# Patient Record
Sex: FEMALE | Race: WHITE | NOT HISPANIC OR LATINO | Employment: OTHER | URBAN - METROPOLITAN AREA
[De-identification: names, ages, dates, MRNs, and addresses within clinical notes are randomized per-mention and may not be internally consistent; named-entity substitution may affect disease eponyms.]

---

## 2017-02-11 ENCOUNTER — HOSPITAL ENCOUNTER (OUTPATIENT)
Dept: MAMMOGRAPHY | Facility: HOSPITAL | Age: 77
Discharge: HOME | End: 2017-02-11

## 2017-02-11 DIAGNOSIS — Z12.31 VISIT FOR SCREENING MAMMOGRAM: ICD-10-CM

## 2018-02-06 ENCOUNTER — RECORDS - HEALTHEAST (OUTPATIENT)
Dept: LAB | Facility: CLINIC | Age: 78
End: 2018-02-06

## 2018-02-07 LAB
ALBUMIN SERPL-MCNC: 3.7 G/DL (ref 3.5–5)
ALP SERPL-CCNC: 99 U/L (ref 45–120)
ALT SERPL W P-5'-P-CCNC: 10 U/L (ref 0–45)
ANION GAP SERPL CALCULATED.3IONS-SCNC: 11 MMOL/L (ref 5–18)
AST SERPL W P-5'-P-CCNC: 12 U/L (ref 0–40)
BASOPHILS # BLD AUTO: 0 THOU/UL (ref 0–0.2)
BASOPHILS NFR BLD AUTO: 1 % (ref 0–2)
BILIRUB SERPL-MCNC: 0.4 MG/DL (ref 0–1)
BUN SERPL-MCNC: 30 MG/DL (ref 8–28)
CALCIUM SERPL-MCNC: 9.1 MG/DL (ref 8.5–10.5)
CHLORIDE BLD-SCNC: 106 MMOL/L (ref 98–107)
CHOLEST SERPL-MCNC: 197 MG/DL
CO2 SERPL-SCNC: 26 MMOL/L (ref 22–31)
CREAT SERPL-MCNC: 1.17 MG/DL (ref 0.6–1.1)
EOSINOPHIL # BLD AUTO: 0.1 THOU/UL (ref 0–0.4)
EOSINOPHIL NFR BLD AUTO: 2 % (ref 0–6)
ERYTHROCYTE [DISTWIDTH] IN BLOOD BY AUTOMATED COUNT: 13.2 % (ref 11–14.5)
FASTING STATUS PATIENT QL REPORTED: ABNORMAL
GFR SERPL CREATININE-BSD FRML MDRD: 45 ML/MIN/1.73M2
GLUCOSE BLD-MCNC: 101 MG/DL (ref 70–125)
HCT VFR BLD AUTO: 43.5 % (ref 35–47)
HDLC SERPL-MCNC: 56 MG/DL
HGB BLD-MCNC: 14.3 G/DL (ref 12–16)
LDLC SERPL CALC-MCNC: 103 MG/DL
LYMPHOCYTES # BLD AUTO: 1.9 THOU/UL (ref 0.8–4.4)
LYMPHOCYTES NFR BLD AUTO: 34 % (ref 20–40)
MCH RBC QN AUTO: 30.4 PG (ref 27–34)
MCHC RBC AUTO-ENTMCNC: 32.9 G/DL (ref 32–36)
MCV RBC AUTO: 92 FL (ref 80–100)
MONOCYTES # BLD AUTO: 0.6 THOU/UL (ref 0–0.9)
MONOCYTES NFR BLD AUTO: 11 % (ref 2–10)
NEUTROPHILS # BLD AUTO: 2.9 THOU/UL (ref 2–7.7)
NEUTROPHILS NFR BLD AUTO: 53 % (ref 50–70)
PLATELET # BLD AUTO: 211 THOU/UL (ref 140–440)
PMV BLD AUTO: 11.1 FL (ref 8.5–12.5)
POTASSIUM BLD-SCNC: 3.7 MMOL/L (ref 3.5–5)
PROT SERPL-MCNC: 7.1 G/DL (ref 6–8)
RBC # BLD AUTO: 4.71 MILL/UL (ref 3.8–5.4)
SODIUM SERPL-SCNC: 143 MMOL/L (ref 136–145)
TRIGL SERPL-MCNC: 191 MG/DL
TSH SERPL DL<=0.005 MIU/L-ACNC: 2.51 UIU/ML (ref 0.3–5)
WBC: 5.5 THOU/UL (ref 4–11)

## 2018-02-21 ENCOUNTER — HOSPITAL ENCOUNTER (OUTPATIENT)
Dept: MAMMOGRAPHY | Facility: HOSPITAL | Age: 78
Discharge: HOME | End: 2018-02-21

## 2018-02-21 DIAGNOSIS — Z12.31 VISIT FOR SCREENING MAMMOGRAM: ICD-10-CM

## 2019-02-22 ENCOUNTER — RECORDS - HEALTHEAST (OUTPATIENT)
Dept: ADMINISTRATIVE | Facility: OTHER | Age: 79
End: 2019-02-22

## 2019-02-22 ENCOUNTER — RECORDS - HEALTHEAST (OUTPATIENT)
Dept: LAB | Facility: CLINIC | Age: 79
End: 2019-02-22

## 2019-02-22 LAB
ALBUMIN SERPL-MCNC: 3.9 G/DL (ref 3.5–5)
ALP SERPL-CCNC: 95 U/L (ref 45–120)
ALT SERPL W P-5'-P-CCNC: 25 U/L (ref 0–45)
ANION GAP SERPL CALCULATED.3IONS-SCNC: 10 MMOL/L (ref 5–18)
AST SERPL W P-5'-P-CCNC: 14 U/L (ref 0–40)
BILIRUB SERPL-MCNC: 0.4 MG/DL (ref 0–1)
BUN SERPL-MCNC: 22 MG/DL (ref 8–28)
CALCIUM SERPL-MCNC: 9 MG/DL (ref 8.5–10.5)
CHLORIDE BLD-SCNC: 105 MMOL/L (ref 98–107)
CHOLEST SERPL-MCNC: 200 MG/DL
CO2 SERPL-SCNC: 27 MMOL/L (ref 22–31)
CREAT SERPL-MCNC: 1.12 MG/DL (ref 0.6–1.1)
ERYTHROCYTE [DISTWIDTH] IN BLOOD BY AUTOMATED COUNT: 13.2 % (ref 11–14.5)
FASTING STATUS PATIENT QL REPORTED: ABNORMAL
GFR SERPL CREATININE-BSD FRML MDRD: 47 ML/MIN/1.73M2
GLUCOSE BLD-MCNC: 92 MG/DL (ref 70–125)
HCT VFR BLD AUTO: 44.9 % (ref 35–47)
HDLC SERPL-MCNC: 60 MG/DL
HGB BLD-MCNC: 14.5 G/DL (ref 12–16)
LDLC SERPL CALC-MCNC: 95 MG/DL
MCH RBC QN AUTO: 29.5 PG (ref 27–34)
MCHC RBC AUTO-ENTMCNC: 32.3 G/DL (ref 32–36)
MCV RBC AUTO: 91 FL (ref 80–100)
PLATELET # BLD AUTO: 252 THOU/UL (ref 140–440)
PMV BLD AUTO: 10.5 FL (ref 8.5–12.5)
POTASSIUM BLD-SCNC: 3.7 MMOL/L (ref 3.5–5)
PROT SERPL-MCNC: 7.2 G/DL (ref 6–8)
RBC # BLD AUTO: 4.91 MILL/UL (ref 3.8–5.4)
SODIUM SERPL-SCNC: 142 MMOL/L (ref 136–145)
TRIGL SERPL-MCNC: 225 MG/DL
WBC: 6.3 THOU/UL (ref 4–11)

## 2019-04-27 ENCOUNTER — HOSPITAL ENCOUNTER (OUTPATIENT)
Dept: MAMMOGRAPHY | Facility: CLINIC | Age: 79
Discharge: HOME | End: 2019-04-27

## 2019-04-27 DIAGNOSIS — Z12.31 VISIT FOR SCREENING MAMMOGRAM: ICD-10-CM

## 2019-06-03 ENCOUNTER — TRANSFERRED RECORDS (OUTPATIENT)
Dept: HEALTH INFORMATION MANAGEMENT | Facility: CLINIC | Age: 79
End: 2019-06-03

## 2019-06-03 ENCOUNTER — SURGERY - HEALTHEAST (OUTPATIENT)
Dept: SURGERY | Facility: CLINIC | Age: 79
End: 2019-06-03

## 2019-06-03 ENCOUNTER — ANESTHESIA - HEALTHEAST (OUTPATIENT)
Dept: SURGERY | Facility: CLINIC | Age: 79
End: 2019-06-03

## 2019-06-03 ASSESSMENT — MIFFLIN-ST. JEOR: SCORE: 1099.55

## 2019-06-04 ENCOUNTER — TRANSFERRED RECORDS (OUTPATIENT)
Dept: HEALTH INFORMATION MANAGEMENT | Facility: CLINIC | Age: 79
End: 2019-06-04

## 2019-06-04 ASSESSMENT — MIFFLIN-ST. JEOR: SCORE: 1103.18

## 2019-06-06 ENCOUNTER — HOME CARE/HOSPICE - HEALTHEAST (OUTPATIENT)
Dept: HOME HEALTH SERVICES | Facility: HOME HEALTH | Age: 79
End: 2019-06-06

## 2019-06-07 ENCOUNTER — COMMUNICATION - HEALTHEAST (OUTPATIENT)
Dept: NEUROSURGERY | Facility: CLINIC | Age: 79
End: 2019-06-07

## 2019-06-07 ENCOUNTER — COMMUNICATION - HEALTHEAST (OUTPATIENT)
Dept: PULMONOLOGY | Facility: OTHER | Age: 79
End: 2019-06-07

## 2019-06-07 DIAGNOSIS — I67.1 NONRUPTURED CEREBRAL ANEURYSM: ICD-10-CM

## 2019-06-09 ENCOUNTER — HOME CARE/HOSPICE - HEALTHEAST (OUTPATIENT)
Dept: HOME HEALTH SERVICES | Facility: HOME HEALTH | Age: 79
End: 2019-06-09

## 2019-06-10 ENCOUNTER — HOME CARE/HOSPICE - HEALTHEAST (OUTPATIENT)
Dept: HOME HEALTH SERVICES | Facility: HOME HEALTH | Age: 79
End: 2019-06-10

## 2019-06-11 ENCOUNTER — COMMUNICATION - HEALTHEAST (OUTPATIENT)
Dept: HOME HEALTH SERVICES | Facility: HOME HEALTH | Age: 79
End: 2019-06-11

## 2019-06-11 ENCOUNTER — HOME CARE/HOSPICE - HEALTHEAST (OUTPATIENT)
Dept: HOME HEALTH SERVICES | Facility: HOME HEALTH | Age: 79
End: 2019-06-11

## 2019-06-12 ENCOUNTER — HOME CARE/HOSPICE - HEALTHEAST (OUTPATIENT)
Dept: HOME HEALTH SERVICES | Facility: HOME HEALTH | Age: 79
End: 2019-06-12

## 2019-06-13 ENCOUNTER — OFFICE VISIT - HEALTHEAST (OUTPATIENT)
Dept: PULMONOLOGY | Facility: OTHER | Age: 79
End: 2019-06-13

## 2019-06-13 DIAGNOSIS — R91.8 LUNG MASS: ICD-10-CM

## 2019-06-13 DIAGNOSIS — R91.8 LUNG NODULE, MULTIPLE: ICD-10-CM

## 2019-06-13 ASSESSMENT — MIFFLIN-ST. JEOR: SCORE: 1050.1

## 2019-06-14 ENCOUNTER — HOME CARE/HOSPICE - HEALTHEAST (OUTPATIENT)
Dept: HOME HEALTH SERVICES | Facility: HOME HEALTH | Age: 79
End: 2019-06-14

## 2019-06-14 ENCOUNTER — COMMUNICATION - HEALTHEAST (OUTPATIENT)
Dept: HOME HEALTH SERVICES | Facility: HOME HEALTH | Age: 79
End: 2019-06-14

## 2019-06-17 ENCOUNTER — HOME CARE/HOSPICE - HEALTHEAST (OUTPATIENT)
Dept: HOME HEALTH SERVICES | Facility: HOME HEALTH | Age: 79
End: 2019-06-17

## 2019-06-19 ENCOUNTER — COMMUNICATION - HEALTHEAST (OUTPATIENT)
Dept: TELEHEALTH | Facility: CLINIC | Age: 79
End: 2019-06-19

## 2019-06-19 ENCOUNTER — HOSPITAL ENCOUNTER (OUTPATIENT)
Dept: PET IMAGING | Facility: HOSPITAL | Age: 79
Discharge: HOME | End: 2019-06-19
Attending: INTERNAL MEDICINE

## 2019-06-19 ENCOUNTER — HOME CARE/HOSPICE - HEALTHEAST (OUTPATIENT)
Dept: HOME HEALTH SERVICES | Facility: HOME HEALTH | Age: 79
End: 2019-06-19

## 2019-06-19 DIAGNOSIS — R91.8 LUNG MASS: ICD-10-CM

## 2019-06-19 LAB — GLUCOSE BLDC GLUCOMTR-MCNC: 108 MG/DL (ref 70–139)

## 2019-06-20 ENCOUNTER — HOME CARE/HOSPICE - HEALTHEAST (OUTPATIENT)
Dept: HOME HEALTH SERVICES | Facility: HOME HEALTH | Age: 79
End: 2019-06-20

## 2019-06-21 ENCOUNTER — AMBULATORY - HEALTHEAST (OUTPATIENT)
Dept: PULMONOLOGY | Facility: OTHER | Age: 79
End: 2019-06-21

## 2019-06-21 ENCOUNTER — HOME CARE/HOSPICE - HEALTHEAST (OUTPATIENT)
Dept: HOME HEALTH SERVICES | Facility: HOME HEALTH | Age: 79
End: 2019-06-21

## 2019-06-21 DIAGNOSIS — R91.8 LUNG MASS: ICD-10-CM

## 2019-06-24 ENCOUNTER — AMBULATORY - HEALTHEAST (OUTPATIENT)
Dept: ONCOLOGY | Facility: CLINIC | Age: 79
End: 2019-06-24

## 2019-06-24 ENCOUNTER — HOME CARE/HOSPICE - HEALTHEAST (OUTPATIENT)
Dept: HOME HEALTH SERVICES | Facility: HOME HEALTH | Age: 79
End: 2019-06-24

## 2019-06-24 ENCOUNTER — COMMUNICATION - HEALTHEAST (OUTPATIENT)
Dept: ONCOLOGY | Facility: CLINIC | Age: 79
End: 2019-06-24

## 2019-06-24 ENCOUNTER — AMBULATORY - HEALTHEAST (OUTPATIENT)
Dept: PULMONOLOGY | Facility: OTHER | Age: 79
End: 2019-06-24

## 2019-06-24 DIAGNOSIS — R91.8 PULMONARY MASS: ICD-10-CM

## 2019-06-26 ENCOUNTER — HOME CARE/HOSPICE - HEALTHEAST (OUTPATIENT)
Dept: HOME HEALTH SERVICES | Facility: HOME HEALTH | Age: 79
End: 2019-06-26

## 2019-06-26 ENCOUNTER — COMMUNICATION - HEALTHEAST (OUTPATIENT)
Dept: ONCOLOGY | Facility: CLINIC | Age: 79
End: 2019-06-26

## 2019-06-27 ENCOUNTER — HOME CARE/HOSPICE - HEALTHEAST (OUTPATIENT)
Dept: HOME HEALTH SERVICES | Facility: HOME HEALTH | Age: 79
End: 2019-06-27

## 2019-06-28 ENCOUNTER — HOME CARE/HOSPICE - HEALTHEAST (OUTPATIENT)
Dept: HOME HEALTH SERVICES | Facility: HOME HEALTH | Age: 79
End: 2019-06-28

## 2019-07-08 ENCOUNTER — HOSPITAL ENCOUNTER (OUTPATIENT)
Dept: RADIOLOGY | Facility: CLINIC | Age: 79
Discharge: HOME | End: 2019-07-08
Attending: RADIOLOGY

## 2019-07-08 ENCOUNTER — HOSPITAL ENCOUNTER (OUTPATIENT)
Dept: CT IMAGING | Facility: CLINIC | Age: 79
Discharge: HOME | End: 2019-07-08
Attending: INTERNAL MEDICINE | Admitting: RADIOLOGY

## 2019-07-08 ENCOUNTER — HOSPITAL ENCOUNTER (OUTPATIENT)
Dept: CT IMAGING | Facility: CLINIC | Age: 79
Discharge: HOME | End: 2019-07-08
Attending: INTERNAL MEDICINE

## 2019-07-08 DIAGNOSIS — R91.8 LUNG MASS: ICD-10-CM

## 2019-07-08 DIAGNOSIS — R91.8 PULMONARY MASS: ICD-10-CM

## 2019-07-08 LAB
HGB BLD-MCNC: 12.4 G/DL (ref 12–16)
INR PPP: 0.96 (ref 0.9–1.1)
PLATELET # BLD AUTO: 178 THOU/UL (ref 140–440)

## 2019-07-08 ASSESSMENT — MIFFLIN-ST. JEOR: SCORE: 1059.63

## 2019-07-09 LAB
CAP COMMENT: ABNORMAL
LAB AP CHARGES (HE HISTORICAL CONVERSION): ABNORMAL
LAB AP INITIAL CYTO EVAL (HE HISTORICAL CONVERSION): ABNORMAL
LAB MED GENERAL PATH INTERP (HE HISTORICAL CONVERSION): ABNORMAL
PATH REPORT.COMMENTS IMP SPEC: ABNORMAL
PATH REPORT.COMMENTS IMP SPEC: ABNORMAL
PATH REPORT.FINAL DX SPEC: ABNORMAL
PATH REPORT.MICROSCOPIC SPEC OTHER STN: ABNORMAL
PATH REPORT.RELEVANT HX SPEC: ABNORMAL
RESULT FLAG (HE HISTORICAL CONVERSION): ABNORMAL
SPECIMEN DESCRIPTION: ABNORMAL

## 2019-07-11 ENCOUNTER — AMBULATORY - HEALTHEAST (OUTPATIENT)
Dept: PULMONOLOGY | Facility: OTHER | Age: 79
End: 2019-07-11

## 2019-07-11 DIAGNOSIS — R91.8 LUNG MASS: ICD-10-CM

## 2019-07-12 ENCOUNTER — TRANSFERRED RECORDS (OUTPATIENT)
Dept: HEALTH INFORMATION MANAGEMENT | Facility: CLINIC | Age: 79
End: 2019-07-12

## 2019-07-12 ENCOUNTER — OFFICE VISIT - HEALTHEAST (OUTPATIENT)
Dept: ONCOLOGY | Facility: HOSPITAL | Age: 79
End: 2019-07-12

## 2019-07-12 DIAGNOSIS — R91.8 PULMONARY MASS: ICD-10-CM

## 2019-07-12 DIAGNOSIS — R91.8 LUNG MASS: ICD-10-CM

## 2019-07-12 ASSESSMENT — MIFFLIN-ST. JEOR: SCORE: 1043.76

## 2019-07-17 ENCOUNTER — RECORDS - HEALTHEAST (OUTPATIENT)
Dept: LAB | Facility: CLINIC | Age: 79
End: 2019-07-17

## 2019-07-17 LAB
ANION GAP SERPL CALCULATED.3IONS-SCNC: 11 MMOL/L (ref 5–18)
BUN SERPL-MCNC: 15 MG/DL (ref 8–28)
CALCIUM SERPL-MCNC: 8.8 MG/DL (ref 8.5–10.5)
CHLORIDE BLD-SCNC: 107 MMOL/L (ref 98–107)
CO2 SERPL-SCNC: 26 MMOL/L (ref 22–31)
CREAT SERPL-MCNC: 0.91 MG/DL (ref 0.6–1.1)
GFR SERPL CREATININE-BSD FRML MDRD: 59 ML/MIN/1.73M2
GLUCOSE BLD-MCNC: 103 MG/DL (ref 70–125)
POTASSIUM BLD-SCNC: 3 MMOL/L (ref 3.5–5)
SODIUM SERPL-SCNC: 144 MMOL/L (ref 136–145)

## 2019-07-18 ENCOUNTER — AMBULATORY - HEALTHEAST (OUTPATIENT)
Dept: ONCOLOGY | Facility: HOSPITAL | Age: 79
End: 2019-07-18

## 2019-07-18 ENCOUNTER — COMMUNICATION - HEALTHEAST (OUTPATIENT)
Dept: ONCOLOGY | Facility: HOSPITAL | Age: 79
End: 2019-07-18

## 2019-07-22 ENCOUNTER — TRANSFERRED RECORDS (OUTPATIENT)
Dept: HEALTH INFORMATION MANAGEMENT | Facility: CLINIC | Age: 79
End: 2019-07-22

## 2019-07-22 ENCOUNTER — HOSPITAL ENCOUNTER (OUTPATIENT)
Dept: RESPIRATORY THERAPY | Facility: HOSPITAL | Age: 79
Discharge: HOME | End: 2019-07-22
Attending: INTERNAL MEDICINE

## 2019-07-22 LAB
BASE EXCESS BLDA CALC-SCNC: 1.9 MMOL/L
COHGB MFR BLD: 97.2 % (ref 95–96)
HCO3, ARTERIAL CALC - HISTORICAL: 26.3 MMOL/L (ref 23–29)
O2/TOTAL GAS SETTING VFR VENT: ABNORMAL %
OXYHEMOGLOBIN - HISTORICAL: 94.9 % (ref 95–96)
PCO2 BLD: 36 MM HG (ref 35–45)
PH BLD: 7.46 [PH] (ref 7.37–7.44)
PO2 BLD: 74 MM HG (ref 75–85)
TEMPERATURE: 37 DEGREES C
VENTILATION MODE: ABNORMAL

## 2019-07-29 ENCOUNTER — HOSPITAL ENCOUNTER (OUTPATIENT)
Dept: CT IMAGING | Facility: CLINIC | Age: 79
Discharge: HOME | End: 2019-07-29
Attending: INTERNAL MEDICINE

## 2019-07-29 ENCOUNTER — COMMUNICATION - HEALTHEAST (OUTPATIENT)
Dept: ONCOLOGY | Facility: CLINIC | Age: 79
End: 2019-07-29

## 2019-08-01 ENCOUNTER — OFFICE VISIT - HEALTHEAST (OUTPATIENT)
Dept: PULMONOLOGY | Facility: OTHER | Age: 79
End: 2019-08-01

## 2019-08-01 ENCOUNTER — TRANSFERRED RECORDS (OUTPATIENT)
Dept: HEALTH INFORMATION MANAGEMENT | Facility: CLINIC | Age: 79
End: 2019-08-01

## 2019-08-01 DIAGNOSIS — C34.12 MALIGNANT NEOPLASM OF UPPER LOBE OF LEFT LUNG (H): ICD-10-CM

## 2019-08-01 DIAGNOSIS — R91.1 PULMONARY NODULE: ICD-10-CM

## 2019-08-01 DIAGNOSIS — I63.9 CEREBROVASCULAR ACCIDENT (CVA), UNSPECIFIED MECHANISM (H): ICD-10-CM

## 2019-08-01 ASSESSMENT — MIFFLIN-ST. JEOR: SCORE: 1037.86

## 2019-08-02 ENCOUNTER — COMMUNICATION - HEALTHEAST (OUTPATIENT)
Dept: ONCOLOGY | Facility: CLINIC | Age: 79
End: 2019-08-02

## 2019-08-03 ENCOUNTER — SURGERY - HEALTHEAST (OUTPATIENT)
Dept: CARDIOLOGY | Facility: CLINIC | Age: 79
End: 2019-08-03

## 2019-08-08 ENCOUNTER — TRANSFERRED RECORDS (OUTPATIENT)
Dept: HEALTH INFORMATION MANAGEMENT | Facility: CLINIC | Age: 79
End: 2019-08-08

## 2019-08-08 ENCOUNTER — SURGERY - HEALTHEAST (OUTPATIENT)
Dept: SURGERY | Facility: CLINIC | Age: 79
End: 2019-08-08

## 2019-08-08 ENCOUNTER — ANESTHESIA - HEALTHEAST (OUTPATIENT)
Dept: SURGERY | Facility: CLINIC | Age: 79
End: 2019-08-08

## 2019-08-08 ASSESSMENT — MIFFLIN-ST. JEOR: SCORE: 1036.95

## 2019-08-15 ENCOUNTER — OFFICE VISIT - HEALTHEAST (OUTPATIENT)
Dept: PULMONOLOGY | Facility: OTHER | Age: 79
End: 2019-08-15

## 2019-08-15 ENCOUNTER — TRANSFERRED RECORDS (OUTPATIENT)
Dept: HEALTH INFORMATION MANAGEMENT | Facility: CLINIC | Age: 79
End: 2019-08-15

## 2019-08-15 DIAGNOSIS — C34.12 MALIGNANT NEOPLASM OF UPPER LOBE OF LEFT LUNG (H): ICD-10-CM

## 2019-08-15 DIAGNOSIS — R91.8 PULMONARY NODULES: ICD-10-CM

## 2019-08-15 ASSESSMENT — MIFFLIN-ST. JEOR: SCORE: 1036.95

## 2019-08-16 ENCOUNTER — DOCUMENTATION ONLY (OUTPATIENT)
Dept: SURGERY | Facility: CLINIC | Age: 79
End: 2019-08-16

## 2019-08-16 DIAGNOSIS — C34.12 MALIGNANT NEOPLASM OF UPPER LOBE OF LEFT LUNG (H): Primary | ICD-10-CM

## 2019-08-16 RX ORDER — CEFAZOLIN SODIUM 2 G/50ML
2 SOLUTION INTRAVENOUS
Status: CANCELLED | OUTPATIENT
Start: 2019-08-16

## 2019-08-16 RX ORDER — CEFAZOLIN SODIUM 1 G/50ML
1 INJECTION, SOLUTION INTRAVENOUS SEE ADMIN INSTRUCTIONS
Status: CANCELLED | OUTPATIENT
Start: 2019-08-16

## 2019-08-16 NOTE — PROGRESS NOTES
From IB:    Hey,     This is a pt that is getting surgery on 8/30 with Dr. Rosado. She is a [facility] pt and Kirsten had said the nurse at [facility] was going to send records?     Thank you for your help,   Milena       Was not able to find patient in Care Everywhere. Faxed request to [facility], IB sent to Milena.  7:06 AM

## 2019-08-16 NOTE — PROGRESS NOTES
Spoke w/ [facility] film room, they will push all chest related images. Of note, there is a PET 6/19/19 & a CT 6/9/19 uploaded to PACS currently. All image reports will be faxed over shortly.   9:30 AM

## 2019-08-19 ENCOUNTER — TELEPHONE (OUTPATIENT)
Dept: SURGERY | Facility: CLINIC | Age: 79
End: 2019-08-19

## 2019-08-19 NOTE — TELEPHONE ENCOUNTER
Spoke with daughter Mary Kay to schedule procedure with Dr. Bernard Rosado    Procedure was scheduled on 08/30 at [facility] OR  Patient will have H&P with PAC  Patient is aware a / is needed day of surgery.   Surgery letter was sent via [employer], patient has my direct contact information for any further questions.

## 2019-08-20 ENCOUNTER — PRE VISIT (OUTPATIENT)
Dept: SURGERY | Facility: CLINIC | Age: 79
End: 2019-08-20

## 2019-08-20 NOTE — TELEPHONE ENCOUNTER
FUTURE VISIT INFORMATION      SURGERY INFORMATION:    Date: 19    Location: [facility] OR    Surgeon:  Bernard Rosado    Anesthesia Type:  Combined General with Block, General    RECORDS REQUESTED FROM:       Primary Care Provider: Kirsty Rainey PA-C    Pertinent Medical History: Hypertension, lung nodules    Most recent EKG+ Tracin19- [facility]- requested tracing    Most recent ECHO: 19- [facility]    Most recent PFT's: 19- [facility]

## 2019-08-22 ENCOUNTER — ANESTHESIA EVENT (OUTPATIENT)
Dept: SURGERY | Facility: CLINIC | Age: 79
DRG: 164 | End: 2019-08-22
Payer: COMMERCIAL

## 2019-08-23 ENCOUNTER — OFFICE VISIT (OUTPATIENT)
Dept: SURGERY | Facility: CLINIC | Age: 79
End: 2019-08-23
Payer: COMMERCIAL

## 2019-08-23 ENCOUNTER — ANCILLARY PROCEDURE (OUTPATIENT)
Dept: CT IMAGING | Facility: CLINIC | Age: 79
End: 2019-08-23
Attending: CLINICAL NURSE SPECIALIST
Payer: COMMERCIAL

## 2019-08-23 VITALS
WEIGHT: 147 LBS | HEART RATE: 53 BPM | RESPIRATION RATE: 16 BRPM | SYSTOLIC BLOOD PRESSURE: 149 MMHG | DIASTOLIC BLOOD PRESSURE: 88 MMHG | HEIGHT: 59 IN | BODY MASS INDEX: 29.64 KG/M2 | OXYGEN SATURATION: 99 %

## 2019-08-23 DIAGNOSIS — C34.12 MALIGNANT NEOPLASM OF UPPER LOBE OF LEFT LUNG (H): ICD-10-CM

## 2019-08-23 DIAGNOSIS — Z01.818 PRE-OP EXAMINATION: Primary | ICD-10-CM

## 2019-08-23 LAB — NT-PROBNP SERPL-MCNC: 468 PG/ML (ref 0–450)

## 2019-08-23 RX ORDER — AMLODIPINE BESYLATE 5 MG/1
TABLET ORAL
Refills: 1 | Status: ON HOLD | COMMUNITY
Start: 2019-06-24 | End: 2019-09-03

## 2019-08-23 RX ORDER — FUROSEMIDE 20 MG
20 TABLET ORAL EVERY MORNING
COMMUNITY
Start: 2019-05-07

## 2019-08-23 RX ORDER — ONDANSETRON 2 MG/ML
4 INJECTION INTRAMUSCULAR; INTRAVENOUS EVERY 30 MIN PRN
Status: CANCELLED | OUTPATIENT
Start: 2019-08-23

## 2019-08-23 RX ORDER — ATORVASTATIN CALCIUM 40 MG/1
TABLET, FILM COATED ORAL
Refills: 1 | COMMUNITY
Start: 2019-07-07

## 2019-08-23 RX ORDER — SODIUM CHLORIDE, SODIUM LACTATE, POTASSIUM CHLORIDE, CALCIUM CHLORIDE 600; 310; 30; 20 MG/100ML; MG/100ML; MG/100ML; MG/100ML
INJECTION, SOLUTION INTRAVENOUS CONTINUOUS
Status: CANCELLED | OUTPATIENT
Start: 2019-08-23

## 2019-08-23 RX ORDER — FLUTICASONE PROPIONATE 50 MCG
1 SPRAY, SUSPENSION (ML) NASAL EVERY MORNING
COMMUNITY
Start: 2019-04-15

## 2019-08-23 RX ORDER — ONDANSETRON 4 MG/1
4 TABLET, ORALLY DISINTEGRATING ORAL EVERY 30 MIN PRN
Status: CANCELLED | OUTPATIENT
Start: 2019-08-23

## 2019-08-23 RX ORDER — ALBUTEROL SULFATE 0.83 MG/ML
2.5 SOLUTION RESPIRATORY (INHALATION) ONCE
Status: CANCELLED | OUTPATIENT
Start: 2019-08-23 | End: 2019-08-23

## 2019-08-23 RX ORDER — ATENOLOL 100 MG/1
TABLET ORAL
Refills: 2 | COMMUNITY
Start: 2019-06-13 | End: 2021-01-01

## 2019-08-23 RX ORDER — ACETAMINOPHEN 500 MG
1000 TABLET ORAL PRN
COMMUNITY

## 2019-08-23 RX ORDER — NALOXONE HYDROCHLORIDE 0.4 MG/ML
.1-.4 INJECTION, SOLUTION INTRAMUSCULAR; INTRAVENOUS; SUBCUTANEOUS
Status: CANCELLED | OUTPATIENT
Start: 2019-08-23 | End: 2019-08-24

## 2019-08-23 RX ORDER — FENTANYL CITRATE 50 UG/ML
25-50 INJECTION, SOLUTION INTRAMUSCULAR; INTRAVENOUS
Status: CANCELLED | OUTPATIENT
Start: 2019-08-23

## 2019-08-23 RX ORDER — CLOPIDOGREL BISULFATE 75 MG/1
75 TABLET ORAL EVERY MORNING
Refills: 1 | COMMUNITY
Start: 2019-07-07

## 2019-08-23 ASSESSMENT — LIFESTYLE VARIABLES: TOBACCO_USE: 1

## 2019-08-23 ASSESSMENT — MIFFLIN-ST. JEOR: SCORE: 1033.61

## 2019-08-23 ASSESSMENT — PAIN SCALES - GENERAL: PAINLEVEL: NO PAIN (0)

## 2019-08-23 ASSESSMENT — ENCOUNTER SYMPTOMS: SEIZURES: 0

## 2019-08-23 NOTE — H&P
Pre-Operative H & P     CC:  Preoperative exam to assess for increased cardiopulmonary risk while undergoing surgery and anesthesia.    Date of Encounter: 8/23/2019  Primary Care Physician:  No primary care provider on file.     Reason for visit: pre operative examination, Malignant neoplasm of upper lobe of left lung    HPI  Gabriela Watkins is a 79 year old female who presents for pre-operative H & P in preparation for left video assisted thoracoscopic superior trisegmentectomy, possible thoracotomy with Dr. Dillon on 8/30/19 at [facility].     The patient is an 79 year old woman who had stomach pain in May 2019. She then presented to the hospital in early June and was found to have a ruptured gallbladder. She underwent a cholecystectomy on 6/3/19. She had an ischemic stroke post operatively and was also found to have an intracranial aneurysm and bilateral lung nodules. The patient has a 50 year pack history. She denies productive cough, wheezing or SOB. 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 She followed up with Dr. Rosado on 8/15/19 and they discussed her treatment options. She is now scheduled for the procedure as above.     History is obtained from the patient and chart review    Past Medical History  Past Medical History:   Diagnosis Date     CVA (cerebral vascular accident) (H)      HTN (hypertension)      Lung nodule      Malignant neoplasm of upper lobe of left lung (H)        Past Surgical History  Past Surgical History:   Procedure Laterality Date     BLADDER SURGERY  1970     CATARACT IOL, RT/LT       CHOLECYSTECTOMY  06/03/2019     HYSTERECTOMY  1970     MEDIASTINOSCOPY  08/08/2019    lymph node biopsy     OOPHORECTOMY  1970       Hx of Blood transfusions/reactions: none     Hx of abnormal bleeding or anti-platelet use: Plavix - hold 7 days    Menstrual history: No LMP  recorded. Patient has had a hysterectomy.:     Steroid use in the last year: none    Personal or FH with difficulty with Anesthesia:  denies    Prior to Admission Medications  Current Outpatient Medications   Medication Sig Dispense Refill     acetaminophen (TYLENOL) 500 MG tablet Take 1,000 mg by mouth as needed        amLODIPine (NORVASC) 5 MG tablet TAKE 1 TABLET BY MOUTH ONCE A DAY FOR BLOOD PRESSURE. PT TAKING EVERY MORNING  1     atenolol (TENORMIN) 100 MG tablet TAKE 1 TABLET BY MOUTH EVERY DAY FOR BLOOD PRESSURE. PT TAKING EVERY MORNING  2     atorvastatin (LIPITOR) 40 MG tablet TAKE 1 TABLET BY MOUTH EVERYDAY AT BEDTIME  1     clopidogrel (PLAVIX) 75 MG tablet Take 75 mg by mouth every morning   1     fluticasone (FLONASE) 50 MCG/ACT nasal spray Spray 1 spray in nostril every morning        furosemide (LASIX) 20 MG tablet Take 20 mg by mouth every morning        Nutritional Supplements (ENSURE COMPLETE PO) Take 1 Can by mouth as needed       UNABLE TO FIND Apply 1 g topically as needed MEDICATION NAME: THERAWORKS         Allergies  Allergies   Allergen Reactions     Rosuvastatin      Simvastatin        Social History  Social History     Socioeconomic History     Marital status:      Spouse name: Not on file     Number of children: Not on file     Years of education: Not on file     Highest education level: Not on file   Occupational History     Not on file   Social Needs     Financial resource strain: Not on file     Food insecurity:     Worry: Not on file     Inability: Not on file     Transportation needs:     Medical: Not on file     Non-medical: Not on file   Tobacco Use     Smoking status: Former Smoker     Types: Cigarettes     Start date:      Last attempt to quit: 2001     Years since quittin.0     Smokeless tobacco: Never Used   Substance and Sexual Activity     Alcohol use: Not Currently     Comment: nothing since 2019     Drug use: Never     Sexual activity: Not on file    Lifestyle     Physical activity:     Days per week: Not on file     Minutes per session: Not on file     Stress: Not on file   Relationships     Social connections:     Talks on phone: Not on file     Gets together: Not on file     Attends Hoahaoism service: Not on file     Active member of club or organization: Not on file     Attends meetings of clubs or organizations: Not on file     Relationship status: Not on file     Intimate partner violence:     Fear of current or ex partner: Not on file     Emotionally abused: Not on file     Physically abused: Not on file     Forced sexual activity: Not on file   Other Topics Concern     Not on file   Social History Narrative     Not on file       Family History  Family History   Problem Relation Age of Onset     Other - See Comments Father          from stroke during gallbladder surgery        Review of Systems  ROS/MED HX    ENT/Pulmonary:     (+)tobacco use (50 year pack history ), Past use , . Other pulmonary disease lung nodules.    Neurologic: Comment: Intracranial aneurysm    (+)CVA date: 6/3/19 with deficits   (-) seizuresTIA: memory.   Cardiovascular:     (+) hypertension----. : . . . :. . Previous cardiac testing Echodate:19results:date: results:ECG reviewed date:19 results:NSR date: results:          METS/Exercise Tolerance:  3 - Able to walk 1-2 blocks without stopping   Hematologic:  - neg hematologic  ROS      (-) history of blood clots, anemia and History of Transfusion   Musculoskeletal:  - neg musculoskeletal ROS       GI/Hepatic:     (+) Other GI/Hepatic s/p cholecystectomy 6/3/19     (-) GERD   Renal/Genitourinary:  - ROS Renal section negative       Endo:  - neg endo ROS       Psychiatric:  - neg psychiatric ROS       Infectious Disease:  - neg infectious disease ROS       Malignancy:   (+) Malignancy History of Lung  Lung CA Active status post.         Other:    (+) no H/O Chronic Pain,no other significant disability          The complete  "review of systems is negative other than noted in the HPI or here.       BP: (!) 149/88 Pulse: 53   Resp: 16 SpO2: 99 %         147 lbs 0 oz  4' 10.76\"   Body mass index is 29.93 kg/m .       Physical Exam  Constitutional: Awake, alert, cooperative, no apparent distress, and appears stated age.  Eyes: Pupils equal, round and reactive to light, extra ocular muscles intact, sclera clear, conjunctiva normal.  HENT: Normocephalic, oral pharynx with moist mucus membranes, good dentition. No goiter appreciated.   Respiratory: Clear to auscultation bilaterally, no crackles or wheezing.  Cardiovascular: Regular rate and rhythm, normal S1 and S2, and no murmur noted.  Carotids +2, no bruits. No edema. Palpable pulses to radial  DP and PT arteries.   GI: Normal bowel sounds, soft, non-distended, non-tender, no masses palpated, no hepatosplenomegaly.  Surgical scars: well healed  Lymph/Hematologic: No cervical lymphadenopathy and no supraclavicular lymphadenopathy.  Genitourinary:  defer  Skin: Warm and dry.  No rashes at anticipated surgical site.   Musculoskeletal: Limited ROM of neck. There is no redness, warmth, or swelling of the joints. Gross motor strength is normal.    Neurologic: Awake, alert, oriented to name, place and time. Cranial nerves II-XII are grossly intact. Gait is normal.   Neuropsychiatric: Calm, cooperative. Normal affect.     Labs: (personally reviewed)  8/8/19  Component Value Ref Range Performed At Pathologist Signature   WBC 7.4 4.0 - 11.0 thou/uL [facility]     RBC 4.40 3.80 - 5.40 mill/uL [facility]     Hemoglobin 13.2 12.0 - 16.0 g/dL [facility]     Hematocrit 40.5 35.0 - 47.0 % [facility]     MCV 92 80 - 100 fL [facility]     MCH 30.0 27.0 - 34.0 pg [facility]     MCHC 32.6 32.0 - 36.0 g/dL [facility]     RDW 14.4 11.0 - 14.5 % [facility]     Platelets 190 140 - 440 thou/uL [facility]     MPV 10.0 8.5 - 12.5 fL [facility]       Sodium " 142 136 - 145 mmol/L [facility]     Potassium 4.6 3.5 - 5.0 mmol/L . [facility]     Chloride 108 (H) 98 - 107 mmol/L . [facility]     CO2 26 22 - 31 mmol/L . [facility]     Anion Gap, Calculation 8 5 - 18 mmol/L [facility]     Glucose 102 70 - 125 mg/dL . [facility]     Calcium 10.1 8.5 - 10.5 mg/dL [facility]     BUN 24 8 - 28 mg/dL [facility]     Creatinine 0.98 0.60 - 1.10 mg/dL [facility]     GFR MDRD Af Amer >60 >60 mL/min/1.73m2 [facility]     GFR MDRD Non Af Amer 54 (L) >60 mL/min/1.73m2 [facility]     Results for VERA SCOTT (MRN [id number]) as of 2019 15:28   Ref. Range 2019 14:48   N-Terminal Pro Bnp Latest Ref Range: 0 - 450 pg/mL 468 (H)   Will continue to plan for post op troponins.     EK19  Normal sinus rhythm  Nonspecific T wave abnormality    Echo 19    Normal left ventricular size.The calculated left ventricular ejection   fraction is 60%. This represents a normal ejection fraction. Mild   hypertrophy noted. Left ventricular diastolic dysfunction consistent with   pseudonormalization. No thrombus. No mass.    Left atrial volume is mildly increased.    Normal right ventricular size and systolic function.    No hemodynamically significant valvular heart abnormalities.    No previous study for comparison.    CTA 19  CONCLUSION:  HEAD CT:  1.  Evolving areas of recent infarct in the left parasagittal parietal lobe and along the left parieto-occipital junction as identified on recent brain MRI.  2.  No new infarct. No hemorrhage.    HEAD CTA:  1.  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  2.  Intracranial atherosclerosis without proximal large vessel occlusion.    NECK CTA:  1.  50% stenosis proximal left internal carotid artery by NASCET criteria. Less than 50% narrowing distal left common carotid artery.  2.  No hemodynamically significant narrowing in the right carotid artery by NASCET  criteria.  3.  Patent vertebral arteries. No dissection.  4.  A 3.5 x 2 cm parenchymal density in the anterior upper left lung suspicious for malignancy. Recommend further evaluation with chest CT.  PFTs: 7/23/19  Impression:  This pulmonary function study is abnormal.  Spirometry is   suggestive of a restrictive ventilatory defect.  In the setting of normal   lung volume measurements, this is likely due to body habitus.    Bronchodilator response was present.     The patient's records and results personally reviewed by this provider.     Outside records reviewed from: care everywhere     ASSESSMENT and PLAN  Gabriela is a 79 year old woman who is scheduled for left video assisted thoracoscopic superior trisegmentectomy on 8/30/19 by Dr. Rosado in treatment of malignant neoplasm of upper lobe of left lung.  PAC referral for risk assessment and optimization for anesthesia with comorbid conditions of HTN, lung nodules, former smoker, recent CVA, intracranial aneurysm, left hip pain:    Pre-operative considerations:  1.  Cardiac:  Functional status- METS 3, patient can walk 1 block and go up 3-4 stairs into the house.  High risk surgery with 6.6% (RCRI #) risk of major adverse cardiac event.  She had previous cardiac testing in 6/4/19 and 6/5/19. No further cardiac testing indicated.   ~ HTN - continue atenolol and norvasc. Hold lasix DOS.     2.  Pulm:  Airway feasible.  SHAVON risk: Low  ~ Right upper lob nodule, left upper lobe adenocarcinoma - s/p mediastinoscopy/lymph node biopsy on 8/8/19. Procedure as above.   ~ Former smoker, 50 year pack history. PFTs on 7/23/19 show FEV1 1.10L, FEV1/FVC 79%.  Will order nebulizer for DOS.   ~ Patient taking flonase for her ears. Can continue DOS.     3. Neuro: Post cholecystectomy CVA on 6/3/19. The patient had vision changes and slurring of speech. She was also found to have non symptomatic intracranial aneurysm. Neurology was consulted during that admission and they  recommended repeat imaging in 1 year. She was started on Plavix and lipitor. She held her Plavix for 7 days prior to her 8/8/19 mediastinoscopy and lymph node biopsy. She reports she had no issues with that. Her daughter reports they saw her neurologist last Friday who knew about the upcoming surgery and the plan was for her to follow up in 6 months. Recommend she hold plavix for 7 days prior.     4. GI:  Risk of PONV score = 3.  If > 2, anti-emetic intervention recommended.    VTE risk: 3%    Patient was discussed with Dr Dillon.    The patient is optimized for their procedure. AVS with information on surgery time/arrival time, meds and NPO status given by nursing staff.        Sherie Roblero PA-C  Preoperative Assessment Center  [facility]  Clinic and Surgery Center  Phone: [phone]  Fax: [phone]

## 2019-08-23 NOTE — PROGRESS NOTES
Slides rec'd from [facility], taken to 5th floor lab - [facility]  8:17 AM

## 2019-08-23 NOTE — PATIENT INSTRUCTIONS
Preparing for Your Surgery      Name:  Gabriela Watkins   MRN:  [id number]   :  1940   Today's Date:  2019     Arriving for surgery:  Surgery date:  19  Arrival time:  5AM  Please come to:       [facility] [address]  [address] [place]  [address]    - ? parking is available in front of the hospital      -    Please proceed to [address] on the 3rd floor. [phone]?     - ?If you are in need of directions, wheelchair or escort please stop at the Information Desk in the lobby.  Inform the information person that you are here for surgery; a wheelchair and escort to [address] will be provided.?     What can I eat or drink?  -  You may have solid food or milk products until 8 hours prior to your surgery. 19, 11:30PM  -  You may have Gatorade, Clear Ensure, water, apple juice or 7up/Sprite until 2 hours prior to your surgery. 19, 5:30AM    Which medicines can I take?  Stop Aspirin, Multivitamins and supplements one week prior to surgery.  Hold Ibuprofen for 24 hours and/or Naproxen for 48 hours prior to surgery.   Hold Clopidogrel(Plavix) for 7 days prior to surgery.   -  Do NOT take these medications in the morning, the day of surgery:    Furosemide(Lasix)    -  Please take these medications the day of surgery:    Amlodipine(Norvasc)   Atenolol    Flonase    Acetaminophen as needed    How do I prepare myself?  -  Take two showers: one the night before surgery; and one the morning of surgery.         Use Scrubcare or Hibiclens to wash from neck down, leave soap on your skin for up to one minute.  Do not get soap in your eyes or ears.  You may use your own shampoo and conditioner; no other hair products.   -  Do NOT use lotion, powder, deodorant, or antiperspirant the day of your surgery.  -  Do NOT wear any makeup, fingernail polish or jewelry.  -  Begin using Incentive Spirometer 1 week prior to surgery.  Use 4 times per day, up    to 5 breaths  each time.  Bring Incentive Spirometer to hospital.  - Do not bring your own medications to the hospital, except for inhalers and eye   drops.  -  Bring your ID and insurance card.    Questions or Concerns:  -If you are scheduled on the East or West campus and have questions or concerns regarding the day of surgery, please call Preadmission Nursing at [phone].     -For questions after surgery please call your surgeons office.           Enhanced Recovery After Surgery     This is a team effort, including you, to get you back on your feet, eating and drinking normally and out of the hospital as quickly as possible.  The goals are: 1) NO INFECTIONS and   2) RETURN TO NORMAL DIET    How can we achieve these goals?  1) STAY ACTIVE: Walk every day before your surgery; try to increase the amount every day.  Walk after surgery as much as you can-the nurses will help you.  Walking speeds healing and gets you home quicker, you heal better at home and have less risk of infection.     2) INCENTIVE SPIROMETER: Practice your incentive spirometer 4 times per day with 5 repetitions each time.  Using the incentive spirometer can strengthen your muscles between your ribs and help you have a strong cough after surgery.  A more effective cough can help prevent problems with your lungs.    3) STAY HYDRATED: Drink clear liquids up until 2 hours before your surgery. We would like you to purchase a drink such as Gatorade or Ensure Clear (not the milkshake type).  Drink this before bedtime and on the way into the hospital, drink between 8-10 ounces or until you feel hydrated.  Keeping well hydrated leads to your veins being plump, you wake up faster, and you are less likely to be nauseated. Start drinking water as soon as you can after surgery and advance to clear liquids and food as tolerated.  IV fluids contain salt, drinking fluids will minimize the amount of IV fluids you need and decrease the amount of salt you get.    The most  common reason for the patient to be readmitted is dehydration. Staying hydrated after you go home from the hospital is very important.  Ensure or Ensure Clear are good options to keep you hydrated.     4) PAIN MANAGEMENT: If we minimize the amount of opioids and narcotics, and use regional blocks (which numb the area where your surgery is) along with oral pain medications; you will have less side effects of nausea and constipation. Narcotics can slow down your bowels and cause you to stay in the hospital longer.     Our goal is to keep you comfortable; eating and drinking normally and back home safely.     Using an Incentive Spirometer    An incentive spirometer is a device that helps you do deep breathing exercises. These exercises expand your lungs, aid in circulation, and help prevent pneumonia. Deep breathing exercises also help you breathe better and improve the function of your lungs by:    Keeping your lungs clear    Strengthening your breathing muscles    Helping prevent respiratory complications or problems  The incentive spirometer gives you a way to take an active part in your care. A nurse or therapist will teach you breathing exercises. To do these exercises, you will breathe in through your mouth and not your nose. The incentive spirometer only works correctly if you breathe in through your mouth.    Steps to clear lungs  Step 1. Exhale normally. Then, inhale normally.    Relax and breathe out.  Step 2. Place your lips tightly around the mouthpiece.    Make sure the device is upright and not tilted.  Step 3. Inhale as much air as you can through the mouthpiece (don't breath through your nose).    Inhale slowly and deeply.    Hold your breath long enough to keep the balls or disk raised for at least 3 to 5 seconds, or as instructed by your healthcare provider.  Step 4. Repeat the exercise regularly.  Begin using the Incentive Spirometer one week prior to your surgery, 4 times per day-5 times each.

## 2019-08-23 NOTE — ANESTHESIA PREPROCEDURE EVALUATION
Anesthesia Pre-Procedure Evaluation    Patient: Gabriela Watkins   MRN:     [id number] Gender:   female   Age:    79 year old :      1940        Preoperative Diagnosis: Malignant neoplasm of upper lobe of left lung   Procedure(s):  Left Video Assisted Thoracoscopic Superior Trisegmentectomy  Possible Thoracotomy     Past Medical History:   Diagnosis Date     CVA (cerebral vascular accident) (H)      HTN (hypertension)      Lung nodule      Malignant neoplasm of upper lobe of left lung (H)       Past Surgical History:   Procedure Laterality Date     BLADDER SURGERY       CATARACT IOL, RT/LT       CHOLECYSTECTOMY  2019     HYSTERECTOMY  1970     MEDIASTINOSCOPY  2019    lymph node biopsy     OOPHORECTOMY            Anesthesia Evaluation     . Pt has had prior anesthetic. Type: General    No history of anesthetic complications          ROS/MED HX    ENT/Pulmonary:     (+)tobacco use (50 year pack history ), Past use , . Other pulmonary disease lung nodules.    Neurologic: Comment: Intracranial aneurysm    (+)CVA date: 6/3/19 with deficits- memory,    (-) seizures and TIA   Cardiovascular:     (+) hypertension----. : . . . :. . Previous cardiac testing Echodate:19results:date: results:ECG reviewed date:19 results:NSR date: results:          METS/Exercise Tolerance:  3 - Able to walk 1-2 blocks without stopping   Hematologic:  - neg hematologic  ROS      (-) history of blood clots, anemia and History of Transfusion   Musculoskeletal:  - neg musculoskeletal ROS       GI/Hepatic:     (+) Other GI/Hepatic s/p cholecystectomy 6/3/19     (-) GERD   Renal/Genitourinary:  - ROS Renal section negative       Endo:  - neg endo ROS       Psychiatric:  - neg psychiatric ROS       Infectious Disease:  - neg infectious disease ROS       Malignancy:   (+) Malignancy History of Lung  Lung CA Active status post.         Other:    (+) no H/O Chronic Pain,no other significant disability                         PHYSICAL EXAM:   Mental Status/Neuro: A/A/O; Age Appropriate   Airway: Facies: Feasible  Mallampati: III  Mouth/Opening: Full  TM distance: > 6 cm  Neck ROM: Limited   Respiratory: Auscultation: CTAB     Resp. Rate: Normal     Resp. Effort: Normal      CV: Rhythm: Regular  Rate: George  Heart: Normal Sounds  Edema: None  Pulses: Normal   Comments:                      8/8/19  Component Value Ref Range Performed At Pathologist Signature   WBC 7.4 4.0 - 11.0 thou/uL [facility]     RBC 4.40 3.80 - 5.40 mill/uL [facility]     Hemoglobin 13.2 12.0 - 16.0 g/dL [facility]     Hematocrit 40.5 35.0 - 47.0 % [facility]     MCV 92 80 - 100 fL [facility]     MCH 30.0 27.0 - 34.0 pg [facility]     MCHC 32.6 32.0 - 36.0 g/dL [facility]     RDW 14.4 11.0 - 14.5 % [facility]     Platelets 190 140 - 440 thou/uL [facility]     MPV 10.0 8.5 - 12.5 fL [facility]       Sodium 142 136 - 145 mmol/L [facility]     Potassium 4.6 3.5 - 5.0 mmol/L [facility]     Chloride 108 (H) 98 - 107 mmol/L [facility]     CO2 26 22 - 31 mmol/L [facility]     Anion Gap, Calculation 8 5 - 18 mmol/L [facility]     Glucose 102 70 - 125 mg/dL [facility]     Calcium 10.1 8.5 - 10.5 mg/dL [facility]     BUN 24 8 - 28 mg/dL [facility]     Creatinine 0.98 0.60 - 1.10 mg/dL [facility]     GFR MDRD Af Amer >60 >60 mL/min/1.73m2 [facility]     GFR MDRD Non Af Amer 54 (L) >60 mL/min/1.73m2 [facility]        Ref. Range 8/23/2019 14:48   N-Terminal Pro Bnp Latest Ref Range: 0 - 450 pg/mL 468 (H)   Will continue to plan for post op troponins.     Preop Vitals    BP Readings from Last 3 Encounters:   No data found for BP    Pulse Readings from Last 3 Encounters:   No data found for Pulse      Resp Readings from Last 3 Encounters:   No data found for Resp    SpO2 Readings from Last 3 Encounters:   No data found for SpO2      Temp Readings from Last  1 Encounters:   No data found for Temp    Ht Readings from Last 1 Encounters:   No data found for Ht      Wt Readings from Last 1 Encounters:   No data found for Wt    There is no height or weight on file to calculate BMI.     LDA:        Assessment:   ASA SCORE: 3    H&P: History and physical reviewed and following examination; no interval change.         Plan:   Anes. Type:  General   Pre-Medication: None   Induction:  IV (Standard)   Airway: ETT; Oral   Access/Monitoring: PIV   Maintenance: Balanced     Postop Plan:   Postop Pain: Opioids  Postop Sedation/Airway: Not planned  Disposition: Inpatient/Admit     PONV Management:   Adult Risk Factors: Female, Postop Opioids   Prevention: Ondansetron, Dexamethasone     CONSENT: Direct conversation   Plan and risks discussed with: Patient   Blood Products: Consented (ALL Blood Products)                PAC Discussion and Assessment    ASA Classification: 3  Case is suitable for: [place]  Anesthetic techniques and relevant risks discussed: GA with regional block for post-op pain control  Invasive monitoring and risk discussed:   Types:   Possibility and Risk of blood transfusion discussed:   NPO instructions given:   Additional anesthetic preparation and risks discussed:   Needs early admission to pre-op area:   Other:     PAC Resident/NP Anesthesia Assessment:  Gabriela is a 79 year old woman who is scheduled for left video assisted thoracoscopic superior trisegmentectomy on 8/30/19 by Dr. Rosado in treatment of malignant neoplasm of upper lobe of left lung.  PAC referral for risk assessment and optimization for anesthesia with comorbid conditions of HTN, lung nodules, former smoker, recent CVA, intracranial aneurysm, left hip pain:    Pre-operative considerations:  1.  Cardiac:  Functional status- METS 3, patient can walk 1 block and go up 3-4 stairs into the house.  High risk surgery with 6.6% (RCRI #) risk of major adverse cardiac event.  She had previous cardiac  testing in 6/4/19 and 6/5/19. No further cardiac testing indicated.   ~ HTN - continue atenolol and norvasc. Hold lasix DOS.     2.  Pulm:  Airway feasible.  SHAVON risk: Low  ~ Right upper lob nodule, left upper lobe adenocarcinoma - s/p mediastinoscopy/lymph node biopsy on 8/8/19. Procedure as above.   ~ Former smoker, 50 year pack history. PFTs on 7/23/19 show FEV1 1.10L, FEV1/FVC 79%.  Will order nebulizer for DOS.   ~ Patient taking flonase for her ears. Can continue DOS.     3. Neuro: Post cholecystectomy CVA on 6/3/19. The patient had vision changes and slurring of speech. She was also found to have non symptomatic intracranial aneurysm. Neurology was consulted during that admission and they recommended repeat imaging in 1 year. She was started on Plavix and lipitor. She held her Plavix for 7 days prior to her 8/8/19 mediastinoscopy and lymph node biopsy. She reports she had no issues with that. Her daughter reports they saw her neurologist last Friday who knew about the upcoming surgery and the plan was for her to follow up in 6 months. Recommend she hold plavix for 7 days prior.     4. GI:  Risk of PONV score = 3.  If > 2, anti-emetic intervention recommended.    VTE risk: 3%    Patient is optimized and is acceptable candidate for the proposed procedure.  No further diagnostic evaluation is needed. She will complete labs today. Given the patient's recent stroke after discussion with Dr. Dillon will plan to check troponins post operatively and will check a BNP today.     Patient also evaluated by Dr. Dillon. See recommendations below.     For further details of assessment, testing, and physical exam please see H and P completed on same date.    Sherie Roblero PA-C        Mid-Level Provider/Resident: Sherie Roblero PA-C  Date: 8/23/19  Time:     Attending Anesthesiologist Anesthesia Assessment:  I have examined the patient and reviewed the medical record.  I have discussed the patient with the BAIRON and concur  with her assessment  The patient is scheduled for VATS for resection of left upper lobe neoplasm  The patient had TEMLA 2 weeks ago at [facility] with no difficulty  The patient had emergency cholecystectomy 6/2019 at [facility] that was complicated by an ischemic CVA  (her residual is now mainly memory loss).  Her neurologist is aware of the upcoming surgery and the time sensitive nature and has approved proceeding.  Th patient has no known cardiac disease and denies cardiac symptoms but her activity level is less than 4 METs.  She had an echocardiogram 6/2019 as part of CVA workup that did not demonstrate regional WMA.  EF was 60%    PE:  Pleasant frail obese female in NAD. TEMLA scar visible on neck.  MPC 2, five cm TMD. Limited neck mobility.  Lungs clear.  CV  RRR without murmur    Patient has 14 fold elevated risk fo MACE with GA after CVA. But surgery is time sensitive.    We have ordered pre and post op troponins and BNP.  Consider post op monitoring in telemetry  No further testing necessary per protocol.       Reviewed and Signed by PAC Anesthesiologist  Anesthesiologist: Anupam Dillon MD  Date: 8/23/2019  Time:   Pass/Fail:   Disposition:     PAC Pharmacist Assessment:        Pharmacist:   Date:   Time:    Sherie Roblero PA-C

## 2019-08-26 PROCEDURE — 00000346 ZZHCL STATISTIC REVIEW OUTSIDE SLIDES TC 88321: Performed by: SURGERY

## 2019-08-30 ENCOUNTER — APPOINTMENT (OUTPATIENT)
Dept: SPEECH THERAPY | Facility: CLINIC | Age: 79
DRG: 164 | End: 2019-08-30
Attending: THORACIC SURGERY (CARDIOTHORACIC VASCULAR SURGERY)
Payer: COMMERCIAL

## 2019-08-30 ENCOUNTER — ANESTHESIA (OUTPATIENT)
Dept: SURGERY | Facility: CLINIC | Age: 79
DRG: 164 | End: 2019-08-30
Payer: COMMERCIAL

## 2019-08-30 ENCOUNTER — HOSPITAL ENCOUNTER (INPATIENT)
Facility: CLINIC | Age: 79
LOS: 4 days | Discharge: HOME | DRG: 164 | End: 2019-09-03
Attending: THORACIC SURGERY (CARDIOTHORACIC VASCULAR SURGERY) | Admitting: THORACIC SURGERY (CARDIOTHORACIC VASCULAR SURGERY)
Payer: COMMERCIAL

## 2019-08-30 ENCOUNTER — APPOINTMENT (OUTPATIENT)
Dept: GENERAL RADIOLOGY | Facility: CLINIC | Age: 79
DRG: 164 | End: 2019-08-30
Attending: THORACIC SURGERY (CARDIOTHORACIC VASCULAR SURGERY)
Payer: COMMERCIAL

## 2019-08-30 DIAGNOSIS — C34.90 MALIGNANT NEOPLASM OF LUNG, UNSPECIFIED LATERALITY, UNSPECIFIED PART OF LUNG (H): Primary | ICD-10-CM

## 2019-08-30 DIAGNOSIS — I48.0 PAROXYSMAL ATRIAL FIBRILLATION (H): ICD-10-CM

## 2019-08-30 LAB
ABO + RH BLD: NORMAL
ABO + RH BLD: NORMAL
ANION GAP SERPL CALCULATED.3IONS-SCNC: 7 MMOL/L (ref 3–14)
BLD GP AB SCN SERPL QL: NORMAL
BLD PROD TYP BPU: NORMAL
BLOOD BANK CMNT PATIENT-IMP: NORMAL
BLOOD BANK CMNT PATIENT-IMP: NORMAL
BUN SERPL-MCNC: 21 MG/DL (ref 7–30)
CALCIUM SERPL-MCNC: 8.4 MG/DL (ref 8.5–10.1)
CHLORIDE SERPL-SCNC: 105 MMOL/L (ref 94–109)
CO2 SERPL-SCNC: 24 MMOL/L (ref 20–32)
COPATH REPORT: NORMAL
CREAT SERPL-MCNC: 0.85 MG/DL (ref 0.52–1.04)
CREAT SERPL-MCNC: 0.98 MG/DL (ref 0.52–1.04)
CREAT SERPL-MCNC: 1.09 MG/DL (ref 0.52–1.04)
GFR SERPL CREATININE-BSD FRML MDRD: 48 ML/MIN/{1.73_M2}
GFR SERPL CREATININE-BSD FRML MDRD: 54 ML/MIN/{1.73_M2}
GFR SERPL CREATININE-BSD FRML MDRD: 65 ML/MIN/{1.73_M2}
GLUCOSE BLDC GLUCOMTR-MCNC: 98 MG/DL (ref 70–99)
GLUCOSE SERPL-MCNC: 124 MG/DL (ref 70–99)
HGB BLD-MCNC: 11.5 G/DL (ref 11.7–15.7)
MAGNESIUM SERPL-MCNC: 1.8 MG/DL (ref 1.6–2.3)
NUM BPU REQUESTED: 2
PLATELET # BLD AUTO: 236 10E9/L (ref 150–450)
POTASSIUM SERPL-SCNC: 3.4 MMOL/L (ref 3.4–5.3)
POTASSIUM SERPL-SCNC: 4.4 MMOL/L (ref 3.4–5.3)
SODIUM SERPL-SCNC: 136 MMOL/L (ref 133–144)
SPECIMEN EXP DATE BLD: NORMAL
TROPONIN I SERPL-MCNC: <0.015 UG/L (ref 0–0.04)

## 2019-08-30 PROCEDURE — 0BBG4ZZ EXCISION OF LEFT UPPER LUNG LOBE, PERCUTANEOUS ENDOSCOPIC APPROACH: ICD-10-PCS | Performed by: THORACIC SURGERY (CARDIOTHORACIC VASCULAR SURGERY)

## 2019-08-30 PROCEDURE — 25000132 ZZH RX MED GY IP 250 OP 250 PS 637: Performed by: SURGERY

## 2019-08-30 PROCEDURE — 25000128 H RX IP 250 OP 636: Performed by: ANESTHESIOLOGY

## 2019-08-30 PROCEDURE — 36415 COLL VENOUS BLD VENIPUNCTURE: CPT | Performed by: SURGERY

## 2019-08-30 PROCEDURE — 12000004 ZZH R&B IMCU UMMC

## 2019-08-30 PROCEDURE — 25000125 ZZHC RX 250: Performed by: NURSE ANESTHETIST, CERTIFIED REGISTERED

## 2019-08-30 PROCEDURE — 25000128 H RX IP 250 OP 636: Performed by: NURSE ANESTHETIST, CERTIFIED REGISTERED

## 2019-08-30 PROCEDURE — 85018 HEMOGLOBIN: CPT | Performed by: ANESTHESIOLOGY

## 2019-08-30 PROCEDURE — 25000566 ZZH SEVOFLURANE, EA 15 MIN: Performed by: THORACIC SURGERY (CARDIOTHORACIC VASCULAR SURGERY)

## 2019-08-30 PROCEDURE — 93010 ELECTROCARDIOGRAM REPORT: CPT | Mod: 76 | Performed by: INTERNAL MEDICINE

## 2019-08-30 PROCEDURE — 00000146 ZZHCL STATISTIC GLUCOSE BY METER IP

## 2019-08-30 PROCEDURE — 83735 ASSAY OF MAGNESIUM: CPT | Performed by: STUDENT IN AN ORGANIZED HEALTH CARE EDUCATION/TRAINING PROGRAM

## 2019-08-30 PROCEDURE — 36415 COLL VENOUS BLD VENIPUNCTURE: CPT | Performed by: ANESTHESIOLOGY

## 2019-08-30 PROCEDURE — 80048 BASIC METABOLIC PNL TOTAL CA: CPT | Performed by: STUDENT IN AN ORGANIZED HEALTH CARE EDUCATION/TRAINING PROGRAM

## 2019-08-30 PROCEDURE — 71000014 ZZH RECOVERY PHASE 1 LEVEL 2 FIRST HR: Performed by: THORACIC SURGERY (CARDIOTHORACIC VASCULAR SURGERY)

## 2019-08-30 PROCEDURE — 81445 SO NEO GSAP 5-50DNA/DNA&RNA: CPT | Performed by: THORACIC SURGERY (CARDIOTHORACIC VASCULAR SURGERY)

## 2019-08-30 PROCEDURE — 82565 ASSAY OF CREATININE: CPT | Performed by: ANESTHESIOLOGY

## 2019-08-30 PROCEDURE — 88341 IMHCHEM/IMCYTCHM EA ADD ANTB: CPT | Performed by: THORACIC SURGERY (CARDIOTHORACIC VASCULAR SURGERY)

## 2019-08-30 PROCEDURE — 27210794 ZZH OR GENERAL SUPPLY STERILE: Performed by: THORACIC SURGERY (CARDIOTHORACIC VASCULAR SURGERY)

## 2019-08-30 PROCEDURE — 25000125 ZZHC RX 250: Performed by: ANESTHESIOLOGY

## 2019-08-30 PROCEDURE — 0BJ08ZZ INSPECTION OF TRACHEOBRONCHIAL TREE, VIA NATURAL OR ARTIFICIAL OPENING ENDOSCOPIC: ICD-10-PCS | Performed by: THORACIC SURGERY (CARDIOTHORACIC VASCULAR SURGERY)

## 2019-08-30 PROCEDURE — 40000014 ZZH STATISTIC ARTERIAL MONITORING DAILY

## 2019-08-30 PROCEDURE — 86900 BLOOD TYPING SEROLOGIC ABO: CPT | Performed by: THORACIC SURGERY (CARDIOTHORACIC VASCULAR SURGERY)

## 2019-08-30 PROCEDURE — 25800030 ZZH RX IP 258 OP 636: Performed by: NURSE ANESTHETIST, CERTIFIED REGISTERED

## 2019-08-30 PROCEDURE — 25000128 H RX IP 250 OP 636: Performed by: SURGERY

## 2019-08-30 PROCEDURE — 84484 ASSAY OF TROPONIN QUANT: CPT | Performed by: ANESTHESIOLOGY

## 2019-08-30 PROCEDURE — 25000125 ZZHC RX 250: Performed by: PHYSICIAN ASSISTANT

## 2019-08-30 PROCEDURE — 84132 ASSAY OF SERUM POTASSIUM: CPT | Performed by: ANESTHESIOLOGY

## 2019-08-30 PROCEDURE — 92610 EVALUATE SWALLOWING FUNCTION: CPT | Mod: GN

## 2019-08-30 PROCEDURE — 40000171 ZZH STATISTIC PRE-PROCEDURE ASSESSMENT III: Performed by: THORACIC SURGERY (CARDIOTHORACIC VASCULAR SURGERY)

## 2019-08-30 PROCEDURE — 25000128 H RX IP 250 OP 636: Performed by: STUDENT IN AN ORGANIZED HEALTH CARE EDUCATION/TRAINING PROGRAM

## 2019-08-30 PROCEDURE — 71045 X-RAY EXAM CHEST 1 VIEW: CPT

## 2019-08-30 PROCEDURE — 88305 TISSUE EXAM BY PATHOLOGIST: CPT | Performed by: THORACIC SURGERY (CARDIOTHORACIC VASCULAR SURGERY)

## 2019-08-30 PROCEDURE — 93005 ELECTROCARDIOGRAM TRACING: CPT

## 2019-08-30 PROCEDURE — 85049 AUTOMATED PLATELET COUNT: CPT | Performed by: SURGERY

## 2019-08-30 PROCEDURE — 25000128 H RX IP 250 OP 636: Performed by: THORACIC SURGERY (CARDIOTHORACIC VASCULAR SURGERY)

## 2019-08-30 PROCEDURE — 37000009 ZZH ANESTHESIA TECHNICAL FEE, EACH ADDTL 15 MIN: Performed by: THORACIC SURGERY (CARDIOTHORACIC VASCULAR SURGERY)

## 2019-08-30 PROCEDURE — 88309 TISSUE EXAM BY PATHOLOGIST: CPT | Performed by: THORACIC SURGERY (CARDIOTHORACIC VASCULAR SURGERY)

## 2019-08-30 PROCEDURE — 88342 IMHCHEM/IMCYTCHM 1ST ANTB: CPT | Performed by: THORACIC SURGERY (CARDIOTHORACIC VASCULAR SURGERY)

## 2019-08-30 PROCEDURE — 36000064 ZZH SURGERY LEVEL 4 EA 15 ADDTL MIN - UMMC: Performed by: THORACIC SURGERY (CARDIOTHORACIC VASCULAR SURGERY)

## 2019-08-30 PROCEDURE — 27110038 ZZH RX 271: Performed by: ANESTHESIOLOGY

## 2019-08-30 PROCEDURE — 07B74ZZ EXCISION OF THORAX LYMPHATIC, PERCUTANEOUS ENDOSCOPIC APPROACH: ICD-10-PCS | Performed by: THORACIC SURGERY (CARDIOTHORACIC VASCULAR SURGERY)

## 2019-08-30 PROCEDURE — 36415 COLL VENOUS BLD VENIPUNCTURE: CPT | Performed by: STUDENT IN AN ORGANIZED HEALTH CARE EDUCATION/TRAINING PROGRAM

## 2019-08-30 PROCEDURE — 40000275 ZZH STATISTIC RCP TIME EA 10 MIN

## 2019-08-30 PROCEDURE — 82565 ASSAY OF CREATININE: CPT | Performed by: SURGERY

## 2019-08-30 PROCEDURE — 37000008 ZZH ANESTHESIA TECHNICAL FEE, 1ST 30 MIN: Performed by: THORACIC SURGERY (CARDIOTHORACIC VASCULAR SURGERY)

## 2019-08-30 PROCEDURE — 25000125 ZZHC RX 250: Performed by: STUDENT IN AN ORGANIZED HEALTH CARE EDUCATION/TRAINING PROGRAM

## 2019-08-30 PROCEDURE — 71000015 ZZH RECOVERY PHASE 1 LEVEL 2 EA ADDTL HR: Performed by: THORACIC SURGERY (CARDIOTHORACIC VASCULAR SURGERY)

## 2019-08-30 PROCEDURE — 88360 TUMOR IMMUNOHISTOCHEM/MANUAL: CPT | Performed by: THORACIC SURGERY (CARDIOTHORACIC VASCULAR SURGERY)

## 2019-08-30 PROCEDURE — 00000159 ZZHCL STATISTIC H-SEND OUTS PREP: Performed by: THORACIC SURGERY (CARDIOTHORACIC VASCULAR SURGERY)

## 2019-08-30 PROCEDURE — 36000062 ZZH SURGERY LEVEL 4 1ST 30 MIN - UMMC: Performed by: THORACIC SURGERY (CARDIOTHORACIC VASCULAR SURGERY)

## 2019-08-30 RX ORDER — ONDANSETRON 2 MG/ML
INJECTION INTRAMUSCULAR; INTRAVENOUS PRN
Status: DISCONTINUED | OUTPATIENT
Start: 2019-08-30 | End: 2019-08-30

## 2019-08-30 RX ORDER — HEPARIN SODIUM 5000 [USP'U]/.5ML
5000 INJECTION, SOLUTION INTRAVENOUS; SUBCUTANEOUS ONCE
Status: COMPLETED | OUTPATIENT
Start: 2019-08-30 | End: 2019-08-30

## 2019-08-30 RX ORDER — ONDANSETRON 4 MG/1
4 TABLET, ORALLY DISINTEGRATING ORAL EVERY 30 MIN PRN
Status: DISCONTINUED | OUTPATIENT
Start: 2019-08-30 | End: 2019-08-30 | Stop reason: HOSPADM

## 2019-08-30 RX ORDER — FENTANYL CITRATE 50 UG/ML
25-50 INJECTION, SOLUTION INTRAMUSCULAR; INTRAVENOUS
Status: DISCONTINUED | OUTPATIENT
Start: 2019-08-30 | End: 2019-08-30 | Stop reason: HOSPADM

## 2019-08-30 RX ORDER — CEFAZOLIN SODIUM 1 G/50ML
2 SOLUTION INTRAVENOUS
Status: COMPLETED | OUTPATIENT
Start: 2019-08-30 | End: 2019-08-30

## 2019-08-30 RX ORDER — BUPIVACAINE HYDROCHLORIDE 2.5 MG/ML
INJECTION, SOLUTION EPIDURAL; INFILTRATION; INTRACAUDAL PRN
Status: DISCONTINUED | OUTPATIENT
Start: 2019-08-30 | End: 2019-08-30 | Stop reason: HOSPADM

## 2019-08-30 RX ORDER — AMOXICILLIN 250 MG
1 CAPSULE ORAL 2 TIMES DAILY
Status: DISCONTINUED | OUTPATIENT
Start: 2019-08-30 | End: 2019-09-03 | Stop reason: HOSPADM

## 2019-08-30 RX ORDER — ACETAMINOPHEN 325 MG/1
975 TABLET ORAL ONCE
Status: DISCONTINUED | OUTPATIENT
Start: 2019-08-30 | End: 2019-08-30 | Stop reason: HOSPADM

## 2019-08-30 RX ORDER — GABAPENTIN 300 MG/1
300 CAPSULE ORAL ONCE
Status: DISCONTINUED | OUTPATIENT
Start: 2019-08-30 | End: 2019-08-30 | Stop reason: HOSPADM

## 2019-08-30 RX ORDER — PROPOFOL 10 MG/ML
INJECTION, EMULSION INTRAVENOUS PRN
Status: DISCONTINUED | OUTPATIENT
Start: 2019-08-30 | End: 2019-08-30

## 2019-08-30 RX ORDER — POTASSIUM CHLORIDE 1.5 G/1.58G
20-40 POWDER, FOR SOLUTION ORAL
Status: DISCONTINUED | OUTPATIENT
Start: 2019-08-30 | End: 2019-09-03 | Stop reason: HOSPADM

## 2019-08-30 RX ORDER — POTASSIUM CHLORIDE 29.8 MG/ML
20 INJECTION INTRAVENOUS
Status: DISCONTINUED | OUTPATIENT
Start: 2019-08-30 | End: 2019-09-03 | Stop reason: HOSPADM

## 2019-08-30 RX ORDER — POTASSIUM CHLORIDE 750 MG/1
20-40 TABLET, EXTENDED RELEASE ORAL
Status: DISCONTINUED | OUTPATIENT
Start: 2019-08-30 | End: 2019-09-03 | Stop reason: HOSPADM

## 2019-08-30 RX ORDER — NALOXONE HYDROCHLORIDE 0.4 MG/ML
.1-.4 INJECTION, SOLUTION INTRAMUSCULAR; INTRAVENOUS; SUBCUTANEOUS
Status: DISCONTINUED | OUTPATIENT
Start: 2019-08-30 | End: 2019-08-30 | Stop reason: HOSPADM

## 2019-08-30 RX ORDER — MAGNESIUM SULFATE HEPTAHYDRATE 40 MG/ML
4 INJECTION, SOLUTION INTRAVENOUS EVERY 4 HOURS PRN
Status: DISCONTINUED | OUTPATIENT
Start: 2019-08-30 | End: 2019-09-03 | Stop reason: HOSPADM

## 2019-08-30 RX ORDER — NALOXONE HYDROCHLORIDE 0.4 MG/ML
.1-.4 INJECTION, SOLUTION INTRAMUSCULAR; INTRAVENOUS; SUBCUTANEOUS
Status: ACTIVE | OUTPATIENT
Start: 2019-08-30 | End: 2019-08-31

## 2019-08-30 RX ORDER — AMLODIPINE BESYLATE 5 MG/1
5 TABLET ORAL DAILY
Status: DISCONTINUED | OUTPATIENT
Start: 2019-08-31 | End: 2019-09-02

## 2019-08-30 RX ORDER — FENTANYL CITRATE 50 UG/ML
INJECTION, SOLUTION INTRAMUSCULAR; INTRAVENOUS PRN
Status: DISCONTINUED | OUTPATIENT
Start: 2019-08-30 | End: 2019-08-30

## 2019-08-30 RX ORDER — ONDANSETRON 4 MG/1
4 TABLET, ORALLY DISINTEGRATING ORAL EVERY 6 HOURS PRN
Status: DISCONTINUED | OUTPATIENT
Start: 2019-08-30 | End: 2019-08-30

## 2019-08-30 RX ORDER — LIDOCAINE 4 G/G
1 PATCH TOPICAL
Status: DISCONTINUED | OUTPATIENT
Start: 2019-08-31 | End: 2019-09-03 | Stop reason: HOSPADM

## 2019-08-30 RX ORDER — ACETAMINOPHEN 325 MG/1
975 TABLET ORAL EVERY 8 HOURS SCHEDULED
Status: DISPENSED | OUTPATIENT
Start: 2019-08-30 | End: 2019-09-02

## 2019-08-30 RX ORDER — METOPROLOL TARTRATE 1 MG/ML
1-2 INJECTION, SOLUTION INTRAVENOUS EVERY 5 MIN PRN
Status: DISCONTINUED | OUTPATIENT
Start: 2019-08-30 | End: 2019-08-30 | Stop reason: HOSPADM

## 2019-08-30 RX ORDER — ATENOLOL 100 MG/1
100 TABLET ORAL
Status: DISCONTINUED | OUTPATIENT
Start: 2019-08-30 | End: 2019-09-02

## 2019-08-30 RX ORDER — GLYCOPYRROLATE 0.2 MG/ML
INJECTION, SOLUTION INTRAMUSCULAR; INTRAVENOUS PRN
Status: DISCONTINUED | OUTPATIENT
Start: 2019-08-30 | End: 2019-08-30

## 2019-08-30 RX ORDER — ONDANSETRON 2 MG/ML
4 INJECTION INTRAMUSCULAR; INTRAVENOUS EVERY 30 MIN PRN
Status: DISCONTINUED | OUTPATIENT
Start: 2019-08-30 | End: 2019-08-30 | Stop reason: HOSPADM

## 2019-08-30 RX ORDER — ONDANSETRON 2 MG/ML
4 INJECTION INTRAMUSCULAR; INTRAVENOUS EVERY 6 HOURS PRN
Status: DISCONTINUED | OUTPATIENT
Start: 2019-08-30 | End: 2019-08-30

## 2019-08-30 RX ORDER — KETOROLAC TROMETHAMINE 30 MG/ML
15 INJECTION, SOLUTION INTRAMUSCULAR; INTRAVENOUS
Status: COMPLETED | OUTPATIENT
Start: 2019-08-30 | End: 2019-08-30

## 2019-08-30 RX ORDER — SODIUM CHLORIDE, SODIUM LACTATE, POTASSIUM CHLORIDE, CALCIUM CHLORIDE 600; 310; 30; 20 MG/100ML; MG/100ML; MG/100ML; MG/100ML
INJECTION, SOLUTION INTRAVENOUS CONTINUOUS
Status: DISCONTINUED | OUTPATIENT
Start: 2019-08-30 | End: 2019-08-30 | Stop reason: HOSPADM

## 2019-08-30 RX ORDER — ONDANSETRON 4 MG/1
4 TABLET, ORALLY DISINTEGRATING ORAL
Status: DISCONTINUED | OUTPATIENT
Start: 2019-08-30 | End: 2019-09-03 | Stop reason: HOSPADM

## 2019-08-30 RX ORDER — FLUTICASONE PROPIONATE 50 MCG
1 SPRAY, SUSPENSION (ML) NASAL EVERY MORNING
Status: DISCONTINUED | OUTPATIENT
Start: 2019-08-30 | End: 2019-09-03 | Stop reason: HOSPADM

## 2019-08-30 RX ORDER — CEFAZOLIN SODIUM 1 G/3ML
1 INJECTION, POWDER, FOR SOLUTION INTRAMUSCULAR; INTRAVENOUS SEE ADMIN INSTRUCTIONS
Status: DISCONTINUED | OUTPATIENT
Start: 2019-08-30 | End: 2019-08-30 | Stop reason: HOSPADM

## 2019-08-30 RX ORDER — OXYCODONE HYDROCHLORIDE 5 MG/1
5 TABLET ORAL EVERY 4 HOURS PRN
Status: DISCONTINUED | OUTPATIENT
Start: 2019-08-30 | End: 2019-09-03 | Stop reason: HOSPADM

## 2019-08-30 RX ORDER — LIDOCAINE 40 MG/G
CREAM TOPICAL
Status: DISCONTINUED | OUTPATIENT
Start: 2019-08-30 | End: 2019-09-03 | Stop reason: HOSPADM

## 2019-08-30 RX ORDER — AMOXICILLIN 250 MG
2 CAPSULE ORAL 2 TIMES DAILY
Status: DISCONTINUED | OUTPATIENT
Start: 2019-08-30 | End: 2019-09-03 | Stop reason: HOSPADM

## 2019-08-30 RX ORDER — NALOXONE HYDROCHLORIDE 0.4 MG/ML
.1-.4 INJECTION, SOLUTION INTRAMUSCULAR; INTRAVENOUS; SUBCUTANEOUS
Status: DISCONTINUED | OUTPATIENT
Start: 2019-08-30 | End: 2019-09-03 | Stop reason: HOSPADM

## 2019-08-30 RX ORDER — ATENOLOL 100 MG/1
100 TABLET ORAL DAILY
Status: DISCONTINUED | OUTPATIENT
Start: 2019-08-31 | End: 2019-08-30

## 2019-08-30 RX ORDER — SODIUM CHLORIDE, SODIUM LACTATE, POTASSIUM CHLORIDE, CALCIUM CHLORIDE 600; 310; 30; 20 MG/100ML; MG/100ML; MG/100ML; MG/100ML
INJECTION, SOLUTION INTRAVENOUS CONTINUOUS PRN
Status: DISCONTINUED | OUTPATIENT
Start: 2019-08-30 | End: 2019-08-30

## 2019-08-30 RX ORDER — ONDANSETRON 2 MG/ML
4 INJECTION INTRAMUSCULAR; INTRAVENOUS
Status: DISCONTINUED | OUTPATIENT
Start: 2019-08-30 | End: 2019-09-03 | Stop reason: HOSPADM

## 2019-08-30 RX ORDER — ALBUTEROL SULFATE 0.83 MG/ML
2.5 SOLUTION RESPIRATORY (INHALATION) ONCE
Status: COMPLETED | OUTPATIENT
Start: 2019-08-30 | End: 2019-08-30

## 2019-08-30 RX ORDER — HYDROMORPHONE HYDROCHLORIDE 1 MG/ML
.3-.5 INJECTION, SOLUTION INTRAMUSCULAR; INTRAVENOUS; SUBCUTANEOUS EVERY 5 MIN PRN
Status: DISCONTINUED | OUTPATIENT
Start: 2019-08-30 | End: 2019-08-30 | Stop reason: HOSPADM

## 2019-08-30 RX ORDER — ATORVASTATIN CALCIUM 40 MG/1
40 TABLET, FILM COATED ORAL DAILY
Status: DISCONTINUED | OUTPATIENT
Start: 2019-08-30 | End: 2019-09-03 | Stop reason: HOSPADM

## 2019-08-30 RX ORDER — FUROSEMIDE 20 MG
20 TABLET ORAL EVERY MORNING
Status: DISCONTINUED | OUTPATIENT
Start: 2019-08-30 | End: 2019-09-03 | Stop reason: HOSPADM

## 2019-08-30 RX ORDER — LIDOCAINE 40 MG/G
CREAM TOPICAL
Status: DISCONTINUED | OUTPATIENT
Start: 2019-08-30 | End: 2019-08-30 | Stop reason: HOSPADM

## 2019-08-30 RX ORDER — POTASSIUM CL/LIDO/0.9 % NACL 10MEQ/0.1L
10 INTRAVENOUS SOLUTION, PIGGYBACK (ML) INTRAVENOUS
Status: DISCONTINUED | OUTPATIENT
Start: 2019-08-30 | End: 2019-09-03 | Stop reason: HOSPADM

## 2019-08-30 RX ORDER — POTASSIUM CHLORIDE 7.45 MG/ML
10 INJECTION INTRAVENOUS
Status: DISCONTINUED | OUTPATIENT
Start: 2019-08-30 | End: 2019-09-03 | Stop reason: HOSPADM

## 2019-08-30 RX ADMIN — FENTANYL CITRATE 50 MCG: 50 INJECTION, SOLUTION INTRAMUSCULAR; INTRAVENOUS at 08:45

## 2019-08-30 RX ADMIN — ATORVASTATIN CALCIUM 40 MG: 40 TABLET, FILM COATED ORAL at 19:48

## 2019-08-30 RX ADMIN — ROCURONIUM BROMIDE 20 MG: 10 INJECTION INTRAVENOUS at 08:45

## 2019-08-30 RX ADMIN — KETOROLAC TROMETHAMINE 15 MG: 30 INJECTION, SOLUTION INTRAMUSCULAR at 11:03

## 2019-08-30 RX ADMIN — HYDROMORPHONE HYDROCHLORIDE 0.5 MG: 1 INJECTION, SOLUTION INTRAMUSCULAR; INTRAVENOUS; SUBCUTANEOUS at 10:30

## 2019-08-30 RX ADMIN — Medication 2 G: at 21:39

## 2019-08-30 RX ADMIN — RANITIDINE 150 MG: 150 TABLET ORAL at 19:47

## 2019-08-30 RX ADMIN — ROCURONIUM BROMIDE 50 MG: 10 INJECTION INTRAVENOUS at 07:55

## 2019-08-30 RX ADMIN — SUGAMMADEX 200 MG: 100 INJECTION, SOLUTION INTRAVENOUS at 10:14

## 2019-08-30 RX ADMIN — HEPARIN SODIUM 5000 UNITS: 5000 INJECTION, SOLUTION INTRAVENOUS; SUBCUTANEOUS at 08:40

## 2019-08-30 RX ADMIN — Medication 2 G: at 08:30

## 2019-08-30 RX ADMIN — FENTANYL CITRATE 50 MCG: 50 INJECTION, SOLUTION INTRAMUSCULAR; INTRAVENOUS at 08:50

## 2019-08-30 RX ADMIN — RANITIDINE 150 MG: 150 TABLET ORAL at 14:14

## 2019-08-30 RX ADMIN — FENTANYL CITRATE 50 MCG: 50 INJECTION, SOLUTION INTRAMUSCULAR; INTRAVENOUS at 08:54

## 2019-08-30 RX ADMIN — FENTANYL CITRATE 50 MCG: 50 INJECTION INTRAMUSCULAR; INTRAVENOUS at 07:20

## 2019-08-30 RX ADMIN — Medication: at 19:52

## 2019-08-30 RX ADMIN — Medication 14 ML/HR: at 09:19

## 2019-08-30 RX ADMIN — FENTANYL CITRATE 50 MCG: 50 INJECTION, SOLUTION INTRAMUSCULAR; INTRAVENOUS at 10:29

## 2019-08-30 RX ADMIN — PHENYLEPHRINE HYDROCHLORIDE 100 MCG: 10 INJECTION INTRAVENOUS at 07:54

## 2019-08-30 RX ADMIN — FUROSEMIDE 20 MG: 20 TABLET ORAL at 14:14

## 2019-08-30 RX ADMIN — Medication: at 16:00

## 2019-08-30 RX ADMIN — GLYCOPYRROLATE 0.1 MG: 0.2 INJECTION, SOLUTION INTRAMUSCULAR; INTRAVENOUS at 09:16

## 2019-08-30 RX ADMIN — ACETAMINOPHEN 975 MG: 325 TABLET, FILM COATED ORAL at 23:47

## 2019-08-30 RX ADMIN — FENTANYL CITRATE 100 MCG: 50 INJECTION, SOLUTION INTRAMUSCULAR; INTRAVENOUS at 07:54

## 2019-08-30 RX ADMIN — Medication: at 11:44

## 2019-08-30 RX ADMIN — ACETAMINOPHEN 975 MG: 325 TABLET, FILM COATED ORAL at 14:13

## 2019-08-30 RX ADMIN — SODIUM CHLORIDE, POTASSIUM CHLORIDE, SODIUM LACTATE AND CALCIUM CHLORIDE: 600; 310; 30; 20 INJECTION, SOLUTION INTRAVENOUS at 07:46

## 2019-08-30 RX ADMIN — ALBUTEROL SULFATE 2.5 MG: 2.5 SOLUTION RESPIRATORY (INHALATION) at 06:57

## 2019-08-30 RX ADMIN — PROPOFOL 100 MG: 10 INJECTION, EMULSION INTRAVENOUS at 07:54

## 2019-08-30 RX ADMIN — FENTANYL CITRATE 50 MCG: 50 INJECTION, SOLUTION INTRAMUSCULAR; INTRAVENOUS at 10:23

## 2019-08-30 RX ADMIN — SENNOSIDES AND DOCUSATE SODIUM 2 TABLET: 8.6; 5 TABLET ORAL at 19:47

## 2019-08-30 RX ADMIN — ONDANSETRON 4 MG: 2 INJECTION INTRAMUSCULAR; INTRAVENOUS at 10:04

## 2019-08-30 ASSESSMENT — ACTIVITIES OF DAILY LIVING (ADL)
AMBULATION: 0-->INDEPENDENT
RETIRED_COMMUNICATION: 0-->UNDERSTANDS/COMMUNICATES WITHOUT DIFFICULTY
TRANSFERRING: 0-->INDEPENDENT
FALL_HISTORY_WITHIN_LAST_SIX_MONTHS: YES
WHICH_OF_THE_ABOVE_FUNCTIONAL_RISKS_HAD_A_RECENT_ONSET_OR_CHANGE?: COGNITION
RETIRED_EATING: 0-->INDEPENDENT
ADLS_ACUITY_SCORE: 13
TOILETING: 0-->INDEPENDENT
ADLS_ACUITY_SCORE: 15
BATHING: 0-->INDEPENDENT
SWALLOWING: 0-->SWALLOWS FOODS/LIQUIDS WITHOUT DIFFICULTY
COGNITION: 1 - ATTENTION OR MEMORY DEFICITS
NUMBER_OF_TIMES_PATIENT_HAS_FALLEN_WITHIN_LAST_SIX_MONTHS: 1
DRESS: 0-->INDEPENDENT

## 2019-08-30 ASSESSMENT — PAIN DESCRIPTION - DESCRIPTORS
DESCRIPTORS: ACHING
DESCRIPTORS: ACHING

## 2019-08-30 NOTE — PROVIDER NOTIFICATION
Notified cross cover for long QTc 502  . Pt HR in the 50.'s  Spoke to Dr Liang about the above . Plan to hold zofran and atenolol . Will monitor closely .

## 2019-08-30 NOTE — PLAN OF CARE
Discharge Planner SLP   Patient plan for discharge: Unknown  Current status: Clinical swallow eval completed per MD order. Pt presents with functional oropharyngeal swallow mechanism. Oral mech exam unremarkable. Assessed with thin liquids, puree, and higher texture solids. Oral phase WFL. Pharyngeal phase WFL, no overt s/sx of aspiration. Recommend regular diet/thin liquids. Ensure pt is fully alert and upright for all PO, taking small bites/sips at slow rate. No additional SLP services indicated.  Barriers to return to prior living situation: None from ST perspective  Recommendations for discharge: Defer to PT/OT  Rationale for recommendations: Functional oropharyngeal swallow mechanism       Entered by: Abida Benitez 08/30/2019 3:48 PM

## 2019-08-30 NOTE — PLAN OF CARE
Neuro: A&Ox4.   Cardiac: Sinus bradycardia with HR in 40s-50s; team notified. 12 lead EKG done; showed bradycardia.   Respiratory: Sating mid 90s on 2L NC.  GI/: pt voided 300cc around 1530 but hadn't voided until then since surgery.  Diet/appetite: NPO except ice chips and meds; tolerating ice chips well.  Denies nausea; good appetite.  Activity:  Assist of 1 to commode.  Pain: Denies pain.  Skin: No new deficits noted.  LDA's: Left PIV infusing LR @ 10ml/hr.  Right PIV SL.  Left chest tube to -20 suction.    Plan: Continue with POC. Notify primary team with changes.

## 2019-08-30 NOTE — BRIEF OP NOTE
[facility], [place]    Brief Operative Note    Pre-operative diagnosis: Malignant neoplasm of upper lobe of left lung  Post-operative diagnosis same  Procedure: Procedure(s):  Left Video Assisted Thoracoscopic Superior Trisegmentectomy  Possible Thoracotomy  Surgeon: Surgeon(s) and Role:     * Bernard Rosado MD - Primary     * Beatriz Caba MD - Fellow - Assisting  Anesthesia: Combined General with Block   Estimated blood loss: 350cc  Drains: 24F left pleural drain  Specimens:   ID Type Source Tests Collected by Time Destination   A : 11 L Tissue Lymph Node SURGICAL PATHOLOGY EXAM Bernard Rosado MD 8/30/2019  9:20 AM    B : 12 L Tissue Lymph Node SURGICAL PATHOLOGY EXAM Bernard Rosado MD 8/30/2019  9:27 AM    C : Left Upper Lobe Trisegmentectomy  Tissue Lung, Left Upper Lobe SURGICAL PATHOLOGY EXAM Bernard Rosado MD 8/30/2019  9:52 AM    D : Level 5 Tissue Lymph Node SURGICAL PATHOLOGY EXAM Bernard Rosado MD 8/30/2019  9:54 AM      Findings:   normal anatomy.  Complications: None.  Implants:  * No implants in log *

## 2019-08-30 NOTE — PROVIDER NOTIFICATION
Team notified regarding pt's rhythm change from sinus rhythm to sinus bradycardia.  Pt's heart rate has been in the 40s-50s.  Patient is awake and talking.

## 2019-08-30 NOTE — ANESTHESIA CARE TRANSFER NOTE
Patient: Gabriela Watkins    Procedure(s):  Left Video Assisted Thoracoscopic Superior Trisegmentectomy    Diagnosis: Malignant neoplasm of upper lobe of left lung  Diagnosis Additional Information: No value filed.    Anesthesia Type:   No value filed.     Note:  Airway :Face Mask  Patient transferred to:PACU  Comments: Alert and exchanging well. VSS. Report given to RN. Handoff Report: Identifed the Patient, Identified the Reponsible Provider, Reviewed the pertinent medical history, Discussed the surgical course, Reviewed Intra-OP anesthesia mangement and issues during anesthesia, Set expectations for post-procedure period and Allowed opportunity for questions and acknowledgement of understanding      Vitals: (Last set prior to Anesthesia Care Transfer)    CRNA VITALS  8/30/2019 0954 - 8/30/2019 1034      8/30/2019             NIBP:  133/85    Pulse:  77    NIBP Mean:  95    Ht Rate:  77    SpO2:  99 %                Electronically Signed By: SONI Miller CRNA  August 30, 2019  10:34 AM

## 2019-08-30 NOTE — PROGRESS NOTES
Left Erector Block preformed in pre op. Time out called @ 0720 and procedure complete by 0730. 50 mg of fentanyl administered via IV.    Mepilex dressing placed on sacrum for protection.     Waiting for pt to see all members of family

## 2019-08-30 NOTE — PROGRESS NOTES
SPIRITUAL HEALTH SERVICES  [facility] 3C   PRE-SURGERY VISIT    Had pre-surgery visit with pt. Provided spiritual support, prayer.     Yoseph Taveras M.Div.     Pager [phone]

## 2019-08-30 NOTE — ANESTHESIA POSTPROCEDURE EVALUATION
Anesthesia POST Procedure Evaluation    Patient: Gabriela Watkins   MRN:     [id number] Gender:   female   Age:    79 year old :      1940        Preoperative Diagnosis: Malignant neoplasm of upper lobe of left lung   Procedure(s):  Left Video Assisted Thoracoscopic Superior Trisegmentectomy   Postop Comments: No value filed.       Anesthesia Type:  Not documented  No value filed.    Reportable Event: NO     PAIN: Uncomplicated   Sign Out status: Comfortable, Well controlled pain     PONV: No PONV   Sign Out status:  No Nausea or Vomiting     Neuro/Psych: Uneventful perioperative course   Sign Out Status: Preoperative baseline; Age appropriate mentation     Airway/Resp.: Uneventful perioperative course   Sign Out Status: Non labored breathing, age appropriate RR; Resp. Status within EXPECTED Parameters     CV: Uneventful perioperative course   Sign Out status: Appropriate BP and perfusion indices; Appropriate HR/Rhythm     Disposition:   Sign Out in:  PACU  Recovery Course: Uneventful  Follow-Up: Not required           Last Anesthesia Record Vitals:  CRNA VITALS  2019 0954 - 2019 1054      2019             NIBP:  133/85    Pulse:  77    NIBP Mean:  95    Ht Rate:  77    SpO2:  99 %          Last PACU Vitals:  Vitals Value Taken Time   /63 2019  1:50 PM   Temp 36.4  C (97.6  F) 2019 12:53 PM   Pulse 65 2019  1:50 PM   Resp 12 2019 12:53 PM   SpO2 98 % 2019  1:55 PM   Temp src     NIBP 133/85 2019 10:29 AM   Pulse 77 2019 10:29 AM   SpO2 99 % 2019 10:29 AM   Resp     Temp     Ht Rate 77 2019 10:29 AM   Temp 2     Vitals shown include unvalidated device data.      Electronically Signed By: Damien Arroyo MD, 2019, 1:56 PM

## 2019-08-30 NOTE — PROGRESS NOTES
POST OP CHECK  August 30, 2019        S: Patient seen at the bedside awake, alert, and interactive. Left-sided chest wall pain is well controlled, has used PCA once. No shortness of breath. No lightheadedness, palpitations. Has not gotten out of bed. Tolerating CLD, no nausea.    O:     Vitals  BP (!) 145/56 (BP Location: Right arm)   Pulse 56   Temp 98.4  F (36.9  C) (Oral)   Resp 16   SpO2 100%     GEN: NAD  CV: bradycardic, normal rhythm, Peripheral pulses intact  RESP: Nonlabored breathing on 2L, L CT in place to suction, s/s, no leak. Chest wall appropriately tender to palpation. Incision c/d/i  ABD: soft, non-tender  Ext: wwp. No edema  PSYCH: cooperative     L CT to -20, s/s output, no leak    A/P: Gabriela Watkins is an 79 year old female with history of CVA, HTN POD 0 s/p L VATS superior trisegmentectomy. Patient with prolonged QTc to 520, HR in 50s, asymptomatic.    - tylenol, dil PCA, oxy PRN, on-Q per anesthesia, lidocaine patch  - cleared by speech and swallow, CLD, plan for regular diet in am  - LVX PPX starting in am  - CT to -20 suction  - hold Zofran and PTA atenolol i/s/o prolonged QT and bradycardia  - cards consult for prolonged QTc, bradycardia      Marily Liang MD  Surgery Resident

## 2019-08-30 NOTE — PROGRESS NOTES
08/30/19 1544   General Information   Onset Date 08/30/19   Start of Care Date 08/30/19   Referring Physician Beatriz Caba MD   Patient Profile Review/OT: Additional Occupational Profile Info See Profile for full history and prior level of function   Patient/Family Goals Statement None stated   Swallowing Evaluation Bedside swallow evaluation   Behaviorial Observations WFL (within functional limits)   Mode of current nutrition NPO   Respiratory Status O2 Supply   Type of O2 supply Nasal cannula   Comments SLP: Pt is a 80yo female who is s/p Left Video Assisted Thoracoscopic Superior Trisegmentectomy. Pt with hx of CVA without residual deficits. No hx of dysphagia. Clinical swallow eval completed per MD order.   Clinical Swallow Evaluation   Oral Musculature generally intact   Structural Abnormalities none present   Dentition present and adequate   Mucosal Quality good   Mandibular Strength and Mobility intact   Oral Labial Strength and Mobility WFL   Lingual Strength and Mobility WFL   Buccal Strength and Mobility intact   Laryngeal Function Cough;Throat clear;Swallow;Voicing initiated   Oral Musculature Comments Grossly functional   Swallow Eval   Feeding Assistance no assistance needed   Clinical Swallow Eval: Thin Liquid Texture Trial   Mode of Presentation, Thin Liquids cup;straw;self-fed   Volume of Liquid or Food Presented 4oz water   Oral Phase of Swallow WFL   Pharyngeal Phase of Swallow throat clearing   Diagnostic Statement WFL; throat clear x1 but no other s/sx of aspiration for remainder of trials   Clinical Swallow Eval: Puree Solid Texture Trial   Mode of Presentation, Puree spoon;self-fed   Volume of Puree Presented 2oz pudding   Oral Phase, Puree WFL   Pharyngeal Phase, Puree intact   Diagnostic Statement WFL, no overt s/sx of aspiration   Clinical Swallow Eval: Solid Food Texture Trial   Mode of Presentation, Solid self-fed   Volume of Solid Food Presented 1 cracker   Oral Phase, Solid  WFL   Pharyngeal Phase, Solid intact   Diagnostic Statement WFL, no overt s/sx of aspiration   Swallow Compensations   Swallow Compensations No compensations were used   Esophageal Phase of Swallow   Patient reports or presents with symptoms of esophageal dysphagia No   Swallow Eval: Clinical Impressions   Skilled Criteria for Therapy Intervention No problems identified which require skilled intervention   Functional Assessment Scale (FAS) 7   Treatment Diagnosis Functional oropharyngeal swallow mechanism   Diet texture recommendations Regular diet;Thin liquids   Recommended Feeding/Eating Techniques small sips/bites  (upright for all PO, slow rate)   Risks and Benefits of Treatment have been explained. Yes   Patient, family and/or staff in agreement with Plan of Care Yes   Clinical Impression Comments SLP: Clinical swallow eval completed per MD order. Pt presents with functional oropharyngeal swallow mechanism. Oral mech exam unremarkable. Assessed with thin liquids, puree, and higher texture solids. Oral phase WFL. Pharyngeal phase WFL, no overt s/sx of aspiration. Recommend regular diet/thin liquids. Ensure pt is fully alert and upright for all PO, taking small bites/sips at slow rate. No additional SLP services indicated.   Total Evaluation Time   Total Evaluation Time (Minutes) 14

## 2019-08-30 NOTE — ANESTHESIA PROCEDURE NOTES
Peripheral Nerve Block Procedure Note    Staff:     Anesthesiologist:  Albert Reyes MD    Resident/CRNA:  James Garcia MD    Block performed by resident/CRNA in the presence of a teaching physician    Location: Pre-op  Procedure Start/Stop TImes:     patient identified, IV checked, site marked, risks and benefits discussed, informed consent, monitors and equipment checked, pre-op evaluation, at physician/surgeon's request and post-op pain management      Correct Patient: Yes      Correct Position: Yes      Correct Site: Yes      Correct Procedure: Yes      Correct Laterality:  Yes    Site Marked:  Yes  Procedure details:     Procedure:  Other (Erector spinae)    ASA:  3    Laterality:  Left    Position:  Sitting    Sterile Prep: chloraprep, mask and sterile gloves      Local skin infiltration:  1% lidocaine    amount (mL):  3    Insertion Site:  T6-7    Needle:  Touhy needle    Needle gauge:  17    Needle length (inches):  3.1    Catheter gauge:  18    Catheter threaded easily: Yes      Threaded to cm at skin:  9    Ultrasound: Yes      Ultrasound used to identify targeted nerve, plexus, or vascular structure and placed a needle adjacent to it      Permanent Image entered into patiient's record      Abnormal pain on injection: No      Blood Aspirated: No      Paresthesias:  No    Bleeding at site: No      Infusion Method:  Continuous Infusion    Secured:  Dermabond    Complications:  None

## 2019-08-31 ENCOUNTER — APPOINTMENT (OUTPATIENT)
Dept: GENERAL RADIOLOGY | Facility: CLINIC | Age: 79
DRG: 164 | End: 2019-08-31
Attending: THORACIC SURGERY (CARDIOTHORACIC VASCULAR SURGERY)
Payer: COMMERCIAL

## 2019-08-31 LAB
ANION GAP SERPL CALCULATED.3IONS-SCNC: 7 MMOL/L (ref 3–14)
ANION GAP SERPL CALCULATED.3IONS-SCNC: 7 MMOL/L (ref 3–14)
BUN SERPL-MCNC: 16 MG/DL (ref 7–30)
BUN SERPL-MCNC: 19 MG/DL (ref 7–30)
CALCIUM SERPL-MCNC: 8.2 MG/DL (ref 8.5–10.1)
CALCIUM SERPL-MCNC: 8.2 MG/DL (ref 8.5–10.1)
CHLORIDE SERPL-SCNC: 101 MMOL/L (ref 94–109)
CHLORIDE SERPL-SCNC: 104 MMOL/L (ref 94–109)
CO2 SERPL-SCNC: 24 MMOL/L (ref 20–32)
CO2 SERPL-SCNC: 24 MMOL/L (ref 20–32)
CREAT SERPL-MCNC: 0.96 MG/DL (ref 0.52–1.04)
CREAT SERPL-MCNC: 1 MG/DL (ref 0.52–1.04)
GFR SERPL CREATININE-BSD FRML MDRD: 53 ML/MIN/{1.73_M2}
GFR SERPL CREATININE-BSD FRML MDRD: 56 ML/MIN/{1.73_M2}
GLUCOSE SERPL-MCNC: 119 MG/DL (ref 70–99)
GLUCOSE SERPL-MCNC: 123 MG/DL (ref 70–99)
MAGNESIUM SERPL-MCNC: 2.2 MG/DL (ref 1.6–2.3)
MAGNESIUM SERPL-MCNC: 2.8 MG/DL (ref 1.6–2.3)
PHOSPHATE SERPL-MCNC: 2.3 MG/DL (ref 2.5–4.5)
POTASSIUM SERPL-SCNC: 3.7 MMOL/L (ref 3.4–5.3)
POTASSIUM SERPL-SCNC: 4 MMOL/L (ref 3.4–5.3)
SODIUM SERPL-SCNC: 132 MMOL/L (ref 133–144)
SODIUM SERPL-SCNC: 134 MMOL/L (ref 133–144)
TROPONIN I SERPL-MCNC: <0.015 UG/L (ref 0–0.04)

## 2019-08-31 PROCEDURE — 83735 ASSAY OF MAGNESIUM: CPT | Performed by: SURGERY

## 2019-08-31 PROCEDURE — 83735 ASSAY OF MAGNESIUM: CPT | Performed by: STUDENT IN AN ORGANIZED HEALTH CARE EDUCATION/TRAINING PROGRAM

## 2019-08-31 PROCEDURE — 12000004 ZZH R&B IMCU UMMC

## 2019-08-31 PROCEDURE — 93005 ELECTROCARDIOGRAM TRACING: CPT

## 2019-08-31 PROCEDURE — 36415 COLL VENOUS BLD VENIPUNCTURE: CPT | Performed by: SURGERY

## 2019-08-31 PROCEDURE — 25000128 H RX IP 250 OP 636: Performed by: SURGERY

## 2019-08-31 PROCEDURE — 36415 COLL VENOUS BLD VENIPUNCTURE: CPT | Performed by: STUDENT IN AN ORGANIZED HEALTH CARE EDUCATION/TRAINING PROGRAM

## 2019-08-31 PROCEDURE — 25000132 ZZH RX MED GY IP 250 OP 250 PS 637: Performed by: STUDENT IN AN ORGANIZED HEALTH CARE EDUCATION/TRAINING PROGRAM

## 2019-08-31 PROCEDURE — 84484 ASSAY OF TROPONIN QUANT: CPT | Performed by: SURGERY

## 2019-08-31 PROCEDURE — 80048 BASIC METABOLIC PNL TOTAL CA: CPT | Performed by: SURGERY

## 2019-08-31 PROCEDURE — 71045 X-RAY EXAM CHEST 1 VIEW: CPT

## 2019-08-31 PROCEDURE — 80048 BASIC METABOLIC PNL TOTAL CA: CPT | Performed by: STUDENT IN AN ORGANIZED HEALTH CARE EDUCATION/TRAINING PROGRAM

## 2019-08-31 PROCEDURE — 71046 X-RAY EXAM CHEST 2 VIEWS: CPT

## 2019-08-31 PROCEDURE — 25000132 ZZH RX MED GY IP 250 OP 250 PS 637: Performed by: SURGERY

## 2019-08-31 PROCEDURE — 84100 ASSAY OF PHOSPHORUS: CPT | Performed by: STUDENT IN AN ORGANIZED HEALTH CARE EDUCATION/TRAINING PROGRAM

## 2019-08-31 PROCEDURE — 93010 ELECTROCARDIOGRAM REPORT: CPT | Performed by: INTERNAL MEDICINE

## 2019-08-31 RX ORDER — HYDRALAZINE HYDROCHLORIDE 20 MG/ML
10 INJECTION INTRAMUSCULAR; INTRAVENOUS EVERY 6 HOURS PRN
Status: DISCONTINUED | OUTPATIENT
Start: 2019-08-31 | End: 2019-09-03 | Stop reason: HOSPADM

## 2019-08-31 RX ADMIN — FUROSEMIDE 20 MG: 20 TABLET ORAL at 09:28

## 2019-08-31 RX ADMIN — ACETAMINOPHEN 975 MG: 325 TABLET, FILM COATED ORAL at 23:02

## 2019-08-31 RX ADMIN — POTASSIUM CHLORIDE 20 MEQ: 750 TABLET, EXTENDED RELEASE ORAL at 10:11

## 2019-08-31 RX ADMIN — MENTHOL 1 PATCH: 205.5 PATCH TOPICAL at 09:29

## 2019-08-31 RX ADMIN — RANITIDINE 150 MG: 150 TABLET ORAL at 19:36

## 2019-08-31 RX ADMIN — ACETAMINOPHEN 975 MG: 325 TABLET, FILM COATED ORAL at 09:28

## 2019-08-31 RX ADMIN — POTASSIUM CHLORIDE 20 MEQ: 750 TABLET, EXTENDED RELEASE ORAL at 23:02

## 2019-08-31 RX ADMIN — ACETAMINOPHEN 975 MG: 325 TABLET, FILM COATED ORAL at 16:12

## 2019-08-31 RX ADMIN — AMLODIPINE BESYLATE 5 MG: 5 TABLET ORAL at 09:27

## 2019-08-31 RX ADMIN — RANITIDINE 150 MG: 150 TABLET ORAL at 09:27

## 2019-08-31 RX ADMIN — LIDOCAINE 1 PATCH: 560 PATCH PERCUTANEOUS; TOPICAL; TRANSDERMAL at 09:28

## 2019-08-31 RX ADMIN — ENOXAPARIN SODIUM 40 MG: 40 INJECTION SUBCUTANEOUS at 09:29

## 2019-08-31 RX ADMIN — SENNOSIDES AND DOCUSATE SODIUM 2 TABLET: 8.6; 5 TABLET ORAL at 10:11

## 2019-08-31 RX ADMIN — ATORVASTATIN CALCIUM 40 MG: 40 TABLET, FILM COATED ORAL at 19:36

## 2019-08-31 ASSESSMENT — PAIN DESCRIPTION - DESCRIPTORS
DESCRIPTORS: ACHING

## 2019-08-31 ASSESSMENT — ACTIVITIES OF DAILY LIVING (ADL)
ADLS_ACUITY_SCORE: 13
ADLS_ACUITY_SCORE: 15

## 2019-08-31 NOTE — PLAN OF CARE
Neuro: A&Ox4. Forgetful, easily reoriented.  Cardiac: Sinus bradycardia with HR in 50-60s. 12 lead EKG done; showed bradycardia.  Some elevated Bps, PRN IV hydralazine ordered.  Respiratory: Sats mid 90s on 0.5L NC.  GI/: pt up to commode to void. Passing flatus but no BM.  Diet/appetite: clear liquid.  Denies nausea.  Activity:  Assist of 1 to commode.  Pain: Denies pain.  Skin: No new deficits noted.  LDA's: Left PIV infusing LR @ 10ml/hr and dilaudid PCA.  Right PIV SL.  Left chest tube to -20 suction. Ropivicaine epidural at 14 mL/hr.     Plan: Continue with POC. Notify primary team with changes.

## 2019-08-31 NOTE — PROVIDER NOTIFICATION
Notified cross cover for high -170 . HR 40-50 . Patient has no PRN BP medication . New order of prn iv hydralazine issued. Will monitor closely

## 2019-08-31 NOTE — PROGRESS NOTES
REGIONAL ANESTHESIA PAIN SERVICE EPIDURAL NOTE  Gabriela Watkins is a 79 year old female POD #1 s/p WEDGE RESECTION, LUNG, THORACOSCOPIC  THORACOTOMY and placement of T6-7 erector spinae catheter for pain management.     SUBJECTIVE  Interval History: Overnight . Patient reports adequate pain control with catheter infusion and current  analgesic medications (see below).  No weakness, paresthesias, circumoral numbness, metallic taste or tinnitus.  Patient is ambulating with assistance.       Clinically Aligned Pain Assessment (CAPA):  Comfort (How is your pain?): Negligible pain  Change in Pain (Since your last medication/intervention?): About the same  Pain Control (How are your pain treatments working?):  Fully effective pain control  Functioning (Are you able to do activities to get better?) : Can do everything I need to do  Sleep (Does your pain management allow you to sleep or rest?): Normal sleep     Pain Intensity using Numerical Rating Scale (NRS):    0/10 at rest and 0/10 with activity      Antithrombotic/Thrombolytic Therapy ordered:  Lovenox.     Analgesic Medications:  Medications related to Pain Management (From now, onward)    Start     Dose/Rate Route Frequency Ordered Stop    08/31/19 0800  Lidocaine (LIDOCARE) 4 % Patch 1 patch      1 patch  over 12 Hours Transdermal EVERY 24 HOURS 0800 08/30/19 1244      08/30/19 2000  senna-docusate (SENOKOT-S/PERICOLACE) 8.6-50 MG per tablet 1 tablet      1 tablet Oral 2 TIMES DAILY 08/30/19 1244      08/30/19 2000  senna-docusate (SENOKOT-S/PERICOLACE) 8.6-50 MG per tablet 2 tablet      2 tablet Oral 2 TIMES DAILY 08/30/19 1244      08/30/19 1400  acetaminophen (TYLENOL) tablet 975 mg      975 mg Oral EVERY 8 HOURS SCHEDULED 08/30/19 1244 09/02/19 0759    08/30/19 1245  lidocaine patch in PLACE       Transdermal EVERY 8 HOURS 08/30/19 1244      08/30/19 1244  lidocaine 1 % 0.1-1 mL      0.1-1 mL Other EVERY 1 HOUR PRN 08/30/19 1244      08/30/19 1244  lidocaine  (LMX4) cream       Topical EVERY 1 HOUR PRN 08/30/19 1244      08/30/19 1244  oxyCODONE (ROXICODONE) tablet 5 mg      5 mg Oral EVERY 4 HOURS PRN 08/30/19 1244      08/30/19 0800  ropivacaine 0.2% (NAROPIN) 750 mL in ON-Q C-Bloc select flow ([id number] holds 600-750 mL) single cath disposable pump      14 mL/hr  Irrigation Continuous Nerve Block 08/30/19 0748             OBJECTIVE  Lab Results:   Recent Labs   Lab Test 08/30/19  1355 08/30/19  0638   HGB  --  11.5*     --        No results found for: INR    Vitals:    Temp:  [36.4  C (97.5  F)-36.9  C (98.4  F)] 36.4  C (97.5  F)  Pulse:  [56-84] 59  Heart Rate:  [55-62] 60  Resp:  [16-18] 16  BP: (117-173)/(53-85) 145/64  SpO2:  [95 %-100 %] 95 %  BP (!) 145/64   Pulse 59   Temp 36.4  C (97.5  F) (Oral)   Resp 16   Wt 67.4 kg (148 lb 8.7 oz)   SpO2 95%   BMI 30.25 kg/m         Exam:   GEN: alert and no distress  NEURO/MSK:   Strength BLE 5/5  and overall symmetric  SKIN: Epidural catheter site with dressing c/d/i, no tenderness, erythema, heme, edema     ASSESSMENT/PLAN:    Patient is receiving adequate analgesia with current multimodal therapy including catheter infusion of Ropivacaine 0.2%  at 14mL/hr.  Motor function  and adequate sensory block,  meeting activity goals.  No evidence of adverse side effects related to local anesthetic.  - continue current epidural infusion Ropivacaine 0.2% at 14 mL/hour, POD #1  - antithrombotic/thrombolytic therapy  ordered. Please contact RAPS (#[phone]) prior to any medication changes  - will continue to follow and adjust as needed      - discussed plan with attending anesthesiologist    Cuco Bernal MD  Regional Anesthesia Pain Service  8/31/2019 2:01 PM    RAPS Contact Info (24 hour job code pager is the last 4 digits) For in-house use only:   [employer] phone: [place] [phone], [facility] [place] [phone], [facility] [phone], then enter call-back number.    Text: Use [employer] on the Intranet <Paging/Directory> tab and enter Jobcode  ID.   If no call back at any time, contact the hospital  and ask for RAPS attending or backup

## 2019-09-01 ENCOUNTER — APPOINTMENT (OUTPATIENT)
Dept: GENERAL RADIOLOGY | Facility: CLINIC | Age: 79
DRG: 164 | End: 2019-09-01
Attending: THORACIC SURGERY (CARDIOTHORACIC VASCULAR SURGERY)
Payer: COMMERCIAL

## 2019-09-01 LAB
ANION GAP SERPL CALCULATED.3IONS-SCNC: 8 MMOL/L (ref 3–14)
BUN SERPL-MCNC: 13 MG/DL (ref 7–30)
CALCIUM SERPL-MCNC: 8.1 MG/DL (ref 8.5–10.1)
CHLORIDE SERPL-SCNC: 105 MMOL/L (ref 94–109)
CO2 SERPL-SCNC: 21 MMOL/L (ref 20–32)
CREAT SERPL-MCNC: 0.82 MG/DL (ref 0.52–1.04)
ERYTHROCYTE [DISTWIDTH] IN BLOOD BY AUTOMATED COUNT: 14.1 % (ref 10–15)
GFR SERPL CREATININE-BSD FRML MDRD: 67 ML/MIN/{1.73_M2}
GLUCOSE SERPL-MCNC: 126 MG/DL (ref 70–99)
HCT VFR BLD AUTO: 29.9 % (ref 35–47)
HGB BLD-MCNC: 9.5 G/DL (ref 11.7–15.7)
MCH RBC QN AUTO: 29.9 PG (ref 26.5–33)
MCHC RBC AUTO-ENTMCNC: 31.8 G/DL (ref 31.5–36.5)
MCV RBC AUTO: 94 FL (ref 78–100)
PHOSPHATE SERPL-MCNC: 3.3 MG/DL (ref 2.5–4.5)
PLATELET # BLD AUTO: 187 10E9/L (ref 150–450)
POTASSIUM SERPL-SCNC: 4.3 MMOL/L (ref 3.4–5.3)
RBC # BLD AUTO: 3.18 10E12/L (ref 3.8–5.2)
SODIUM SERPL-SCNC: 134 MMOL/L (ref 133–144)
TROPONIN I SERPL-MCNC: <0.015 UG/L (ref 0–0.04)
WBC # BLD AUTO: 8 10E9/L (ref 4–11)

## 2019-09-01 PROCEDURE — 25000132 ZZH RX MED GY IP 250 OP 250 PS 637: Performed by: STUDENT IN AN ORGANIZED HEALTH CARE EDUCATION/TRAINING PROGRAM

## 2019-09-01 PROCEDURE — 25000128 H RX IP 250 OP 636: Performed by: STUDENT IN AN ORGANIZED HEALTH CARE EDUCATION/TRAINING PROGRAM

## 2019-09-01 PROCEDURE — 80048 BASIC METABOLIC PNL TOTAL CA: CPT | Performed by: SURGERY

## 2019-09-01 PROCEDURE — 25000125 ZZHC RX 250: Performed by: STUDENT IN AN ORGANIZED HEALTH CARE EDUCATION/TRAINING PROGRAM

## 2019-09-01 PROCEDURE — 27110038 ZZH RX 271: Performed by: ANESTHESIOLOGY

## 2019-09-01 PROCEDURE — 25800030 ZZH RX IP 258 OP 636: Performed by: STUDENT IN AN ORGANIZED HEALTH CARE EDUCATION/TRAINING PROGRAM

## 2019-09-01 PROCEDURE — 84484 ASSAY OF TROPONIN QUANT: CPT | Performed by: SURGERY

## 2019-09-01 PROCEDURE — 12000004 ZZH R&B IMCU UMMC

## 2019-09-01 PROCEDURE — 25000132 ZZH RX MED GY IP 250 OP 250 PS 637: Performed by: SURGERY

## 2019-09-01 PROCEDURE — 25000125 ZZHC RX 250: Performed by: ANESTHESIOLOGY

## 2019-09-01 PROCEDURE — 84100 ASSAY OF PHOSPHORUS: CPT | Performed by: SURGERY

## 2019-09-01 PROCEDURE — 93010 ELECTROCARDIOGRAM REPORT: CPT | Performed by: INTERNAL MEDICINE

## 2019-09-01 PROCEDURE — 93005 ELECTROCARDIOGRAM TRACING: CPT

## 2019-09-01 PROCEDURE — 36415 COLL VENOUS BLD VENIPUNCTURE: CPT | Performed by: SURGERY

## 2019-09-01 PROCEDURE — 71046 X-RAY EXAM CHEST 2 VIEWS: CPT

## 2019-09-01 PROCEDURE — 85027 COMPLETE CBC AUTOMATED: CPT | Performed by: THORACIC SURGERY (CARDIOTHORACIC VASCULAR SURGERY)

## 2019-09-01 PROCEDURE — 40000556 ZZH STATISTIC PERIPHERAL IV START W US GUIDANCE

## 2019-09-01 RX ORDER — FUROSEMIDE 20 MG
20 TABLET ORAL ONCE
Status: COMPLETED | OUTPATIENT
Start: 2019-09-01 | End: 2019-09-01

## 2019-09-01 RX ADMIN — ACETAMINOPHEN 975 MG: 325 TABLET, FILM COATED ORAL at 16:42

## 2019-09-01 RX ADMIN — LIDOCAINE 1 PATCH: 560 PATCH PERCUTANEOUS; TOPICAL; TRANSDERMAL at 09:15

## 2019-09-01 RX ADMIN — ENOXAPARIN SODIUM 40 MG: 40 INJECTION SUBCUTANEOUS at 20:33

## 2019-09-01 RX ADMIN — AMLODIPINE BESYLATE 5 MG: 5 TABLET ORAL at 09:14

## 2019-09-01 RX ADMIN — RANITIDINE 150 MG: 150 TABLET ORAL at 09:14

## 2019-09-01 RX ADMIN — Medication: at 23:37

## 2019-09-01 RX ADMIN — POTASSIUM PHOSPHATE, MONOBASIC AND POTASSIUM PHOSPHATE, DIBASIC 15 MMOL: 224; 236 INJECTION, SOLUTION INTRAVENOUS at 01:41

## 2019-09-01 RX ADMIN — MENTHOL 1 PATCH: 205.5 PATCH TOPICAL at 09:15

## 2019-09-01 RX ADMIN — ACETAMINOPHEN 975 MG: 325 TABLET, FILM COATED ORAL at 23:37

## 2019-09-01 RX ADMIN — HYDRALAZINE HYDROCHLORIDE 10 MG: 20 INJECTION INTRAMUSCULAR; INTRAVENOUS at 23:39

## 2019-09-01 RX ADMIN — ATORVASTATIN CALCIUM 40 MG: 40 TABLET, FILM COATED ORAL at 20:33

## 2019-09-01 RX ADMIN — FUROSEMIDE 20 MG: 20 TABLET ORAL at 09:20

## 2019-09-01 RX ADMIN — ACETAMINOPHEN 975 MG: 325 TABLET, FILM COATED ORAL at 09:14

## 2019-09-01 RX ADMIN — RANITIDINE 150 MG: 150 TABLET ORAL at 20:34

## 2019-09-01 RX ADMIN — Medication: at 16:54

## 2019-09-01 RX ADMIN — FUROSEMIDE 20 MG: 20 TABLET ORAL at 09:14

## 2019-09-01 ASSESSMENT — ACTIVITIES OF DAILY LIVING (ADL)
ADLS_ACUITY_SCORE: 17
ADLS_ACUITY_SCORE: 15
ADLS_ACUITY_SCORE: 15
ADLS_ACUITY_SCORE: 17
ADLS_ACUITY_SCORE: 17
ADLS_ACUITY_SCORE: 15

## 2019-09-01 ASSESSMENT — PAIN DESCRIPTION - DESCRIPTORS
DESCRIPTORS: ACHING

## 2019-09-01 NOTE — PROGRESS NOTES
THORACIC & FOREGUT SURGERY    S: overnight pt went into afib, rate controlled, now back in sinus. This am pain is adequately controlled. Tolerating regular diet, no nausea. Having bowel movement, adequate UOP.    O:  Vitals:    08/31/19 2301 09/01/19 0210 09/01/19 0321 09/01/19 0800   BP: 135/79  (!) 136/92 (!) 143/68   BP Location: Right arm  Right arm Right arm   Pulse: 68  97 67   Resp:   18 18   Temp: 98.3  F (36.8  C)  98.1  F (36.7  C) 98.3  F (36.8  C)   TempSrc: Oral  Oral Oral   SpO2: 97%  97% 98%   Weight:  68.3 kg (150 lb 9.2 oz)           GEN: NAD  CV: regular rate and rhythm, Peripheral pulses intact  RESP: Nonlabored breathing on RA, L CT to water seal, s/s, no leak. Chest wall appropriately tender to palpation. Incision c/d/i  ABD: soft, non-tender  Ext: wwp. No edema  PSYCH: cooperative      L CT to WS, 270ccs/24hrs s/s output, no leak.      A/P: Gabriela Watkins is an 79 year old female with history of HTN, cholecystectomy secondary to ruptured gallbladder 6/2019 with post-op course c/b by CVA, now POD 2 s/p L VATS superior trisegmentectomy. 8/31 patient went into atrial fibrillation, HR 80-100s, now back in sinus     - CT to WS, will follow up am CXR and pull if fine  - tylenol, oxy PRN, on-Q per anesthesia, lidocaine patch  - additional 20mg Lasix  - regular diet, bowel regimen, replete lytes PRN  - hold LVX for potential removal on-Q  - continue to hold Zofran and PTA atenolol i/s/o prolonged QT and bradycardia post-op  - appreciate cards recs for new onset atrial fibrillation        Marily Liang MD  Thoracic Resident [phone]

## 2019-09-01 NOTE — PLAN OF CARE
Blood pressure 128/68, pulse 69, temperature 97.7  F (36.5  C), temperature source Oral, resp. rate 20, weight 68.3 kg (150 lb 9.2 oz), SpO2 98 %, not currently breastfeeding.   Pt VSS, on RA.  HR SB/SR with one less than one minute run of A. Flutter.  Pain controlled with lidocaine patch and scheduled Tylenol.  L CT to water seal, CDI.  On-Q intact, refilled.  Up to chair multiple times during the day.  Ambulated in halls with SBA x 2.  Fair appetite, needs encouragement to eat.  Adequate UOP.  BM x 3.  Family at bedside throughout the day.  Bed alarm on.  Call light within reach.  Continue with plan of care.

## 2019-09-01 NOTE — PROVIDER NOTIFICATION
MD notified via web-based paging regarding update received from telemetry monitoring. Noticed to be in and out of A.fib since 1600. Sinus dejah previously with HR in 50-60s, now HR 90-100s.   No prior hx of A.fib.   Will continue to monitor and follow POC.

## 2019-09-01 NOTE — PLAN OF CARE
[phone]: POD#2 L) VATS and superior trisegmentectomy.  Neuro:  Disoriented to situation and time. Redirectable. Impulsive, setting bed alarm off multiple times.  Cardiac: Patient converted to A.Fib around 1600 yesterday. Rate controlled 90-100s. No previous history of A.fib. Cross-cover notified and STAT EKG, CXR, and labs. Phos and K+ replaced per protocol. Denies chest pain, palpitations, or SOB. L) pleural CT to waterseal.  Resp: RA  GI/: Voiding adequate amounts, LBM 8/31/19. Using bedside commode. Refused stool softeners at HS.   Diet/Appetite: Tolerating regular diet, fair appetite. Denies nausea.  Skin: No issues.  Access: L) PIV SL  Drains: L) pleural CT to waterseal.  Activity: Up SBA, bed alarm for safety.  Pain: Neck cramping pain relieved with heat packs.    Will continue with plan of care and notify MD of any changes.

## 2019-09-01 NOTE — PLAN OF CARE
Blood pressure 119/57, pulse 59, temperature 98  F (36.7  C), temperature source Oral, resp. rate 20, weight 67.4 kg (148 lb 8.7 oz), SpO2 95 %, not currently breastfeeding.  VSS, on RA, HR dejah 50-60.  Disoriented to time, forgetful.  C/o mild pain with movement, scheduled Tylenol given, lidocaine patch on, no oxycodone needed.  L CT to water seal, small air leak noted.  Up in chair for a few hours this evening.  Ambulated in halls with SBA x 2, tolerated well.  Poor appetite, eating small amounts.  Adequate UOP.  +flatus, small BM.  Family at bedside most of the day.  Call light within reach.  Continue with plan of care.

## 2019-09-01 NOTE — PROGRESS NOTES
REGIONAL ANESTHESIA PAIN SERVICE EPIDURAL NOTE  Gabriela Watkins is a 79 year old female POD #2 s/p WEDGE RESECTION, LUNG, THORACOSCOPIC  THORACOTOMY and placement of T6-7 erector spinae catheter for pain management.     SUBJECTIVE  Interval History: Overnight . Patient reports adequate pain control with catheter infusion and current  analgesic medications (see below).  No weakness, paresthesias, circumoral numbness, metallic taste or tinnitus.  Patient is ambulating with assistance.                   Clinically Aligned Pain Assessment (CAPA):  Comfort (How is your pain?): Negligible pain  Change in Pain (Since your last medication/intervention?): About the same  Pain Control (How are your pain treatments working?):  Fully effective pain control  Functioning (Are you able to do activities to get better?) : Can do everything I need to do  Sleep (Does your pain management allow you to sleep or rest?): Normal sleep                 Pain Intensity using Numerical Rating Scale (NRS):    0/10 at rest and 0/10 with activity        Antithrombotic/Thrombolytic Therapy ordered:  Lovenox.      Analgesic Medications:                                           Medications related to Pain Management (From now, onward)     Start     Dose/Rate Route Frequency Ordered Stop     08/31/19 0800   Lidocaine (LIDOCARE) 4 % Patch 1 patch      1 patch  over 12 Hours Transdermal EVERY 24 HOURS 0800 08/30/19 1244       08/30/19 2000   senna-docusate (SENOKOT-S/PERICOLACE) 8.6-50 MG per tablet 1 tablet      1 tablet Oral 2 TIMES DAILY 08/30/19 1244       08/30/19 2000   senna-docusate (SENOKOT-S/PERICOLACE) 8.6-50 MG per tablet 2 tablet      2 tablet Oral 2 TIMES DAILY 08/30/19 1244       08/30/19 1400   acetaminophen (TYLENOL) tablet 975 mg      975 mg Oral EVERY 8 HOURS SCHEDULED 08/30/19 1244 09/02/19 0759     08/30/19 1245   lidocaine patch in PLACE        Transdermal EVERY 8 HOURS 08/30/19 1244       08/30/19 1244   lidocaine 1 % 0.1-1  "mL      0.1-1 mL Other EVERY 1 HOUR PRN 08/30/19 1244       08/30/19 1244   lidocaine (LMX4) cream        Topical EVERY 1 HOUR PRN 08/30/19 1244       08/30/19 1244   oxyCODONE (ROXICODONE) tablet 5 mg      5 mg Oral EVERY 4 HOURS PRN 08/30/19 1244       08/30/19 0800   ropivacaine 0.2% (NAROPIN) 750 mL in ON-Q C-Bloc select flow ([id number] holds 600-750 mL) single cath disposable pump      14 mL/hr  Irrigation Continuous Nerve Block 08/30/19 0748               OBJECTIVE  Lab Results:   Lab Results   Component Value Date    WBC 8.0 09/01/2019     Lab Results   Component Value Date    RBC 3.18 09/01/2019     Lab Results   Component Value Date    HGB 9.5 09/01/2019     Lab Results   Component Value Date    HCT 29.9 09/01/2019     No components found for: MCT  Lab Results   Component Value Date    MCV 94 09/01/2019     Lab Results   Component Value Date    MCH 29.9 09/01/2019     Lab Results   Component Value Date    MCHC 31.8 09/01/2019     Lab Results   Component Value Date    RDW 14.1 09/01/2019     Lab Results   Component Value Date     09/01/2019       No results found for: INR                              No results found for: INR  Vital signs:  Temp: 36.6  C (97.8  F) Temp src: Oral BP: 117/81(Simultaneous filing. User may be unaware of other data.) Pulse: 97 Heart Rate: 60 Resp: 18 SpO2: 98 % O2 Device: None (Room air) Oxygen Delivery: 1/2 LPM   Weight: 68.3 kg (150 lb 9.2 oz)  Estimated body mass index is 30.66 kg/m  as calculated from the following:    Height as of 8/23/19: 1.493 m (4' 10.76\").    Weight as of this encounter: 68.3 kg (150 lb 9.2 oz).             Exam:   GEN: alert and no distress  NEURO/MSK:   Strength BLE 5/5  and overall symmetric  SKIN: Epidural catheter site with dressing c/d/i, no tenderness, erythema, heme, edema       ASSESSMENT/PLAN:    Patient is POD#2  receiving adequate analgesia with current multimodal therapy including catheter infusion of Ropivacaine 0.2%  at 14mL/hr.  Motor " function  and adequate sensory block,  meeting activity goals.  No evidence of adverse side effects related to local anesthetic.  - continue current epidural infusion Ropivacaine 0.2% at 14 mL/hour, POD #1  - antithrombotic/thrombolytic therapy  ordered. Please contact [facility]APS (#[phone]) prior to any medication changes    Ricardo Kelley MD  RAPS Service

## 2019-09-01 NOTE — PROGRESS NOTES
Cross-cover note: 12:14 AM 9/1/2019 09/01/2019    General Surgery Cross Cover Note    Called by nursing regarding new onset atrial fibrillation    Per patient feels tired, otherwise no complaints. No chest pain or palpitations. No cardiac history, no hx arrhythmias.  SOB is stable.    B/P: 135/79, T: 98.3, P: 68, R: 16    PE:  A&O x3, NAD  Breathing non-labored, wheezing noted L>R upper air fields, crackles L lung base, R lung base no crackles.  CT present L chest.  Occasional cough.  CV: irregularly irregular, no murmurs noted   Extr. Warm to touch    Plan:  - EKG with afib   - replete lytes  - await CXR read  - D/w overnight cardiology fellow: consider metoprolol 12.5mg BID to prevent RVR.  If does not convert to NSR within 24h, recommend 1 month anticoagulation and outpatient follow-up to evaluate for continuation of anticoagulation.    D/w surgery tess resident and thoracic fellow Dr. Rosales Krause MD  Surgery Resident PGY-1  Pg [phone]

## 2019-09-01 NOTE — PROGRESS NOTES
Late entry    THORACIC & FOREGUT SURGERY    S: hydral x1 overnight. Bps improved. Seen and examined.  Doing well. Chest wall pain tolerable. No nausea or vomiting.       Vitals:    08/31/19 0930 08/31/19 1200   BP:  (!) 145/64   BP Location:     Pulse:     Resp:  16   Temp:  97.5  F (36.4  C)   TempSrc:  Oral   SpO2: 97% 95%   Weight:           GEN: NAD  CV: RRR, Peripheral pulses intact  RESP: Nonlabored breathing on 1/2L, L CT in place to suction, s/s, no leak. Chest wall appropriately tender to palpation. Incision c/d/i  ABD: soft, non-tender  Ext: wwp. No edema  PSYCH: cooperative      L CT to -20, s/s output, no leak. Placed to WS.     A/P: Gabriela Watkins is an 79 year old female with history of CVA, HTN POD 1 s/p L VATS superior trisegmentectomy.      - tylenol, oxy PRN, on-Q per anesthesia, lidocaine patch  - regular diet  - LVX PPX   - CT to WS  - continue to hold Zofran and PTA atenolol i/s/o prolonged QT and bradycardia post-op       Marily Liang MD  Thoracic Resident

## 2019-09-01 NOTE — OP NOTE
Preoperative diagnosis: LEFT upper lobe non-small cell carcinoma  Postoperative diagnosis: LEFT upper lobe non-small cell carcinoma  Procedure:   1) Flexible bronchoscopy  2) Uniportal thoracoscopic LEFT upper lobe trisegmentectomy (S1-3)   3) Mediastinal lymph node sampling  Anesthesia: General   Surgeon: Bernard Rosado  Resident surgeon: Beatriz Caba  EBL: 350 ml  Complications: none immediate  Findings: margin grossly negative  Bronchoscopy: no endobronchial lesions, we verified bronchoscopically that the lingula was patent prior to firing the stapler across B1-3  Thoracoscopy: straightforward, friable tissue  Patient tolerance: Ms. Watkins tolerated the procedure well  Brief history  Ms. Watkins is a 79 year-old female who is high risk for a CV event and has limited respiratory reserve. We performed a mediastinoscopy at another date and all her LN were negative for malignancy. Given her underlying comorbidities and negative mediastinoscopy, I decided to only perform LN sampling of station 5/6 instead of a formal LN dissection to expedite the procedure.  Description of procedure  We first performed a bronchoscopy to verify double-lumen endotracheal tube position and to examine the airways with the above-mentioned findings.  We then secured Ms. Watkins in the RIGHT lateral position and the LEFT chest was prepared and draped.   We made a single 4 cm access incision in the 5th intercostal space and split the chest wall muscles. We then incised the intercostal muscle along the rib for 15-20cm and placed a wound protector.  We then clearly dissected and identified the bronchovascular structures of the LEFT upper lobe by performing a thorough hilar lymph node dissection. We then placed 2 hemolocks on the cardiac side and one on the specimen side on the LEFT upper lobe V1-3 while clearly preserving the lingular vein, and transected it. Next, we identified the first branch off the PA, clipped it and transected it, and then  we identified a single posterior (A1-2c) branch and transected it after clipping it.   The S1-3 bronchus was clearly identified and a stapler placed across it without firing. Prior to firing the stapler, we performed a flexible bronchoscopy to verify that the LEFT lingular bronchus was widely patent and then fired the stapler. We divided the parenchyma with a stapler as well. Finally, we removed the specimen through the wound protector.  We lymph nodes in stations 5-6.  At the end of the procedure, we verified that the lung insufflated well and that the orientation of the remaining lung was appropriate. We then placed a chest tube and closed with absorbable sutures.

## 2019-09-01 NOTE — PROGRESS NOTES
REGIONAL ANESTHESIA PAIN SERVICE EPIDURAL NOTE  Gabriela Watkins is a 79 year old female POD #1 s/p WEDGE RESECTION, LUNG, THORACOSCOPIC  THORACOTOMY and placement of T6-7 erector spinae catheter for pain management.     SUBJECTIVE  Interval History: Overnight . Patient reports adequate pain control with catheter infusion and current  analgesic medications (see below).  No weakness, paresthesias, circumoral numbness, metallic taste or tinnitus.  Patient is ambulating with assistance.                   Clinically Aligned Pain Assessment (CAPA):  Comfort (How is your pain?): Negligible pain  Change in Pain (Since your last medication/intervention?): About the same  Pain Control (How are your pain treatments working?):  Fully effective pain control  Functioning (Are you able to do activities to get better?) : Can do everything I need to do  Sleep (Does your pain management allow you to sleep or rest?): Normal sleep                 Pain Intensity using Numerical Rating Scale (NRS):    0/10 at rest and 0/10 with activity        Antithrombotic/Thrombolytic Therapy ordered:  Lovenox.      Analgesic Medications:              Medications related to Pain Management (From now, onward)     Start     Dose/Rate Route Frequency Ordered Stop     08/31/19 0800   Lidocaine (LIDOCARE) 4 % Patch 1 patch      1 patch  over 12 Hours Transdermal EVERY 24 HOURS 0800 08/30/19 1244       08/30/19 2000   senna-docusate (SENOKOT-S/PERICOLACE) 8.6-50 MG per tablet 1 tablet      1 tablet Oral 2 TIMES DAILY 08/30/19 1244       08/30/19 2000   senna-docusate (SENOKOT-S/PERICOLACE) 8.6-50 MG per tablet 2 tablet      2 tablet Oral 2 TIMES DAILY 08/30/19 1244       08/30/19 1400   acetaminophen (TYLENOL) tablet 975 mg      975 mg Oral EVERY 8 HOURS SCHEDULED 08/30/19 1244 09/02/19 0759     08/30/19 1245   lidocaine patch in PLACE        Transdermal EVERY 8 HOURS 08/30/19 1244       08/30/19 1244   lidocaine 1 % 0.1-1 mL      0.1-1 mL Other EVERY 1  HOUR PRN 08/30/19 1244       08/30/19 1244   lidocaine (LMX4) cream        Topical EVERY 1 HOUR PRN 08/30/19 1244       08/30/19 1244   oxyCODONE (ROXICODONE) tablet 5 mg      5 mg Oral EVERY 4 HOURS PRN 08/30/19 1244       08/30/19 0800   ropivacaine 0.2% (NAROPIN) 750 mL in ON-Q C-Bloc select flow ([id number] holds 600-750 mL) single cath disposable pump      14 mL/hr  Irrigation Continuous Nerve Block 08/30/19 0748               OBJECTIVE  Lab Results:   Recent Labs   Lab Test 08/30/19  1355 08/30/19  0638   HGB  --  11.5*     --          No results found for: INR     Vitals:              Temp:  [36.4  C (97.5  F)-36.9  C (98.4  F)] 36.4  C (97.5  F)  Pulse:  [56-84] 59  Heart Rate:  [55-62] 60  Resp:  [16-18] 16  BP: (117-173)/(53-85) 145/64  SpO2:  [95 %-100 %] 95 %  BP (!) 145/64   Pulse 59   Temp 36.4  C (97.5  F) (Oral)   Resp 16   Wt 67.4 kg (148 lb 8.7 oz)   SpO2 95%   BMI 30.25 kg/m          Exam:   GEN: alert and no distress  NEURO/MSK:   Strength BLE 5/5  and overall symmetric  SKIN: Epidural catheter site with dressing c/d/i, no tenderness, erythema, heme, edema       ASSESSMENT/PLAN:    Patient is receiving adequate analgesia with current multimodal therapy including catheter infusion of Ropivacaine 0.2%  at 14mL/hr.  Motor function  and adequate sensory block,  meeting activity goals.  No evidence of adverse side effects related to local anesthetic.  - continue current epidural infusion Ropivacaine 0.2% at 14 mL/hour, POD #1  - antithrombotic/thrombolytic therapy  ordered. Please contact RAPS (#[phone]) prior to any medication changes  - will continue to follow and adjust as needed     Ricardo Kelley MD  RAPS Service

## 2019-09-02 ENCOUNTER — APPOINTMENT (OUTPATIENT)
Dept: GENERAL RADIOLOGY | Facility: CLINIC | Age: 79
DRG: 164 | End: 2019-09-02
Attending: THORACIC SURGERY (CARDIOTHORACIC VASCULAR SURGERY)
Payer: COMMERCIAL

## 2019-09-02 LAB
ANION GAP SERPL CALCULATED.3IONS-SCNC: 9 MMOL/L (ref 3–14)
BUN SERPL-MCNC: 12 MG/DL (ref 7–30)
CALCIUM SERPL-MCNC: 8.2 MG/DL (ref 8.5–10.1)
CHLORIDE SERPL-SCNC: 104 MMOL/L (ref 94–109)
CO2 SERPL-SCNC: 22 MMOL/L (ref 20–32)
CREAT SERPL-MCNC: 0.79 MG/DL (ref 0.52–1.04)
GFR SERPL CREATININE-BSD FRML MDRD: 70 ML/MIN/{1.73_M2}
GLUCOSE BLDC GLUCOMTR-MCNC: 126 MG/DL (ref 70–99)
GLUCOSE SERPL-MCNC: 90 MG/DL (ref 70–99)
INTERPRETATION ECG - MUSE: NORMAL
MAGNESIUM SERPL-MCNC: 2.1 MG/DL (ref 1.6–2.3)
PHOSPHATE SERPL-MCNC: 2.4 MG/DL (ref 2.5–4.5)
PLATELET # BLD AUTO: 182 10E9/L (ref 150–450)
POTASSIUM SERPL-SCNC: 3.6 MMOL/L (ref 3.4–5.3)
SODIUM SERPL-SCNC: 135 MMOL/L (ref 133–144)
TROPONIN I SERPL-MCNC: <0.015 UG/L (ref 0–0.04)

## 2019-09-02 PROCEDURE — 84100 ASSAY OF PHOSPHORUS: CPT | Performed by: SURGERY

## 2019-09-02 PROCEDURE — 71046 X-RAY EXAM CHEST 2 VIEWS: CPT

## 2019-09-02 PROCEDURE — 25800030 ZZH RX IP 258 OP 636: Performed by: STUDENT IN AN ORGANIZED HEALTH CARE EDUCATION/TRAINING PROGRAM

## 2019-09-02 PROCEDURE — 12000004 ZZH R&B IMCU UMMC

## 2019-09-02 PROCEDURE — 00000146 ZZHCL STATISTIC GLUCOSE BY METER IP

## 2019-09-02 PROCEDURE — 93005 ELECTROCARDIOGRAM TRACING: CPT

## 2019-09-02 PROCEDURE — 84484 ASSAY OF TROPONIN QUANT: CPT | Performed by: SURGERY

## 2019-09-02 PROCEDURE — 80048 BASIC METABOLIC PNL TOTAL CA: CPT | Performed by: SURGERY

## 2019-09-02 PROCEDURE — 25000128 H RX IP 250 OP 636: Performed by: STUDENT IN AN ORGANIZED HEALTH CARE EDUCATION/TRAINING PROGRAM

## 2019-09-02 PROCEDURE — 71046 X-RAY EXAM CHEST 2 VIEWS: CPT | Mod: 77

## 2019-09-02 PROCEDURE — 93010 ELECTROCARDIOGRAM REPORT: CPT | Performed by: INTERNAL MEDICINE

## 2019-09-02 PROCEDURE — 36415 COLL VENOUS BLD VENIPUNCTURE: CPT | Performed by: SURGERY

## 2019-09-02 PROCEDURE — 85049 AUTOMATED PLATELET COUNT: CPT | Performed by: SURGERY

## 2019-09-02 PROCEDURE — 25000125 ZZHC RX 250: Performed by: STUDENT IN AN ORGANIZED HEALTH CARE EDUCATION/TRAINING PROGRAM

## 2019-09-02 PROCEDURE — 25000132 ZZH RX MED GY IP 250 OP 250 PS 637: Performed by: SURGERY

## 2019-09-02 PROCEDURE — 25000132 ZZH RX MED GY IP 250 OP 250 PS 637: Performed by: STUDENT IN AN ORGANIZED HEALTH CARE EDUCATION/TRAINING PROGRAM

## 2019-09-02 PROCEDURE — 83735 ASSAY OF MAGNESIUM: CPT | Performed by: SURGERY

## 2019-09-02 RX ORDER — AMLODIPINE BESYLATE 10 MG/1
10 TABLET ORAL DAILY
Status: DISCONTINUED | OUTPATIENT
Start: 2019-09-03 | End: 2019-09-03 | Stop reason: HOSPADM

## 2019-09-02 RX ORDER — CARVEDILOL 12.5 MG/1
12.5 TABLET ORAL 2 TIMES DAILY WITH MEALS
Status: DISCONTINUED | OUTPATIENT
Start: 2019-09-02 | End: 2019-09-02

## 2019-09-02 RX ORDER — ATENOLOL 100 MG/1
100 TABLET ORAL
Status: DISCONTINUED | OUTPATIENT
Start: 2019-09-02 | End: 2019-09-02

## 2019-09-02 RX ORDER — ATENOLOL 50 MG/1
100 TABLET ORAL DAILY
Status: DISCONTINUED | OUTPATIENT
Start: 2019-09-02 | End: 2019-09-02

## 2019-09-02 RX ADMIN — ENOXAPARIN SODIUM 40 MG: 40 INJECTION SUBCUTANEOUS at 20:22

## 2019-09-02 RX ADMIN — RANITIDINE 150 MG: 150 TABLET ORAL at 08:21

## 2019-09-02 RX ADMIN — CARVEDILOL 12.5 MG: 12.5 TABLET, FILM COATED ORAL at 09:22

## 2019-09-02 RX ADMIN — MENTHOL 1 PATCH: 205.5 PATCH TOPICAL at 08:21

## 2019-09-02 RX ADMIN — HYDRALAZINE HYDROCHLORIDE 10 MG: 20 INJECTION INTRAMUSCULAR; INTRAVENOUS at 23:55

## 2019-09-02 RX ADMIN — MENTHOL 1 PATCH: 205.5 PATCH TOPICAL at 00:29

## 2019-09-02 RX ADMIN — AMLODIPINE BESYLATE 5 MG: 5 TABLET ORAL at 08:21

## 2019-09-02 RX ADMIN — RANITIDINE 150 MG: 150 TABLET ORAL at 21:00

## 2019-09-02 RX ADMIN — POTASSIUM PHOSPHATE, MONOBASIC AND POTASSIUM PHOSPHATE, DIBASIC 15 MMOL: 224; 236 INJECTION, SOLUTION INTRAVENOUS at 13:39

## 2019-09-02 RX ADMIN — LIDOCAINE 1 PATCH: 560 PATCH PERCUTANEOUS; TOPICAL; TRANSDERMAL at 08:22

## 2019-09-02 RX ADMIN — POTASSIUM CHLORIDE 20 MEQ: 750 TABLET, EXTENDED RELEASE ORAL at 13:39

## 2019-09-02 RX ADMIN — FUROSEMIDE 20 MG: 20 TABLET ORAL at 08:21

## 2019-09-02 RX ADMIN — ATORVASTATIN CALCIUM 40 MG: 40 TABLET, FILM COATED ORAL at 20:59

## 2019-09-02 RX ADMIN — OXYCODONE HYDROCHLORIDE 5 MG: 5 TABLET ORAL at 00:48

## 2019-09-02 ASSESSMENT — PAIN DESCRIPTION - DESCRIPTORS: DESCRIPTORS: ACHING

## 2019-09-02 ASSESSMENT — ACTIVITIES OF DAILY LIVING (ADL)
ADLS_ACUITY_SCORE: 15

## 2019-09-02 NOTE — PROVIDER NOTIFICATION
Dr. Liang notified of pt being in A.Fib since about 0828 per tele monitor.  Has been in A. Fib intermittently since 0700 but not sustained until now.  Pt asymptomatic, denies CP, SOB, palpitations.  EKG ordered and restarted Atenolol.  Continue to monitor.

## 2019-09-02 NOTE — PLAN OF CARE
Blood pressure 109/61, pulse 105, temperature 97.5  F (36.4  C), temperature source Oral, resp. rate 18, weight 67.4 kg (148 lb 9.4 oz), SpO2 99 %, not currently breastfeeding.  Pt HR in A. Fib this morning around 0830 and sustained most of the afternoon, currently in SR.  MD notified and EKG done, Coreg given.  K+ and phos replaced per protocol, Will just continue to monitor and pt will follow up as outpatient.  Pt had one episode of dizziness while on commode, pt was slow to respond with eyes glazed over for about a minute.  Symptoms resolved and back to baseline once lying in bed.  Up in chair, ambulating to halls.  CXR done this afternoon after having CT clamped for 4 hours, possible removal of CT later.  Poor appetite, needs encouragement to eat.  Family at bedside all day.  Call light within reach.  Continue with plan of care.

## 2019-09-02 NOTE — PROGRESS NOTES
REGIONAL ANESTHESIA PAIN SERVICE NOTE    Followed up on patient with Left T5-T6 erector spinae catheter for post operative pain management. She is POD #3 s/p L VATS superior trisegmentectomy.     Interval History:   Patient denied pain at thoracic cage. 0/10 at rest or with activity.   She did had some lower extremity cramping type of pain which resolved after taking a pill.       OBJECTIVE:    Diagnostic:  Lab Results   Component Value Date    WBC 8.0 09/01/2019     Lab Results   Component Value Date    RBC 3.18 09/01/2019     Lab Results   Component Value Date    HGB 9.5 09/01/2019     Lab Results   Component Value Date    HCT 29.9 09/01/2019     Lab Results   Component Value Date     09/02/2019       Vitals:    Temp:  [36.3  C (97.3  F)-36.9  C (98.4  F)] 36.4  C (97.6  F)  Pulse:  [] 108  Heart Rate:  [57-76] 76  Resp:  [16-20] 16  BP: (117-165)/(62-84) 131/84  SpO2:  [94 %-99 %] 99 %    Exam:   Catheter site with dressing c/d/i, no tenderness, erythema, heme, edema      ASSESSMENT/PLAN:    Gabriela Watkins is a 79 year old female POD #3 s/p WEDGE RESECTION, LUNG, THORACOSCOPIC  THORACOTOMY and placement of T6-7 erector spinae catheter for analgesia.  Pt is receiving adequate analgesia with Ropivacaine 0.2% infusing at 14 mL/hour.  Pt is ambulating without difficulty.  No weakness or paresthesias, adequate sensory block.  No evidence of adverse side effects associated with local anesthetic.     - Continue current total infusion of  14mL/hour  - Will continue to follow up. Patient has chest tubes in place. Plan to remove catheter once chest tubes removed.   - Discussed plan with attending anesthesiologist    Adam Tolbert MD  Anesthesiology Resident, PGY-3   [facility]   [phone]  9/2/2019 9:08 AM

## 2019-09-02 NOTE — PROVIDER NOTIFICATION
Neuro: A&Ox3 . Forgetful . Was not comfortable at midnight for back. Shoulder and generalized  . Able to sleep and rest after oxycodone given.   Cardiac:  SB/SR  50s-70s no a-fib  During the night .  . VSS.one time iv hydralazine given for SBP>160. Afebrile .   Respiratory: Sating>95%  on RA.  GI/: Adequate urine output. No BM .   Diet/appetite: Tolerating  Regular diet.   Activity:  Assist of  1-2 to the commode . up to chair once   Pain: uncomfortable during the shift . Prn oxycodone given once with good effect ( patient reported that she slept well after oxycodone) .   Skin: No new deficits noted.  LDA's: left chest placed to -20 Sxn  At 8pm per  MD .               On Q block at 14mls/hr  Dressing C/D/I .                Left PIV saline locked .     Plan: Continue with POC. Notify primary team with changes.

## 2019-09-02 NOTE — PROGRESS NOTES
THORACIC & FOREGUT SURGERY    S: No acute events. Slept well. Pain controlled. Tolerating diet. Had BM x 2 yesterday.     O:  Vitals:    08/31/19 2301 09/01/19 0210 09/01/19 0321 09/01/19 0800   BP: 135/79  (!) 136/92 (!) 143/68   BP Location: Right arm  Right arm Right arm   Pulse: 68  97 67   Resp:   18 18   Temp: 98.3  F (36.8  C)  98.1  F (36.7  C) 98.3  F (36.8  C)   TempSrc: Oral  Oral Oral   SpO2: 97%  97% 98%   Weight:  68.3 kg (150 lb 9.2 oz)         GEN: NAD  CV: regular rate and rhythm  RESP: Nonlabored breathing on RA, L CT to -20, s/s, intermittent leak.  Incision c/d/i  ABD: soft, non-tender  Ext: wwp. No edema  PSYCH: cooperative      L CT to -20, 280 ml last 24 hrs; s/s output, intermittent air leak.      A/P: Gabriela Watkins is an 79 year old female with history of HTN, cholecystectomy secondary to ruptured gallbladder 6/2019 with post-op course c/b by CVA, now POD 3 s/p L VATS superior trisegmentectomy. 8/31 patient went into atrial fibrillation, HR 80-100s, now back in sinus.     - CT to -20, will follow up am CXR but plan to keep to suction  - tylenol, oxy PRN, on-Q per anesthesia, lidocaine patch  - regular diet, bowel regimen, replete lytes PRN  - Repeat EKG if arrhythmia. Will continue to hold PTA atenolol for now  - appreciate cards recs for new onset atrial fibrillation       Seen with Dr. Caba, Thoracic fellow, who will discuss with staff.    Katelin Powell MD  PGY-3, General Surgery  [phone]

## 2019-09-03 ENCOUNTER — APPOINTMENT (OUTPATIENT)
Dept: OCCUPATIONAL THERAPY | Facility: CLINIC | Age: 79
DRG: 164 | End: 2019-09-03
Attending: THORACIC SURGERY (CARDIOTHORACIC VASCULAR SURGERY)
Payer: COMMERCIAL

## 2019-09-03 ENCOUNTER — APPOINTMENT (OUTPATIENT)
Dept: GENERAL RADIOLOGY | Facility: CLINIC | Age: 79
DRG: 164 | End: 2019-09-03
Attending: THORACIC SURGERY (CARDIOTHORACIC VASCULAR SURGERY)
Payer: COMMERCIAL

## 2019-09-03 ENCOUNTER — PATIENT OUTREACH (OUTPATIENT)
Dept: CARE COORDINATION | Facility: CLINIC | Age: 79
End: 2019-09-03

## 2019-09-03 VITALS
WEIGHT: 147.71 LBS | BODY MASS INDEX: 30.08 KG/M2 | TEMPERATURE: 98.2 F | HEART RATE: 80 BPM | RESPIRATION RATE: 16 BRPM | OXYGEN SATURATION: 98 % | DIASTOLIC BLOOD PRESSURE: 65 MMHG | SYSTOLIC BLOOD PRESSURE: 140 MMHG

## 2019-09-03 LAB
ANION GAP SERPL CALCULATED.3IONS-SCNC: 12 MMOL/L (ref 3–14)
BASOPHILS # BLD AUTO: 0 10E9/L (ref 0–0.2)
BASOPHILS NFR BLD AUTO: 0.3 %
BLD PROD TYP BPU: NORMAL
BLD PROD TYP BPU: NORMAL
BLD UNIT ID BPU: 0
BLD UNIT ID BPU: 0
BLOOD PRODUCT CODE: NORMAL
BLOOD PRODUCT CODE: NORMAL
BPU ID: NORMAL
BPU ID: NORMAL
BUN SERPL-MCNC: 11 MG/DL (ref 7–30)
CALCIUM SERPL-MCNC: 8.7 MG/DL (ref 8.5–10.1)
CHLORIDE SERPL-SCNC: 103 MMOL/L (ref 94–109)
CO2 SERPL-SCNC: 19 MMOL/L (ref 20–32)
CREAT SERPL-MCNC: 0.83 MG/DL (ref 0.52–1.04)
DIFFERENTIAL METHOD BLD: ABNORMAL
EOSINOPHIL # BLD AUTO: 0.2 10E9/L (ref 0–0.7)
EOSINOPHIL NFR BLD AUTO: 1.3 %
ERYTHROCYTE [DISTWIDTH] IN BLOOD BY AUTOMATED COUNT: 14.1 % (ref 10–15)
GFR SERPL CREATININE-BSD FRML MDRD: 66 ML/MIN/{1.73_M2}
GLUCOSE SERPL-MCNC: 125 MG/DL (ref 70–99)
HCT VFR BLD AUTO: 33.2 % (ref 35–47)
HGB BLD-MCNC: 9.9 G/DL (ref 11.7–15.7)
IMM GRANULOCYTES # BLD: 0.1 10E9/L (ref 0–0.4)
IMM GRANULOCYTES NFR BLD: 0.4 %
INTERPRETATION ECG - MUSE: NORMAL
LACTATE BLD-SCNC: 0.9 MMOL/L (ref 0.7–2)
LYMPHOCYTES # BLD AUTO: 1.1 10E9/L (ref 0.8–5.3)
LYMPHOCYTES NFR BLD AUTO: 9.3 %
MAGNESIUM SERPL-MCNC: 1.8 MG/DL (ref 1.6–2.3)
MCH RBC QN AUTO: 28.9 PG (ref 26.5–33)
MCHC RBC AUTO-ENTMCNC: 29.8 G/DL (ref 31.5–36.5)
MCV RBC AUTO: 97 FL (ref 78–100)
MONOCYTES # BLD AUTO: 1.5 10E9/L (ref 0–1.3)
MONOCYTES NFR BLD AUTO: 12.8 %
NEUTROPHILS # BLD AUTO: 9 10E9/L (ref 1.6–8.3)
NEUTROPHILS NFR BLD AUTO: 75.9 %
NRBC # BLD AUTO: 0 10*3/UL
NRBC BLD AUTO-RTO: 0 /100
PHOSPHATE SERPL-MCNC: 2.3 MG/DL (ref 2.5–4.5)
PLATELET # BLD AUTO: 243 10E9/L (ref 150–450)
POTASSIUM SERPL-SCNC: 3.8 MMOL/L (ref 3.4–5.3)
RBC # BLD AUTO: 3.43 10E12/L (ref 3.8–5.2)
SODIUM SERPL-SCNC: 134 MMOL/L (ref 133–144)
TRANSFUSION STATUS PATIENT QL: NORMAL
WBC # BLD AUTO: 11.9 10E9/L (ref 4–11)

## 2019-09-03 PROCEDURE — 80048 BASIC METABOLIC PNL TOTAL CA: CPT | Performed by: THORACIC SURGERY (CARDIOTHORACIC VASCULAR SURGERY)

## 2019-09-03 PROCEDURE — 83735 ASSAY OF MAGNESIUM: CPT | Performed by: THORACIC SURGERY (CARDIOTHORACIC VASCULAR SURGERY)

## 2019-09-03 PROCEDURE — 71046 X-RAY EXAM CHEST 2 VIEWS: CPT

## 2019-09-03 PROCEDURE — 25000132 ZZH RX MED GY IP 250 OP 250 PS 637: Performed by: SURGERY

## 2019-09-03 PROCEDURE — 97530 THERAPEUTIC ACTIVITIES: CPT | Mod: GO

## 2019-09-03 PROCEDURE — 83735 ASSAY OF MAGNESIUM: CPT | Performed by: STUDENT IN AN ORGANIZED HEALTH CARE EDUCATION/TRAINING PROGRAM

## 2019-09-03 PROCEDURE — 40000802 ZZH SITE CHECK

## 2019-09-03 PROCEDURE — 25000132 ZZH RX MED GY IP 250 OP 250 PS 637: Performed by: STUDENT IN AN ORGANIZED HEALTH CARE EDUCATION/TRAINING PROGRAM

## 2019-09-03 PROCEDURE — 25800030 ZZH RX IP 258 OP 636: Performed by: STUDENT IN AN ORGANIZED HEALTH CARE EDUCATION/TRAINING PROGRAM

## 2019-09-03 PROCEDURE — 83605 ASSAY OF LACTIC ACID: CPT | Performed by: THORACIC SURGERY (CARDIOTHORACIC VASCULAR SURGERY)

## 2019-09-03 PROCEDURE — 25000125 ZZHC RX 250: Performed by: STUDENT IN AN ORGANIZED HEALTH CARE EDUCATION/TRAINING PROGRAM

## 2019-09-03 PROCEDURE — 84100 ASSAY OF PHOSPHORUS: CPT | Performed by: THORACIC SURGERY (CARDIOTHORACIC VASCULAR SURGERY)

## 2019-09-03 PROCEDURE — 97165 OT EVAL LOW COMPLEX 30 MIN: CPT | Mod: GO

## 2019-09-03 PROCEDURE — 99223 1ST HOSP IP/OBS HIGH 75: CPT | Mod: GC | Performed by: INTERNAL MEDICINE

## 2019-09-03 PROCEDURE — 97535 SELF CARE MNGMENT TRAINING: CPT | Mod: GO

## 2019-09-03 PROCEDURE — 40000141 ZZH STATISTIC PERIPHERAL IV START W/O US GUIDANCE

## 2019-09-03 PROCEDURE — 93010 ELECTROCARDIOGRAM REPORT: CPT | Performed by: INTERNAL MEDICINE

## 2019-09-03 PROCEDURE — 36415 COLL VENOUS BLD VENIPUNCTURE: CPT | Performed by: THORACIC SURGERY (CARDIOTHORACIC VASCULAR SURGERY)

## 2019-09-03 PROCEDURE — 85025 COMPLETE CBC W/AUTO DIFF WBC: CPT | Performed by: THORACIC SURGERY (CARDIOTHORACIC VASCULAR SURGERY)

## 2019-09-03 RX ORDER — ANALGESIC BALM 1.74; 4.06 G/29G; G/29G
OINTMENT TOPICAL EVERY 6 HOURS PRN
Status: DISCONTINUED | OUTPATIENT
Start: 2019-09-03 | End: 2019-09-03 | Stop reason: HOSPADM

## 2019-09-03 RX ORDER — AMLODIPINE BESYLATE 10 MG/1
10 TABLET ORAL DAILY
Qty: 30 TABLET | Refills: 3 | Status: SHIPPED | OUTPATIENT
Start: 2019-09-04 | End: 2019-09-25

## 2019-09-03 RX ORDER — AMOXICILLIN 250 MG
1-2 CAPSULE ORAL 2 TIMES DAILY
Qty: 40 TABLET | Refills: 0 | Status: SHIPPED | OUTPATIENT
Start: 2019-09-03

## 2019-09-03 RX ORDER — OXYCODONE HYDROCHLORIDE 5 MG/1
5 TABLET ORAL EVERY 4 HOURS PRN
Qty: 20 TABLET | Refills: 0 | Status: SHIPPED | OUTPATIENT
Start: 2019-09-03 | End: 2019-09-03

## 2019-09-03 RX ORDER — METOPROLOL TARTRATE 25 MG/1
12.5 TABLET, FILM COATED ORAL 2 TIMES DAILY
Qty: 60 TABLET | Refills: 3 | Status: SHIPPED | OUTPATIENT
Start: 2019-09-03 | End: 2019-09-04

## 2019-09-03 RX ORDER — METOPROLOL TARTRATE 1 MG/ML
2.5 INJECTION, SOLUTION INTRAVENOUS ONCE
Status: COMPLETED | OUTPATIENT
Start: 2019-09-03 | End: 2019-09-03

## 2019-09-03 RX ORDER — HYDROXYZINE HYDROCHLORIDE 25 MG/1
25 TABLET, FILM COATED ORAL EVERY 4 HOURS PRN
Status: DISCONTINUED | OUTPATIENT
Start: 2019-09-03 | End: 2019-09-03 | Stop reason: HOSPADM

## 2019-09-03 RX ORDER — OXYCODONE HYDROCHLORIDE 5 MG/1
5 TABLET ORAL EVERY 4 HOURS PRN
Qty: 20 TABLET | Refills: 0 | Status: SHIPPED | OUTPATIENT
Start: 2019-09-03 | End: 2021-01-01

## 2019-09-03 RX ADMIN — MENTHOL 1 PATCH: 205.5 PATCH TOPICAL at 01:23

## 2019-09-03 RX ADMIN — OXYCODONE HYDROCHLORIDE 5 MG: 5 TABLET ORAL at 01:24

## 2019-09-03 RX ADMIN — POTASSIUM CHLORIDE 20 MEQ: 750 TABLET, EXTENDED RELEASE ORAL at 02:57

## 2019-09-03 RX ADMIN — Medication 12.5 MG: at 03:14

## 2019-09-03 RX ADMIN — Medication 12.5 MG: at 09:08

## 2019-09-03 RX ADMIN — FUROSEMIDE 20 MG: 20 TABLET ORAL at 09:08

## 2019-09-03 RX ADMIN — Medication 2 G: at 02:57

## 2019-09-03 RX ADMIN — HYDROXYZINE HYDROCHLORIDE 25 MG: 25 TABLET ORAL at 02:33

## 2019-09-03 RX ADMIN — AMLODIPINE BESYLATE 10 MG: 10 TABLET ORAL at 09:07

## 2019-09-03 RX ADMIN — RANITIDINE 150 MG: 150 TABLET ORAL at 09:07

## 2019-09-03 RX ADMIN — METOPROLOL TARTRATE 2.5 MG: 5 INJECTION INTRAVENOUS at 03:15

## 2019-09-03 RX ADMIN — POTASSIUM PHOSPHATE, MONOBASIC AND POTASSIUM PHOSPHATE, DIBASIC 15 MMOL: 224; 236 INJECTION, SOLUTION INTRAVENOUS at 05:01

## 2019-09-03 ASSESSMENT — ACTIVITIES OF DAILY LIVING (ADL)
ADLS_ACUITY_SCORE: 14
ADLS_ACUITY_SCORE: 15
ADLS_ACUITY_SCORE: 14

## 2019-09-03 NOTE — PROGRESS NOTES
Care Coordinator - Discharge Planning    Admission Date/Time:  8/30/2019  Attending MD:  Bernard Rosado*     Data    Chart reviewed, discussed with interdisciplinary team.   Patient was admitted for:   1. Malignant neoplasm of lung, unspecified laterality, unspecified part of lung (H)         Assessment   Per Thoracic Surgery, Pt is medically stable for discharge today. I have met with Pt and Son to assist with discharge planning. Home care follow up discussed which Pt is agreeable to. Choice of Home Care agencies offered,  Home Care is preferred. I have made a referral to  Home Care and added Nursing and PT to the discharge orders.      Coordination of Care and Referrals: Referral made to [facility] for RN, PT      Plan  Anticipated Discharge Date:  9/3/19  Anticipated Discharge Plan:  discharge to home with Home Care follow up      Kay Bay RN   6B care coordinator #[phone]

## 2019-09-03 NOTE — DISCHARGE SUMMARY
NAME: Gabriela Watkins   MRN: [id number]   : 1940     DATE OF ADMISSION: 2019     PRE/POSTOPERATIVE DIAGNOSES: left upper lobe non-small cell carcinoma    PROCEDURES PERFORMED:   1. Flexible bronchoscopy  2. Uniportal thoracoscopic LEFT upper lobe trisegmentectomy (S1-3)   3. Mediastinal lymph node sampling    PATHOLOGY RESULTS: Pending at time of discharge     CULTURE RESULTS: None     INTRAOPERATIVE COMPLICATIONS: None     POSTOPERATIVE COMPLICATIONS: Atrial fibrillation    DRAINS/TUBES PRESENT AT DISCHARGE: incisions open to air    DATE OF DISCHARGE:  9/3/2019     HOSPITAL COURSE: Gabriela Watkins is a 79 year old female who on 2019  underwent the above-named procedures.  She tolerated the operation well and postoperatively was transferred to the general post-surgical unit.  The remainder of her course was notable for onset of atrial fibrillation on POD#2 and subsequent episode of asymptomatic a-fib with RVR up to 120s HR. Cardiology was consulted and she was started on scheduled coreg with good result. Patient's home dose of plavix was resumed at time of discharge. Prior to discharge, her pain was controlled well, she was able to perform ADLs and ambulate independently using a walker without difficulty, and had full return of bowel and bladder function. On 9/3/2019, she was discharged to home with her son in stable condition with home therapy in place.    DISCHARGE EXAM:   A&O, pleasant, NAD  Resp non-labored on room air  Distal extremities warm  Incisions c/d/i without erythema or rash     DISCHARGE INSTRUCTIONS:  Discharge Procedure Orders   X-ray Chest 2 vws*   Standing Status: Future Standing Exp. Date: 20     Order Specific Question Answer Comments   Priority Routine    Clinical Info for the Interpreting Provider s/p L superior trisegmentectomy      Home care nursing referral   Referral Priority: Routine Referral Type: Home Health Therapies & Aides   Number of Visits Requested: 1      Home Care PT Referral for Hospital Discharge   Referral Priority: Routine Referral Type: Home Health Therapies & Aides   Number of Visits Requested: 1     Neurology Adult Referral   Standing Status: Future Standing Exp. Date: 09/03/20     Cardiology Eval Adult Referral   Standing Status: Future Standing Exp. Date: 09/03/20     Cardiology Eval Adult Referral     MD face to face encounter   Order Comments: Documentation of Face to Face and Certification for Home Health Services    I certify that patient: Gabriela Watkins is under my care and that I, or a nurse practitioner or physician's assistant working with me, had a face-to-face encounter that meets the physician face-to-face encounter requirements with this patient on: 9/3/19    This encounter with the patient was in whole, or in part, for the following medical condition, which is the primary reason for home health care:  Lung cancer    I certify that, based on my findings, the following services are medically necessary home health services: RN, PT      Further, I certify that my clinical findings support that this patient is homebound,absences from home require considerable and taxing effort and are for medical reasons or [employer] services or infrequently or of short duration when for other reasons.    Based on the above findings. I certify that this patient is confined to the home and needs intermittent skilled nursing care, physical therapy and/or speech therapy.  The patient is under my care, and I have initiated the establishment of the plan of care.  This patient will be followed by a physician who will periodically review the plan of care.  Physician/Provider to provide follow up care: Kirsty Rainey    Attending hospital physician (the Medicare certified [facility] provider):   Physician Signature: See electronic signature associated with these discharge orders.  Date: 9/3/2019     Reason for your hospital stay   Order Comments: Left superior  "trisegmentectomy     Adult [facility] Follow-up and recommended labs and tests   Order Comments: THORACIC SURGERY DISCHARGE INSTRUCTIONS    DIET: Regular diet as tolerated    If your plans upon discharge include prolonged periods of sitting (i.e a lengthy car or plane ride), it is highly beneficial to get up and walk at least once per hour to help prevent swelling and blood clots.     You may remove chest tube dressing 48 hours after tube removal and bandage the site at your own discretion thereafter.  Small amounts of leakage are normal for 2-3 days after removal.  Feel free to call with questions.    You may get incision wet 2 days after operation. Do not submerge, soak, or scrub incision or swim until seen in follow-up.    Take incentive spirometer home for continued frequent use    Activity as tolerated, no strenous activity until seen in follow-up, no lifting greater than 20 pounds for the next 6 weeks.    Stay hydrated. Take over the counter fiber (metamucil or benefiber) and stool softeners (Miralax, docusate or senna) if becoming constipated.     Call for fever greater than 101.5, chills, increased size of incision, red skin around incision, vision changes, muscle strength changes, sensation changes, shortness of breath, or other concerns.    No driving while taking narcotic pain medication.    Transition to ibuprofen or tylenol/acetaminophen for pain control. Do not take tylenol/acetaminophen and acetaminophen containing narcotic (e.g., percocet or vicodin) at the same time. If you have known ulcer problems, or kidney trouble (elevated creatinine) do not take the ibuprofen.    In emergencies, call 911    For other Questions or Concerns;   A.) During weekday working hours (Monday through Friday 8am to 4:30pm)   call [phone] and ask to speak to a clinical nurse specialist.     B.) At nights (after 4:30pm), on weekends, or if urgent call [phone] and   tell the  \"I would like to page job " "code [phone], the thoracic surgery   fellow on call, please.\"      FOLLOW UP  1.) Follow up with primary care physician, Kirsty Rainey, in 1-2 weeks to determine risks/benefits of anticoagulation for new onset atrial fibrillation.  2.) Follow up with a thoracic surgery Clinical Nurse Specialist in Thoracic Surgery clinic in 1 month, prior to which a CXR should be performed.  3.) Follow up with Neurology  4.) Follow up with Cardiology for new onset atrial fibrillation     Zio Patch Holter Adult Pediatric Greater than 48 hrs   Standing Status: Future Standing Exp. Date: 10/18/19     Order Specific Question Answer Comments   Patient should wear the monitor for 14 days    Schedule hook-up appt at [facility] [12]        DISCHARGE MEDICATIONS:   Discharge Medication List as of 9/3/2019  5:25 PM      START taking these medications    Details   enoxaparin (LOVENOX) 40 MG/0.4ML syringe Inject 0.4 mLs (40 mg) Subcutaneous every 24 hours, Disp-4 Syringe, R-0, E-Prescribe      senna-docusate (SENOKOT-S/PERICOLACE) 8.6-50 MG tablet Take 1-2 tablets by mouth 2 times daily, Disp-40 tablet, R-0, E-Prescribe      metoprolol tartrate (LOPRESSOR) 25 MG tablet Take 0.5 tablets (12.5 mg) by mouth 2 times daily, Disp-60 tablet, R-3, Local Print         CONTINUE these medications which have CHANGED    Details   amLODIPine (NORVASC) 10 MG tablet Take 1 tablet (10 mg) by mouth daily, Disp-30 tablet, R-3, E-Prescribe      oxyCODONE (ROXICODONE) 5 MG tablet Take 1 tablet (5 mg) by mouth every 4 hours as needed for moderate to severe pain (pain control or improvement in physical function. Hold dose for analgesic side effects.), Disp-20 tablet, R-0, Local Print         CONTINUE these medications which have NOT CHANGED    Details   acetaminophen (TYLENOL) 500 MG tablet Take 1,000 mg by mouth as needed , Historical      atenolol (TENORMIN) 100 MG tablet TAKE 1 TABLET BY MOUTH EVERY DAY FOR BLOOD PRESSURE. PT TAKING EVERY MORNING, R-2, Historical "      atorvastatin (LIPITOR) 40 MG tablet TAKE 1 TABLET BY MOUTH EVERYDAY AT BEDTIME, R-1, Historical      fluticasone (FLONASE) 50 MCG/ACT nasal spray Spray 1 spray in nostril every morning , Historical      furosemide (LASIX) 20 MG tablet Take 20 mg by mouth every morning , Historical      Nutritional Supplements (ENSURE COMPLETE PO) Take 1 Can by mouth as needed, Historical      clopidogrel (PLAVIX) 75 MG tablet Take 75 mg by mouth every morning , R-1, Historical      UNABLE TO FIND Apply 1 g topically as needed MEDICATION NAME: MISHEL, Historical             Discharge summary completed by:    Katelin Powell MD  PGY-3, General Surgery  [phone]

## 2019-09-03 NOTE — PLAN OF CARE
Discharge Planner OT   Patient plan for discharge: Pt would like to return home with family assist.   Current status: Evaluation completed and treatment initiated. Therapist educated pt on OT role and discussed POC/Goals for therapy. Therapist educated pt on LUE VATS precautions and safety with transfers. Pt completed transfer supine<->seated EOB with CGA and vc's. Pt required CGA, vc's and use of IV pole for stability to ambulate to bathroom. Pt required CGA and vc's for completion of toilet transfer and cares. Pt completed ADLS at sink with setup, vc's and SBA. Pt ambulated ~140ft x2 with CGA, vc's and use of IV pole. VSS.    Barriers to return to prior living situation: Decreased strength/endurance, VATS precautions, Decreased functional mobility.   Recommendations for discharge: Home with use of FWW and assist from family with HH OT/PT.   Rationale for recommendations: Pt is moving well but would benefit from continued in home therapy to progress fully to baseline function.        Entered by: Samson Arcos 09/03/2019 4:43 PM

## 2019-09-03 NOTE — PLAN OF CARE
Cared for Gabriela from [id number]. Chest tube d/c'ed by MD this afternoon around 1600. No acute issues since. Vitals stable. Chest xray done this evening around 2000. Doing well, full assessments as documented, ON-Q bulb infusing at 14 ml/hr - RAPS team planning to discontinue tomorrow morning. Nursing to continue with plan of care, bed alarm on for safety as Gabriela is quite forgetful. Report given to oncoming RN who will now assume care.

## 2019-09-03 NOTE — PLAN OF CARE
[id number]:  Neuro: A&Ox4 . Forgetful.  Cardiac:  SR  60s, no afib since overnight at about 0250.                  Respiratory: Sating>95%  on RA.  GI/: Adequate urine output. BM x1, soft/brown.   Diet/appetite: Tolerating regular diet, appetite fair.   Activity:  Assist of 1 to the commode and chair. Ambulated in halls with PT, SBA.  Pain: Denies pain. On-Q removed by anesthesiology at 0900. Declines offers of pain meds.  Skin: No new deficits noted.  LDA's: R PIV SL'd.    Plan: Waiting for cardiology consult. MD weighing pros and cons of starting anticoagulation. Plan to consult with neurology outpatient.  Continue with POC. Notify primary team with changes.

## 2019-09-03 NOTE — PROGRESS NOTES
Cross-cover note: 2:53 AM 9/3/2019     09/03/2019    Thoracic Surgery Cross Cover Note    Called by nursing regarding conversion from NSR into afib w/RVR. Per nurse, pt has been in afib for likely 1.5 hrs. A repeat EKG was obtained which confirmed this. HRs are 110s-130s. BP 110s/50s. Pt denies SOB, CP, lightheadness or palpations. Of note, pt had episode of Afib on 8/31 w/ Hrs 80s-100s which converted back into NSR    B/P: 121/67, T: 97.5, P: 121, R: 18    PE:  A&O x3, NAD  Irregularly irregular, tachycardic  Breathing non-labored  Abd soft, non-tender, non-distended  Extr. Warm to touch, pain in RLE      Plan:  -discussed with cardiology fellow  -will give metoprolol 2.5 mg IV now  -will start metoprolol tartrate 12.5 mg PO BID, including now  -continue to monitor for hemodynamic instability  -may require long-term anticoagulation    Discussed with fellow    Julio Kimble MD  PGY-1, General Surgery

## 2019-09-03 NOTE — CONSULTS
CARDIOLOGY CONSULTATION    Name: Gabriela Watkins MRN: [id number]     Age: 79 year old    Date of admission: 8/30/2019 YOB: 1940       Consult indication: New onset of atrial fibrillation         HPI:   78 y/o female with PMHx significant for recently diagnosed left lung adenocarcinoma (T2N0M0), gangrenous cholecystitis s/p lap yu in 06/2019, left MUKUND ischemic CVA on 06/2019 and known 1.1cm MCA aneurysm was admitted to undergo VATS procedure as part of her lung malignancy work up.     Patient has a 50 pack year smoking history and as part of lung nodule surveillance was found to have a 4cm lesion in the left lung which was avid on PET CT. There was no evidence of distant metastasis. Mass was biopsied via mediastinoscopy and lymph node dissection. Pathology positive for adenocarcinoma. Following discussion of her biopsy, decision was made to admit patient for VATS trisegmentomy.     Her post op course was complicated by paroxysmal atrial fibrillation with RVR in the 120-130s, but otherwise normal vital signs. Labs for the most part have been unremarkable. Cardiology was consulted to weigh in on new onset of atrial fibrillation.          Past Medical History:     Past Medical History:   Diagnosis Date     CVA (cerebral vascular accident) (H)      HTN (hypertension)      Lung nodule      Malignant neoplasm of upper lobe of left lung (H)              Past Surgical History:      Past Surgical History:   Procedure Laterality Date     BLADDER SURGERY  1970     CATARACT IOL, RT/LT       CHOLECYSTECTOMY  06/03/2019     HYSTERECTOMY  1970     MEDIASTINOSCOPY  08/08/2019    lymph node biopsy     OOPHORECTOMY  1970     THORACOSCOPIC WEDGE RESECTION LUNG Left 8/30/2019    Procedure: Left Video Assisted Thoracoscopic Superior Trisegmentectomy;  Surgeon: Bernard Rosado MD;  Location:  OR             Social History:     Social History     Tobacco Use     Smoking status: Former Smoker     Types:  Cigarettes     Start date:      Last attempt to quit: 2001     Years since quittin.0     Smokeless tobacco: Never Used   Substance Use Topics     Alcohol use: Not Currently     Comment: nothing since 2019             Family History:     Family History   Problem Relation Age of Onset     Other - See Comments Father          from stroke during gallbladder surgery              Allergies:     Allergies   Allergen Reactions     Rosuvastatin      Simvastatin              Medications:   Prior to admission medications:  Medications Prior to Admission   Medication Sig Dispense Refill Last Dose     acetaminophen (TYLENOL) 500 MG tablet Take 1,000 mg by mouth as needed    Past Week at Unknown time     atenolol (TENORMIN) 100 MG tablet TAKE 1 TABLET BY MOUTH EVERY DAY FOR BLOOD PRESSURE. PT TAKING EVERY MORNING  2 2019 at 0430     atorvastatin (LIPITOR) 40 MG tablet TAKE 1 TABLET BY MOUTH EVERYDAY AT BEDTIME  1 2019 at Unknown time     fluticasone (FLONASE) 50 MCG/ACT nasal spray Spray 1 spray in nostril every morning    2019 at Unknown time     furosemide (LASIX) 20 MG tablet Take 20 mg by mouth every morning    2019 at 0900     Nutritional Supplements (ENSURE COMPLETE PO) Take 1 Can by mouth as needed   2019 at 1700     clopidogrel (PLAVIX) 75 MG tablet Take 75 mg by mouth every morning   1 2019     UNABLE TO FIND Apply 1 g topically as needed MEDICATION NAME: THERAWORKS        [DISCONTINUED] amLODIPine (NORVASC) 5 MG tablet TAKE 1 TABLET BY MOUTH ONCE A DAY FOR BLOOD PRESSURE. PT TAKING EVERY MORNING  1 2019 at 0430        Current medications:  Current Facility-Administered Medications   Medication     amLODIPine (NORVASC) tablet 10 mg     atorvastatin (LIPITOR) tablet 40 mg     enoxaparin (LOVENOX) injection 40 mg     ranitidine (ZANTAC) tablet 150 mg    Or     famotidine (PEPCID) infusion 20 mg     fluticasone (FLONASE) 50 MCG/ACT spray 1 spray     furosemide (LASIX)  tablet 20 mg     hydrALAZINE (APRESOLINE) injection 10 mg     hydrOXYzine (ATARAX) tablet 25 mg     Lidocaine (LIDOCARE) 4 % Patch 1 patch     lidocaine (LMX4) cream     lidocaine 1 % 0.1-1 mL     lidocaine patch in PLACE     lidocaine patch REMOVAL     magnesium sulfate 2 g in NS intermittent infusion ([facility] or [facility] C[facility]d)     magnesium sulfate 4 g in 100 mL sterile water (premade)     Medication Instruction: Keep IV Fluids at TKO if using PCA     menthol (ICY HOT) 5 % patch 1 patch    And     menthol (ICY HOT) Patch in Place    And     menthol (ICY HOT) patch REMOVAL     methyl salicylate-menthol (VASQUEZ-ABRAMS) ointment     metoprolol tartrate (LOPRESSOR) half-tab 12.5 mg     naloxone (NARCAN) injection 0.1-0.4 mg     ondansetron (ZOFRAN) injection 4 mg    Or     ondansetron (ZOFRAN-ODT) ODT tab 4 mg     oxyCODONE (ROXICODONE) tablet 5 mg     potassium chloride (KLOR-CON) Packet 20-40 mEq     potassium chloride 10 mEq in 100 mL intermittent infusion with 10 mg lidocaine     potassium chloride 10 mEq in 100 mL sterile water intermittent infusion (premix)     potassium chloride 20 mEq in 50 mL intermittent infusion     potassium chloride ER (K-DUR/KLOR-CON M) CR tablet 20-40 mEq     potassium phosphate 10 mmol in D5W 250 mL intermittent infusion     potassium phosphate 15 mmol in D5W 250 mL intermittent infusion     potassium phosphate 20 mmol in D5W 250 mL intermittent infusion     potassium phosphate 20 mmol in D5W 500 mL intermittent infusion     potassium phosphate 25 mmol in D5W 500 mL intermittent infusion     senna-docusate (SENOKOT-S/PERICOLACE) 8.6-50 MG per tablet 1 tablet    Or     senna-docusate (SENOKOT-S/PERICOLACE) 8.6-50 MG per tablet 2 tablet     sodium chloride (PF) 0.9% PF flush 3 mL     sodium chloride (PF) 0.9% PF flush 3 mL            Review of Systems:   12-point review of systems was negative except as mentioned in the HPI.          Exam:   BP (!) 140/65 (BP Location: Right arm)   Pulse 80    Temp 98.2  F (36.8  C) (Oral)   Resp 16   Wt 67 kg (147 lb 11.3 oz)   SpO2 98%   BMI 30.08 kg/m      GEN: AAOX3, NAD  HEENT: PERRL, no scleral icterus, mucus membranes moist  NECK: Supple, JVP not elevated   RESP: CTA bilaterally, no wheezing, no crackles  CV: Regular, normal rate, 2/6 systolic murmur in aortic precordium, no S3, S4, M/G/R   ABD: Soft, nontender to palpation, +BS, no guarding  EXT: +1 peripheral edema, warm/well perfused   NEURO: CN II-XII intact, normal 5/5 strength in all extremitites  SKIN: Normal skin turgor, no rash on limited exam         Data:   Labs:  CMP  Recent Labs   Lab 09/03/19  0151 09/02/19  0538 09/01/19  0546 08/31/19  2200 08/31/19  0445    135 134 132* 134   POTASSIUM 3.8 3.6 4.3 3.7 4.0   CHLORIDE 103 104 105 101 104   CO2 19* 22 21 24 24   ANIONGAP 12 9 8 7 7   * 90 126* 123* 119*   BUN 11 12 13 16 19   CR 0.83 0.79 0.82 0.96 1.00   GFRESTIMATED 66 70 67 56* 53*   GFRESTBLACK 77 82 78 64 61   CARLOS 8.7 8.2* 8.1* 8.2* 8.2*   MAG 1.8 2.1  --  2.2 2.8*   PHOS 2.3* 2.4* 3.3 2.3*  --      CBC  Recent Labs   Lab 09/03/19  0151 09/02/19  0538 09/01/19  0622 08/30/19  1355 08/30/19  0638   WBC 11.9*  --  8.0  --   --    RBC 3.43*  --  3.18*  --   --    HGB 9.9*  --  9.5*  --  11.5*   HCT 33.2*  --  29.9*  --   --    MCV 97  --  94  --   --    MCH 28.9  --  29.9  --   --    MCHC 29.8*  --  31.8  --   --    RDW 14.1  --  14.1  --   --     182 187 236  --      INRNo lab results found in last 7 days.  Lab Results   Component Value Date    TROPI <0.015 09/02/2019    TROPI <0.015 09/01/2019    TROPI <0.015 08/31/2019     No results for input(s): CHOL, HDL, LDL, TRIG, CHOLHDLRATIO in the last 52763 hours.         Imaging:       Recent Results (from the past 24 hour(s))   XR Chest 2 Views    Narrative    Exam: XR CHEST 2 VW, 9/2/2019 8:37 PM    Indication: post chest tube pull    Comparison: Same day 1452    Findings:   PA and lateral views of the chest. Left-sided chest  tube is been  removed. Cardiac silhouette is unchanged. Streaky perihilar opacities  similar to prior. Relatively unchanged small left pneumothorax. No  right pneumothorax. No large pleural effusion. Visualized upper  abdomen is unremarkable. No acute osseous abnormality.      Impression    Impression: Relatively unchanged small left pneumothorax status post  chest tube removal.    I have personally reviewed the examination and initial interpretation  and I agree with the findings.    TUCKER ALEXANDRE MD   XR Chest 2 Views    Narrative    XR CHEST 2 VW  9/3/2019 8:30 AM      HISTORY: lung resection    COMPARISON: 9/2/2019    FINDINGS:   PA and lateral radiographs of the chest. The cardiac silhouette is  similar in size. Unchanged aortic calcifications. Lung volumes are  similar with mildly decreased streaky perihilar opacities. Decreased  small left apical pneumothorax. Trace left pleural effusion.      Impression    IMPRESSION:   1. Decreased small left apical pneumothorax.  2. Mildly decreased streaky perihilar opacities.  3. Trace left pleural effusion.    I have personally reviewed the examination and initial interpretation  and I agree with the findings.    SIMON CHAO MD       Echocardiogram:  06/2019 on [facility] Everywhere   Normal left ventricular size.The calculated left ventricular ejection   fraction is 60%. This represents a normal ejection fraction. Mild   hypertrophy noted. Left ventricular diastolic dysfunction consistent with   pseudonormalization. No thrombus. No mass.    Left atrial volume is mildly increased.    Normal right ventricular size and systolic function.    No hemodynamically significant valvular heart abnormalities.    No previous study for comparison.             Assessment and Recomendations:     Assessment and Plan:  80 y/o female with PMHx significant for recently diagnosed left lung adenocarcinoma (T2N0M0), gangrenous cholecystitis s/p lap yu in 06/2019, left MUKUND ischemic CVA  on 06/2019 and known 1.1cm MCA aneurysm was admitted to undergo VATS trisegmentomy; consulted to cardiology because of post-op atrial fibrillation.     #Atrial fibrillation (CHADVASC 6)  #Hx of recent left MUKUND CVA  #Known right MCA 1.1cm aneurysm  Patient with post op atrial fibrillation. Rates for the most part well controlled without medications, but at times in the 130s. After tele check, most of the time she is in sinus rhythm. She is tolerating bb well. Echocardiogram from OSH did not show any structural lesions or thrombus. This echo was obtained in 06/2019 when patient developed a left MUKUND ischemic CVA. There was no mention of bubble study. US of the carotids showed 50% stenosis bilaterally. Patient is asymptomatic and therefore it is conceivable that she has had paroxysmal atrial fibrillation for some time especially since having pulmonary disease. During her admission for the CVA, the notes do not indicate atrial fibrillation. Nevertheless, patient has CHADVASC of 6 placing her at a 10% risk of CVA. At the same time there was a finding on her CT angio of a right MCA aneurysm measuring 1.1cm which warrants discussion of risk of anticoagulation.   - Ok to discharge when surgically cleared  - Zio patch for afib burden (ordered for you)  - Outpatient echocardiogram with bubble study  - Neurology consulted, might need neuro outpatient follow up  - Cardiology EP outpatient follow up (ordered for you)    #HTN  - Continue current meds    #HLD  - Continue statin    Patient evaluated and discussed with Dr. Orlando.       Eddi Alvarenga MD PhD  Cardiology Fellow  Pager: [phone]  September 3, 2019

## 2019-09-03 NOTE — PLAN OF CARE
Afebrile. O2 sats wdl on RA.   BP: HTN at start of shift. PRN hydralazine admin per order with reduction of 164/65 (98) to 133/49(88). BP's 110-120/60-70's majority of shift with exception of one aberrant pressure 89/61 during transient afib period (see below).   HR: 70-90's most of shift. Pt converted into afib with RVR with -120's. See MD notification. HR dejah 57-60's post conversion back to SR.  Endorsed pain in neck and bilateral thighs/knees. Aqua k pad placed on legs, icy hot patch place on neck and 5 mg oxycodone admin. On Q pump patent, dressing CDI. Chest tube site dressing and anterior cervical site CDI. Place to removed today. Atarax order received and admin for observable anxiousness/restlessness.   A+O x4, forgetful, redirectable and able to follow commands. Bed alarm on.  Am lab levels reviewed; K+ 3.8, mag 1.8 and phos 2.3- all replaced per established protocol.  Up with assist of 1 to [facility] [place] [facility]. Void spontaneous. No stool. + flatus.   Able to make needs known. Rested 2nd half of shift.   Cards to consult. Continue to monitor. Continue POC.

## 2019-09-03 NOTE — PROGRESS NOTES
REGIONAL ANESTHESIA PAIN SERVICE CONTINUOUS NERVE INFUSION NOTE  Gabriela Watkins is a 79 year old female POD #4 s/p WEDGE RESECTION, LUNG, THORACOSCOPIC  THORACOTOMY and placement of Left T5-6 erector spinae (ES) catheter for pain control.    Subjective and Interval History: Overnight afib with RVR converted with metoprolol IV, being followed by cardiology.  Patient's L) CT removed yesterday, denies incisional pain with nerve block continuous infusion and current analgesics (see below).   Reports pain bilateral thighs unchanged from baseline PTA, denies weakness, paresthesias, circumoral numbness, metallic taste or tinnitus. Tolerating a regular diet, denies nausea/vomiting.    Clinically Aligned Pain Assessment (CAPA):   Comfort (How is your pain?): Negligible pain  Change in Pain (Since your last medication/intervention?): About the same  Pain Control (How are your pain treatments working?):  Fully effective pain control  Functioning (Are you able to do activities to get better?) : Can do everything I need to do      Antithrombotic/Thrombolytic Therapy ordered:    enoxaparin (LOVENOX) injection 40 mg, SC, Q24H  Last dose given yesterday at 2022         Analgesic Medications:   Medications related to Pain Management (From now, onward)    Start     Dose/Rate Route Frequency Ordered Stop    09/03/19 0151  hydrOXYzine (ATARAX) tablet 25 mg      25 mg Oral EVERY 4 HOURS PRN 09/03/19 0152      08/31/19 0800  Lidocaine (LIDOCARE) 4 % Patch 1 patch      1 patch  over 12 Hours Transdermal EVERY 24 HOURS 0800 08/30/19 1244      08/30/19 2000  senna-docusate (SENOKOT-S/PERICOLACE) 8.6-50 MG per tablet 1 tablet      1 tablet Oral 2 TIMES DAILY 08/30/19 1244      08/30/19 2000  senna-docusate (SENOKOT-S/PERICOLACE) 8.6-50 MG per tablet 2 tablet      2 tablet Oral 2 TIMES DAILY 08/30/19 1244      08/30/19 1245  lidocaine patch in PLACE       Transdermal EVERY 8 HOURS 08/30/19 1244      08/30/19 1244  lidocaine 1 % 0.1-1 mL       0.1-1 mL Other EVERY 1 HOUR PRN 08/30/19 1244      08/30/19 1244  lidocaine (LMX4) cream       Topical EVERY 1 HOUR PRN 08/30/19 1244      08/30/19 1244  oxyCODONE (ROXICODONE) tablet 5 mg      5 mg Oral EVERY 4 HOURS PRN 08/30/19 1244      08/30/19 0800  ropivacaine 0.2% (NAROPIN) 750 mL in ON-Q C-Bloc select flow ([id number] holds 600-750 mL) single cath disposable pump      14 mL/hr  Irrigation Continuous Nerve Block 08/30/19 0748             Objective:  Lab results  Recent Labs   Lab Test 09/03/19  0151   WBC 11.9*   RBC 3.43*   HGB 9.9*   HCT 33.2*   MCV 97   MCH 28.9   MCHC 29.8*   RDW 14.1          No results found for: INR      Vitals:    Temp:  [97.5  F (36.4  C)-98.4  F (36.9  C)] 98.3  F (36.8  C)  Pulse:  [105-121] 121  Heart Rate:  [] 80  Resp:  [16-22] 16  BP: ()/(49-76) 131/76  SpO2:  [96 %-100 %] 96 %    Exam:   GEN: alert and no distress  NEURO/MSK: Strength BLE 5/5  and overall symmetric  SKIN: left erector spinae (ES) catheter site with dressing c/d/i, no tenderness, erythema, heme, edema      Assessment and Plan:     Patient is receiving adequate analgesia with current multimodal therapy including left nerve block infusion of ropivacaine 0.2% at 14 mL/hr.  Motor function intact and adequate sensory block, is meeting activity goals.  No evidence of adverse side effects related to local anesthetic. L) CT removed yesterday so plan to remove nerve block catheter today.    - antithrombotic/thrombolytic therapy last dose Enoxaparin (Lovenox) SQ given yesterday at 2022  -0900 left nerve block catheter removed without complication, dark tip intact.small bandage applied    - will sign off    - discussed plan with attending anesthesiologist    SONI Morris [facility]  Regional Anesthesia Pain Service  9/3/2019 8:58 AM    RAPS Contact Info (24 hour job code pager is the last 4 digits) For in-house use only:   [employer] phone: [place] [phone], [place] [phone], Peds [phone], then  enter call-back number.    Text: Use AMCOM on the Intranet <Paging/Directory> tab and enter Jobcode ID.   If no call back at any time, contact the hospital  and ask for RAPS attending or backup

## 2019-09-03 NOTE — DISCHARGE SUMMARY
DISCHARGE                         9/3/19  ----------------------------------------------------------------------------  Discharged to: Home  Via: private transportation  Accompanied by: Family  Discharge Instructions: Regular diet,  20lb weight restriction and activity, medications, follow up appointments, when to call the MD, aftercare instructions.  Prescriptions: To be filled by Discharge pharmacy; medication list reviewed & sent with pt  Follow Up Appointments: arranged; information given  Belongings: All sent with pt  IV: d/c'd  Telemetry: d/c'd  Pt exhibits understanding of above discharge instructions; all questions answered.    Discharge Paperwork: Signed, copied, and sent home with patient.

## 2019-09-03 NOTE — PROGRESS NOTES
09/03/19 1100   Quick Adds   Type of Visit Initial Occupational Therapy Evaluation   Living Environment   Lives With child(nando), adult   Living Arrangements house   Home Accessibility stairs to enter home   Number of Stairs, Main Entrance 4   Stair Railings, Main Entrance railing on left side (ascending)   Transportation Anticipated family or friend will provide   Self-Care   Usual Activity Tolerance good   Current Activity Tolerance fair   Regular Exercise No   Equipment Currently Used at Home grab bar, tub/shower   Functional Level   Ambulation 0-->independent   Transferring 0-->independent   Toileting 0-->independent   Bathing 0-->independent   Dressing 0-->independent   Eating 0-->independent   Communication 0-->understands/communicates without difficulty   Swallowing 0-->swallows foods/liquids without difficulty   Cognition 0 - no cognition issues reported   Fall history within last six months yes   Number of times patient has fallen within last six months 1   Which of the above functional risks had a recent onset or change? ambulation;transferring;dressing;cognition   General Information   Onset of Illness/Injury or Date of Surgery - Date 08/30/19   Referring Physician Marily Liang MD   Patient/Family Goals Statement Pt would like to get out of the hospital and return home.   Additional Occupational Profile Info/Pertinent History of Current Problem Gabriela Watkins is an 79 year old female with history of HTN, cholecystectomy secondary to ruptured gallbladder 6/2019 with post-op course c/b by CVA, now POD 3 s/p L VATS superior trisegmentectomy. 8/31 patient went into atrial fibrillation, HR 80-100s, now back in sinus.   Precautions/Limitations fall precautions;other (see comments)  (left VATS precautions. )   Weight-Bearing Status - LUE other (see comments)  (VATS precautions.)   Weight-Bearing Status - RUE full weight-bearing   Weight-Bearing Status - LLE full weight-bearing   Weight-Bearing  "Status - RLE full weight-bearing   Visual Perception   Visual Perception No deficits were identified;Wears glasses   Sensory Examination   Sensory Quick Adds No deficits were identified   Pain Assessment   Patient Currently in Pain No   Integumentary/Edema   Integumentary/Edema no deficits were identifed   Range of Motion (ROM)   ROM Comment RUE AROM WFL. LUE AROM 0-~160 humeral flexion.    Strength   Strength Comments Pt demonstrates  strength WFL.    Mobility   Bed Mobility Comments CGA and vc's.    Transfer Skills   Transfer Comments CGA and vc's with IV pole for stability.    Activities of Daily Living Analysis   Impairments Contributing to Impaired Activities of Daily Living flexibility decreased;post surgical precautions;ROM decreased;strength decreased   General Therapy Interventions   Planned Therapy Interventions ADL retraining;IADL retraining;bed mobility training;cognition;ROM;strengthening;stretching;transfer training;home program guidelines;progressive activity/exercise   Clinical Impression   Criteria for Skilled Therapeutic Interventions Met yes, treatment indicated   OT Diagnosis Decreased independence with functional transfers and ADLs   Influenced by the following impairments Decreased strengthening and endurance, Fatigue, Decreased functional mobility.    Assessment of Occupational Performance 1-3 Performance Deficits   Identified Performance Deficits Decreased independence with functional transfers/ADLs.    Clinical Decision Making (Complexity) Low complexity   Therapy Frequency Daily   Predicted Duration of Therapy Intervention (days/wks) 9/10/2019   Anticipated Discharge Disposition Home with Assist;Home with Home Therapy   Risks and Benefits of Treatment have been explained. Yes   Patient, Family & other staff in agreement with plan of care Yes   [facility] AM-PAC TM \"6 Clicks\"   2016, Trustees of [facility], under license to [employer].  All rights reserved.   6 Clicks " "Short Forms Daily Activity Inpatient Short Form   [facility] AM-PAC  \"6 Clicks\" Daily Activity Inpatient Short Form   1. Putting on and taking off regular lower body clothing? 3 - A Little   2. Bathing (including washing, rinsing, drying)? 3 - A Little   3. Toileting, which includes using toilet, bedpan or urinal? 4 - None   4. Putting on and taking off regular upper body clothing? 3 - A Little   5. Taking care of personal grooming such as brushing teeth? 4 - None   6. Eating meals? 4 - None   Daily Activity Raw Score (Score out of 24.Lower scores equate to lower levels of function) 21   Total Evaluation Time   Total Evaluation Time (Minutes) 10     "

## 2019-09-03 NOTE — PROVIDER NOTIFICATION
surg cross cover notified re: increased -120's with possible conversion back into afib. Increased pain to knees, neck. Pain meds, icy hot patch and heat application started. Lab called to collect am labs. Request/obtained 12 lead ekg order. Pt with hx of increased anxiety, atarax order rec'd. Pt endorsed feeling like choking on secretions, able to clear. Sputum clear with blood tinge. MD to come assess

## 2019-09-03 NOTE — PLAN OF CARE
6B:PT - Hold: PT consult received and appreciated. Per chart review and interdisciplinary team discussion anticipate single discipline needs. OT following, PT to hold, will initiate if warranted.

## 2019-09-03 NOTE — PROGRESS NOTES
REGIONAL ANESTHESIA PAIN SERVICE NOTE     Followed up on patient with Left T5-T6 erector spinae catheter for post operative pain management. She is POD #3 s/p L VATS superior trisegmentectomy.      Interval History:   Patient denied pain at thoracic cage. 0/10 at rest or with activity.   She did had some lower extremity cramping type of pain which resolved after taking a pill.         OBJECTIVE:     Diagnostic:        Lab Results   Component Value Date     WBC 8.0 09/01/2019            Lab Results   Component Value Date     RBC 3.18 09/01/2019            Lab Results   Component Value Date     HGB 9.5 09/01/2019            Lab Results   Component Value Date     HCT 29.9 09/01/2019            Lab Results   Component Value Date      09/02/2019         Vitals:      Temp:  [36.3  C (97.3  F)-36.9  C (98.4  F)] 36.4  C (97.6  F)  Pulse:  [] 108  Heart Rate:  [57-76] 76  Resp:  [16-20] 16  BP: (117-165)/(62-84) 131/84  SpO2:  [94 %-99 %] 99 %     Exam:   Catheter site with dressing c/d/i, no tenderness, erythema, heme, edema        ASSESSMENT/PLAN:    Gabriela Watkins is a 79 year old female POD #3 s/p WEDGE RESECTION, LUNG, THORACOSCOPIC  THORACOTOMY and placement of T6-7 erector spinae catheter for analgesia.  Pt is receiving adequate analgesia with Ropivacaine 0.2% infusing at 14 mL/hour.  Pt is ambulating without difficulty.  No weakness or paresthesias, adequate sensory block.  No evidence of adverse side effects associated with local anesthetic.      - Continue current total infusion of  14mL/hour  - Will continue to follow up. Patient has chest tubes in place. Plan to remove catheter once chest tubes removed.     Roly Jacques MD   September 2, 2019 8:33 PM

## 2019-09-04 ENCOUNTER — HOME CARE/HOSPICE - HEALTHEAST (OUTPATIENT)
Dept: HOME HEALTH SERVICES | Facility: HOME HEALTH | Age: 79
End: 2019-09-04

## 2019-09-04 RX ORDER — METOPROLOL TARTRATE 25 MG/1
12.5 TABLET, FILM COATED ORAL 2 TIMES DAILY
Qty: 60 TABLET | Refills: 3 | Status: SHIPPED | OUTPATIENT
Start: 2019-09-04 | End: 2021-01-01

## 2019-09-04 NOTE — PLAN OF CARE
Occupational Therapy Discharge Summary    Reason for therapy discharge:    Discharged to home with home therapy.    Progress towards therapy goal(s). See goals on Care Plan in [facility] electronic health record for goal details.  Goals partially met.  Barriers to achieving goals:   discharge on same date as initial evaluation.    Therapy recommendation(s):    Discharge home with A, use of FWW  at all times and HH OT/PT.

## 2019-09-04 NOTE — PROGRESS NOTES
[facility]: Post-Discharge Note  SITUATION                                                      Admission:    Admission Date: 08/30/19   Reason for Admission: left upper lobe non-small cell carcinoma  Discharge:   Discharge Date: 09/03/19  Discharge Diagnosis: left upper lobe non-small cell carcinoma  Discharge Service: Thoracic surgery     BACKGROUND                                                      Gabriela Watkins is a 79 year old female who on 8/30/2019  underwent the above-named procedures.  She tolerated the operation well and postoperatively was transferred to the general post-surgical unit.  The remainder of her course was notable for onset of atrial fibrillation on POD#2 and subsequent episode of asymptomatic a-fib with RVR up to 120s HR. Cardiology was consulted and she was started on scheduled coreg with good result. Patient's home dose of plavix was resumed at time of discharge. Prior to discharge, her pain was controlled well, she was able to perform ADLs and ambulate independently using a walker without difficulty, and had full return of bowel and bladder function. On 9/3/2019, she was discharged to home with her son in stable condition with home therapy in place.    ASSESSMENT      Discharge Assessment  Patient reports symptoms are: Unchanged  Does the patient have all of their medications?: Yes  Does patient know what their new medications are for?: Yes  Does patient have a follow-up appointment scheduled?: No  Does patient have any other questions or concerns?: No    Post-op  Did the patient have surgery or a procedure: Yes  Incision: healing  Drainage: No  Bleeding: none  Fever: No  Chills: No  Redness: No  Warmth: No  Swelling: No  Incision site pain: No  Eating & Drinking: eating and drinking without complaints/concerns  PO Intake: regular diet  Bowel Function: loose stools(Daughter thinks this is related to the stool softener)  Date of last BM: 09/04/19  Urinary Status:  voiding without complaint/concerns    PLAN                                                      Outpatient Plan:      1.) Follow up with primary care physician, Kirsty Rainey, in 1-2 weeks to determine risks/benefits of anticoagulation for new onset atrial fibrillation.  2.) Follow up with a thoracic surgery Clinical Nurse Specialist in Thoracic Surgery clinic in 1 month, prior to which a CXR should be performed.  3.) Follow up with Neurology  4.) Follow up with Cardiology for new onset atrial fibrillation    No future appointments.        Joanne Alexandre, CMA

## 2019-09-05 ENCOUNTER — HOME CARE/HOSPICE - HEALTHEAST (OUTPATIENT)
Dept: HOME HEALTH SERVICES | Facility: HOME HEALTH | Age: 79
End: 2019-09-05

## 2019-09-06 LAB — COPATH REPORT: NORMAL

## 2019-09-10 ENCOUNTER — COMMUNICATION - HEALTHEAST (OUTPATIENT)
Dept: ONCOLOGY | Facility: CLINIC | Age: 79
End: 2019-09-10

## 2019-09-13 ENCOUNTER — RECORDS - HEALTHEAST (OUTPATIENT)
Dept: ADMINISTRATIVE | Facility: OTHER | Age: 79
End: 2019-09-13

## 2019-09-13 LAB
COPATH REPORT: NORMAL
COPATH REPORT: NORMAL

## 2019-09-22 ASSESSMENT — ENCOUNTER SYMPTOMS
BRUISES/BLEEDS EASILY: 1
BACK PAIN: 1

## 2019-09-25 ENCOUNTER — ANCILLARY PROCEDURE (OUTPATIENT)
Dept: GENERAL RADIOLOGY | Facility: CLINIC | Age: 79
End: 2019-09-25
Payer: COMMERCIAL

## 2019-09-25 ENCOUNTER — OFFICE VISIT (OUTPATIENT)
Dept: SURGERY | Facility: CLINIC | Age: 79
End: 2019-09-25
Attending: CLINICAL NURSE SPECIALIST
Payer: COMMERCIAL

## 2019-09-25 ENCOUNTER — RECORDS - HEALTHEAST (OUTPATIENT)
Dept: RADIOLOGY | Facility: CLINIC | Age: 79
End: 2019-09-25

## 2019-09-25 VITALS
TEMPERATURE: 97 F | BODY MASS INDEX: 29.47 KG/M2 | OXYGEN SATURATION: 98 % | WEIGHT: 144.7 LBS | HEART RATE: 56 BPM | SYSTOLIC BLOOD PRESSURE: 133 MMHG | DIASTOLIC BLOOD PRESSURE: 64 MMHG

## 2019-09-25 DIAGNOSIS — C34.90 MALIGNANT NEOPLASM OF LUNG, UNSPECIFIED LATERALITY, UNSPECIFIED PART OF LUNG (H): ICD-10-CM

## 2019-09-25 DIAGNOSIS — C34.12 MALIGNANT NEOPLASM OF UPPER LOBE OF LEFT LUNG (H): Primary | ICD-10-CM

## 2019-09-25 PROCEDURE — G0463 HOSPITAL OUTPT CLINIC VISIT: HCPCS | Mod: ZF

## 2019-09-25 ASSESSMENT — PAIN SCALES - GENERAL: PAINLEVEL: NO PAIN (0)

## 2019-09-25 NOTE — LETTER
9/25/2019       RE: Gabriela Watkins  [address] [place] [address]     Dear Colleague,    Thank you for referring your patient, Gabriela Watkins, to the [facility]. Please see a copy of my visit note below.    THORACIC SURGERY FOLLOW UP VISIT    Dear Ms. Kirsty Rainey PA-C,    I saw Ms. Watkins in follow-up today. The clinical summary follows:     PREOP DIAGNOSIS   Left upper lobe non-small cell carcinoma  PROCEDURE   1. Flexible bronchoscopy  2. Uniportal thoracoscopic LEFT upper lobe trisegmentectomy (S1-3)   3. Mediastinal lymph node sampling    DATE OF PROCEDURE  8/30/19    HISTOPATHOLOGY   FINAL DIAGNOSIS:   A. LYMPH NODES, 11L, EXCISION:   - Four lymph nodes, negative for tumor (0/4).     B. LYMPH NODE, 12L, EXCISION:   - Fragments of lymph node, negative for tumor (0/1).     C. LUNG, LEFT UPPER LOBE, TRISEGMENTECTOMY:   - INVASIVE MUCINOUS ADENOCARCINOMA, moderately differentiated with focal   areas of papillary and micropapillary   growth pattern, 5.2 cm in greatest dimension.   - Tumor involves the elastica interna, but not into elastica externa of   visceral pleura.   - Margins are negative for tumor; bronchovascular margin is at 2.7 cm from    tumor.   - No lymphovascular invasion identified.   - Adjacent lung parenchyma with emphysematous change and focal area of   lipoid pneumonia.   - Seven hilar lymph nodes, negative for tumor (0/7).   - The AJCC pathologic staging is pT3 N0.   - See synoptic report.     D. LYMPH NODES, LEVEL 5, EXCISION:   - Two lymph nodes, negative for tumor (0/2).     COMPLICATIONS  None    INTERVAL STUDIES  CXR today:  IMPRESSION:   1. Resolution of previous left apical pneumothorax.  2. Reduced left pleural effusion and decreased elevation of left  hemidiaphragm. Hazy opacity over the left hemidiaphragm may represent  scarring and/or atelectasis  3. Bibasilar atelectasis.  4. Right upper lung nodule similar in appearance compared to CT  from  8/23/2019.    CHARAN Flores is here today with her daughter, Mary Kay.   She denies any new concerns.     From a personal perspective, she lives with her daughter.    IMPRESSION   Ms Watkins is here for a post-operative evaluation today.  She reports doing well, overall, but does get fatigued easily.   She and her daughter said she often sleeps after a meal for 30-60 minutes 2-3 times a day.   She sleeps well at night, complains of minimal discomfort and takes an occasional acetaminophen.       Her incision (uniportal) is well-healed with no erythema or drainage.   Her lung sound are clear to ausculation.   We reviewed her CXR while in clinic, no evidence of fluid or pneumothorax present.       Because her lung cancer was staged at pT3N0, I suggested she might want to talk to a medical oncologist to see if there was any clear advantage to having chemotherapy following this surgery.  She does not want to have chemotherapy and expressed that she won't set up this appointment.   I told her that surveillance CT scans would be needed, with a new baseline CT done in late November.   She will arrange for that locally, perhaps through Dr. Sary Christina.       Her daughter asked about further evaluation of her right lung nodule.   Dr. Rosado had initially felt this could be addressed following her surgery and recuperation, as it is small and could likely have SBRT if found to be malignant.      PLAN  I spent a total of 30 minutes with Ms. Gabriela Watkins and her daughter, more than 50% of which were spent in counseling, coordination of care, and face-to-face time. I reviewed the plan as follows:  F/U PRN  Get new baseline CT scan in 3 months (late November).   Call if you want this arranged here.  I will contact you once I talk to Dr. Rosado about right lung nodule and recommended evaluation of this.   She prefers an IR biopsy again through [facility] or [facility].  All questions were answered  and the patient and present family were in agreement with the plan.  I appreciate the opportunity to participate in the care of your patient and will keep you updated.  Sincerely,  SONI Garcia, CNS

## 2019-09-25 NOTE — NURSING NOTE
"Oncology Rooming Note    September 25, 2019 12:54 PM   Gabriela Watkins is a 79 year old female who presents for:    Chief Complaint   Patient presents with     Oncology Clinic Visit     return visit; lung cancer; vitals taken     Initial Vitals: /64   Pulse 56   Temp 97  F (36.1  C)   Wt 65.6 kg (144 lb 11.2 oz)   SpO2 98%   BMI 29.47 kg/m   Estimated body mass index is 29.47 kg/m  as calculated from the following:    Height as of 8/23/19: 1.493 m (4' 10.76\").    Weight as of this encounter: 65.6 kg (144 lb 11.2 oz). Body surface area is 1.65 meters squared.  No Pain (0) Comment: Data Unavailable   No LMP recorded. Patient has had a hysterectomy.  Allergies reviewed: Yes  Medications reviewed: Yes    Medications: Medication refills not needed today.  Pharmacy name entered into [employer]: [id number] [place] [address]    Clinical concerns: No new concerns today. Leilani Menard was NOT notified.      LEESA DE, DI            "

## 2019-09-26 ENCOUNTER — AMBULATORY - HEALTHEAST (OUTPATIENT)
Dept: ONCOLOGY | Facility: CLINIC | Age: 79
End: 2019-09-26

## 2019-09-27 RX ORDER — AMLODIPINE BESYLATE 10 MG/1
10 TABLET ORAL DAILY
Qty: 30 TABLET | Refills: 1 | Status: SHIPPED | OUTPATIENT
Start: 2019-09-27

## 2019-09-27 NOTE — PROGRESS NOTES
THORACIC SURGERY FOLLOW UP VISIT    Dear Ms. Kirsty Rainey PA-C,    I saw Ms. Watkins in follow-up today. The clinical summary follows:     PREOP DIAGNOSIS   Left upper lobe non-small cell carcinoma     PROCEDURE   1. Flexible bronchoscopy  2. Uniportal thoracoscopic LEFT upper lobe trisegmentectomy (S1-3)   3. Mediastinal lymph node sampling    DATE OF PROCEDURE  8/30/19    HISTOPATHOLOGY   FINAL DIAGNOSIS:   A. LYMPH NODES, 11L, EXCISION:   - Four lymph nodes, negative for tumor (0/4).     B. LYMPH NODE, 12L, EXCISION:   - Fragments of lymph node, negative for tumor (0/1).     C. LUNG, LEFT UPPER LOBE, TRISEGMENTECTOMY:   - INVASIVE MUCINOUS ADENOCARCINOMA, moderately differentiated with focal   areas of papillary and micropapillary   growth pattern, 5.2 cm in greatest dimension.   - Tumor involves the elastica interna, but not into elastica externa of   visceral pleura.   - Margins are negative for tumor; bronchovascular margin is at 2.7 cm from    tumor.   - No lymphovascular invasion identified.   - Adjacent lung parenchyma with emphysematous change and focal area of   lipoid pneumonia.   - Seven hilar lymph nodes, negative for tumor (0/7).   - The AJCC pathologic staging is pT3 N0.   - See synoptic report.     D. LYMPH NODES, LEVEL 5, EXCISION:   - Two lymph nodes, negative for tumor (0/2).     COMPLICATIONS  None    INTERVAL STUDIES  CXR today:  IMPRESSION:   1. Resolution of previous left apical pneumothorax.  2. Reduced left pleural effusion and decreased elevation of left  hemidiaphragm. Hazy opacity over the left hemidiaphragm may represent  scarring and/or atelectasis  3. Bibasilar atelectasis.  4. Right upper lung nodule similar in appearance compared to CT from  8/23/2019.    SUBJECTIVE   Sandra is here today with her daughter, Mary Kay.   She denies any new concerns.     From a personal perspective, she lives with her daughter.    IMPRESSION   Ms Watkins is here for a post-operative evaluation today.   She reports doing well, overall, but does get fatigued easily.   She and her daughter said she often sleeps after a meal for 30-60 minutes 2-3 times a day.   She sleeps well at night, complains of minimal discomfort and takes an occasional acetaminophen.       Her incision (uniportal) is well-healed with no erythema or drainage.   Her lung sound are clear to ausculation.   We reviewed her CXR while in clinic, no evidence of fluid or pneumothorax present.       Because her lung cancer was staged at pT3N0, I suggested she might want to talk to a medical oncologist to see if there was any clear advantage to having chemotherapy following this surgery.  She does not want to have chemotherapy and expressed that she won't set up this appointment.   I told her that surveillance CT scans would be needed, with a new baseline CT done in late November.   She will arrange for that locally, perhaps through Dr. Sary Christina.       Her daughter asked about further evaluation of her right lung nodule.   Dr. Rosado had initially felt this could be addressed following her surgery and recuperation, as it is small and could likely have SBRT if found to be malignant.      PLAN  I spent a total of 30 minutes with Ms. Gabriela Watkins and her daughter, more than 50% of which were spent in counseling, coordination of care, and face-to-face time. I reviewed the plan as follows:  F/U PRN  Get new baseline CT scan in 3 months (late November).   Call if you want this arranged here.  I will contact you once I talk to Dr. Rosado about right lung nodule and recommended evaluation of this.   She prefers an IR biopsy again through [facility] or [facility].  All questions were answered and the patient and present family were in agreement with the plan.  I appreciate the opportunity to participate in the care of your patient and will keep you updated.  Sincerely,  SONI Garcia, CNS    Answers for HPI/ROS submitted by  the patient on 9/22/2019   General Symptoms: No  Skin Symptoms: No  HENT Symptoms: No  EYE SYMPTOMS: No  HEART SYMPTOMS: No  LUNG SYMPTOMS: No  INTESTINAL SYMPTOMS: No  URINARY SYMPTOMS: No  GYNECOLOGIC SYMPTOMS: No  BREAST SYMPTOMS: No  SKELETAL SYMPTOMS: Yes  BLOOD SYMPTOMS: Yes  NERVOUS SYSTEM SYMPTOMS: No  MENTAL HEALTH SYMPTOMS: No  Back pain: Yes  Easy bleeding or bruising: Yes

## 2019-10-15 ENCOUNTER — OFFICE VISIT - HEALTHEAST (OUTPATIENT)
Dept: ONCOLOGY | Facility: HOSPITAL | Age: 79
End: 2019-10-15

## 2019-10-15 DIAGNOSIS — C34.92 PRIMARY LUNG ADENOCARCINOMA, LEFT (H): ICD-10-CM

## 2019-10-24 ENCOUNTER — RECORDS - HEALTHEAST (OUTPATIENT)
Dept: ADMINISTRATIVE | Facility: OTHER | Age: 79
End: 2019-10-24

## 2019-10-24 ENCOUNTER — AMBULATORY - HEALTHEAST (OUTPATIENT)
Dept: CARDIOLOGY | Facility: CLINIC | Age: 79
End: 2019-10-24

## 2019-10-28 ENCOUNTER — OFFICE VISIT - HEALTHEAST (OUTPATIENT)
Dept: CARDIOLOGY | Facility: CLINIC | Age: 79
End: 2019-10-28

## 2019-10-28 DIAGNOSIS — I63.9 ISCHEMIC CEREBROVASCULAR ACCIDENT (CVA) (H): ICD-10-CM

## 2019-10-28 DIAGNOSIS — I10 ESSENTIAL HYPERTENSION: ICD-10-CM

## 2019-10-28 DIAGNOSIS — C34.92 PRIMARY LUNG ADENOCARCINOMA, LEFT (H): ICD-10-CM

## 2019-10-28 DIAGNOSIS — I48.0 PAROXYSMAL ATRIAL FIBRILLATION (H): ICD-10-CM

## 2019-10-28 ASSESSMENT — MIFFLIN-ST. JEOR: SCORE: 1028.75

## 2019-10-30 ENCOUNTER — HOSPITAL ENCOUNTER (OUTPATIENT)
Dept: CT IMAGING | Facility: CLINIC | Age: 79
Setting detail: RADIATION/ONCOLOGY SERIES
Discharge: STILL ADMITTED OR ADMINISTRATIVE | End: 2019-10-30
Attending: INTERNAL MEDICINE

## 2019-10-30 ENCOUNTER — HOSPITAL ENCOUNTER (OUTPATIENT)
Dept: RADIOLOGY | Facility: CLINIC | Age: 79
Discharge: HOME | End: 2019-10-30
Attending: RADIOLOGY

## 2019-10-30 DIAGNOSIS — C34.92 PRIMARY LUNG ADENOCARCINOMA, LEFT (H): ICD-10-CM

## 2019-10-30 LAB
HGB BLD-MCNC: 10.1 G/DL (ref 12–16)
INR PPP: 1.03 (ref 0.9–1.1)
PLATELET # BLD AUTO: 246 THOU/UL (ref 140–440)

## 2019-10-30 ASSESSMENT — MIFFLIN-ST. JEOR: SCORE: 1015.12

## 2019-10-31 LAB
CAP COMMENT: ABNORMAL
LAB AP CHARGES (HE HISTORICAL CONVERSION): ABNORMAL
LAB AP INITIAL CYTO EVAL (HE HISTORICAL CONVERSION): ABNORMAL
LAB MED GENERAL PATH INTERP (HE HISTORICAL CONVERSION): ABNORMAL
PATH REPORT.COMMENTS IMP SPEC: ABNORMAL
PATH REPORT.FINAL DX SPEC: ABNORMAL
PATH REPORT.MICROSCOPIC SPEC OTHER STN: ABNORMAL
PATH REPORT.RELEVANT HX SPEC: ABNORMAL
SPECIMEN DESCRIPTION: ABNORMAL

## 2019-11-01 ENCOUNTER — AMBULATORY - HEALTHEAST (OUTPATIENT)
Dept: ONCOLOGY | Facility: CLINIC | Age: 79
End: 2019-11-01

## 2019-11-04 ENCOUNTER — HOSPITAL ENCOUNTER (OUTPATIENT)
Dept: CARDIOLOGY | Facility: HOSPITAL | Age: 79
Discharge: HOME | End: 2019-11-04
Attending: INTERNAL MEDICINE

## 2019-11-04 DIAGNOSIS — I48.0 PAROXYSMAL ATRIAL FIBRILLATION (H): ICD-10-CM

## 2019-11-08 ENCOUNTER — OFFICE VISIT - HEALTHEAST (OUTPATIENT)
Dept: ONCOLOGY | Facility: HOSPITAL | Age: 79
End: 2019-11-08

## 2019-11-08 DIAGNOSIS — C34.31 SMALL CELL LUNG CANCER, RIGHT LOWER LOBE (H): ICD-10-CM

## 2019-11-11 ENCOUNTER — HOSPITAL ENCOUNTER (OUTPATIENT)
Dept: MRI IMAGING | Facility: HOSPITAL | Age: 79
Setting detail: RADIATION/ONCOLOGY SERIES
Discharge: STILL ADMITTED OR ADMINISTRATIVE | End: 2019-11-11
Attending: INTERNAL MEDICINE

## 2019-11-11 ENCOUNTER — HOSPITAL ENCOUNTER (OUTPATIENT)
Dept: CT IMAGING | Facility: HOSPITAL | Age: 79
Setting detail: RADIATION/ONCOLOGY SERIES
Discharge: STILL ADMITTED OR ADMINISTRATIVE | End: 2019-11-11
Attending: INTERNAL MEDICINE

## 2019-11-11 DIAGNOSIS — C34.31 SMALL CELL LUNG CANCER, RIGHT LOWER LOBE (H): ICD-10-CM

## 2019-11-11 LAB
CREAT BLD-MCNC: 1 MG/DL (ref 0.6–1.1)
GFR SERPL CREATININE-BSD FRML MDRD: 53 ML/MIN/1.73M2

## 2019-11-15 ENCOUNTER — OFFICE VISIT - HEALTHEAST (OUTPATIENT)
Dept: ONCOLOGY | Facility: HOSPITAL | Age: 79
End: 2019-11-15

## 2019-11-15 DIAGNOSIS — C34.31 SMALL CELL LUNG CANCER, RIGHT LOWER LOBE (H): ICD-10-CM

## 2019-11-21 ENCOUNTER — AMBULATORY - HEALTHEAST (OUTPATIENT)
Dept: INFUSION THERAPY | Facility: HOSPITAL | Age: 79
End: 2019-11-21

## 2019-11-21 DIAGNOSIS — C34.31 SMALL CELL LUNG CANCER, RIGHT LOWER LOBE (H): ICD-10-CM

## 2019-12-02 ENCOUNTER — AMBULATORY - HEALTHEAST (OUTPATIENT)
Dept: INFUSION THERAPY | Facility: HOSPITAL | Age: 79
End: 2019-12-02

## 2019-12-02 ENCOUNTER — OFFICE VISIT - HEALTHEAST (OUTPATIENT)
Dept: ONCOLOGY | Facility: HOSPITAL | Age: 79
End: 2019-12-02

## 2019-12-02 ENCOUNTER — INFUSION - HEALTHEAST (OUTPATIENT)
Dept: INFUSION THERAPY | Facility: HOSPITAL | Age: 79
End: 2019-12-02

## 2019-12-02 DIAGNOSIS — C34.31 SMALL CELL LUNG CANCER, RIGHT LOWER LOBE (H): ICD-10-CM

## 2019-12-02 DIAGNOSIS — Z51.11 ENCOUNTER FOR ANTINEOPLASTIC CHEMOTHERAPY: ICD-10-CM

## 2019-12-02 LAB
ALBUMIN SERPL-MCNC: 3.8 G/DL (ref 3.5–5)
ALP SERPL-CCNC: 106 U/L (ref 45–120)
ALT SERPL W P-5'-P-CCNC: 14 U/L (ref 0–45)
ANION GAP SERPL CALCULATED.3IONS-SCNC: 9 MMOL/L (ref 5–18)
AST SERPL W P-5'-P-CCNC: 15 U/L (ref 0–40)
BASOPHILS # BLD AUTO: 0 THOU/UL (ref 0–0.2)
BASOPHILS NFR BLD AUTO: 0 % (ref 0–2)
BILIRUB SERPL-MCNC: 0.3 MG/DL (ref 0–1)
BUN SERPL-MCNC: 35 MG/DL (ref 8–28)
CALCIUM SERPL-MCNC: 9.4 MG/DL (ref 8.5–10.5)
CHLORIDE BLD-SCNC: 107 MMOL/L (ref 98–107)
CO2 SERPL-SCNC: 23 MMOL/L (ref 22–31)
CREAT SERPL-MCNC: 1.09 MG/DL (ref 0.6–1.1)
EOSINOPHIL # BLD AUTO: 0.1 THOU/UL (ref 0–0.4)
EOSINOPHIL NFR BLD AUTO: 1 % (ref 0–6)
ERYTHROCYTE [DISTWIDTH] IN BLOOD BY AUTOMATED COUNT: 14 % (ref 11–14.5)
GFR SERPL CREATININE-BSD FRML MDRD: 48 ML/MIN/1.73M2
GLUCOSE BLD-MCNC: 104 MG/DL (ref 70–125)
HCT VFR BLD AUTO: 32.3 % (ref 35–47)
HGB BLD-MCNC: 10.2 G/DL (ref 12–16)
LYMPHOCYTES # BLD AUTO: 1.3 THOU/UL (ref 0.8–4.4)
LYMPHOCYTES NFR BLD AUTO: 14 % (ref 20–40)
MAGNESIUM SERPL-MCNC: 1.9 MG/DL (ref 1.8–2.6)
MCH RBC QN AUTO: 27.2 PG (ref 27–34)
MCHC RBC AUTO-ENTMCNC: 31.6 G/DL (ref 32–36)
MCV RBC AUTO: 86 FL (ref 80–100)
MONOCYTES # BLD AUTO: 0.8 THOU/UL (ref 0–0.9)
MONOCYTES NFR BLD AUTO: 9 % (ref 2–10)
NEUTROPHILS # BLD AUTO: 6.7 THOU/UL (ref 2–7.7)
NEUTROPHILS NFR BLD AUTO: 75 % (ref 50–70)
PLATELET # BLD AUTO: 257 THOU/UL (ref 140–440)
PMV BLD AUTO: 10.1 FL (ref 8.5–12.5)
POTASSIUM BLD-SCNC: 3.8 MMOL/L (ref 3.5–5)
PROT SERPL-MCNC: 7.1 G/DL (ref 6–8)
RBC # BLD AUTO: 3.75 MILL/UL (ref 3.8–5.4)
SODIUM SERPL-SCNC: 139 MMOL/L (ref 136–145)
WBC: 8.9 THOU/UL (ref 4–11)

## 2019-12-02 ASSESSMENT — MIFFLIN-ST. JEOR: SCORE: 1028.79

## 2019-12-03 ENCOUNTER — INFUSION - HEALTHEAST (OUTPATIENT)
Dept: INFUSION THERAPY | Facility: HOSPITAL | Age: 79
End: 2019-12-03

## 2019-12-03 ENCOUNTER — AMBULATORY - HEALTHEAST (OUTPATIENT)
Dept: ONCOLOGY | Facility: HOSPITAL | Age: 79
End: 2019-12-03

## 2019-12-03 DIAGNOSIS — C34.31 SMALL CELL LUNG CANCER, RIGHT LOWER LOBE (H): ICD-10-CM

## 2019-12-04 ENCOUNTER — INFUSION - HEALTHEAST (OUTPATIENT)
Dept: INFUSION THERAPY | Facility: HOSPITAL | Age: 79
End: 2019-12-04

## 2019-12-04 DIAGNOSIS — C34.31 SMALL CELL LUNG CANCER, RIGHT LOWER LOBE (H): ICD-10-CM

## 2019-12-05 ENCOUNTER — OFFICE VISIT - HEALTHEAST (OUTPATIENT)
Dept: CARDIOLOGY | Facility: CLINIC | Age: 79
End: 2019-12-05

## 2019-12-05 ENCOUNTER — INFUSION - HEALTHEAST (OUTPATIENT)
Dept: INFUSION THERAPY | Facility: HOSPITAL | Age: 79
End: 2019-12-05

## 2019-12-05 DIAGNOSIS — I10 ESSENTIAL HYPERTENSION: ICD-10-CM

## 2019-12-05 DIAGNOSIS — I48.0 PAROXYSMAL ATRIAL FIBRILLATION (H): ICD-10-CM

## 2019-12-05 DIAGNOSIS — C34.31 SMALL CELL LUNG CANCER, RIGHT LOWER LOBE (H): ICD-10-CM

## 2019-12-05 DIAGNOSIS — Z51.11 ENCOUNTER FOR ANTINEOPLASTIC CHEMOTHERAPY: ICD-10-CM

## 2019-12-05 DIAGNOSIS — C34.92 PRIMARY LUNG ADENOCARCINOMA, LEFT (H): ICD-10-CM

## 2019-12-05 ASSESSMENT — MIFFLIN-ST. JEOR: SCORE: 1046.03

## 2019-12-06 ENCOUNTER — COMMUNICATION - HEALTHEAST (OUTPATIENT)
Dept: ONCOLOGY | Facility: HOSPITAL | Age: 79
End: 2019-12-06

## 2019-12-11 ENCOUNTER — AMBULATORY - HEALTHEAST (OUTPATIENT)
Dept: INFUSION THERAPY | Facility: HOSPITAL | Age: 79
End: 2019-12-11

## 2019-12-11 ENCOUNTER — OFFICE VISIT - HEALTHEAST (OUTPATIENT)
Dept: ONCOLOGY | Facility: HOSPITAL | Age: 79
End: 2019-12-11

## 2019-12-11 DIAGNOSIS — C34.31 SMALL CELL LUNG CANCER, RIGHT LOWER LOBE (H): ICD-10-CM

## 2019-12-11 DIAGNOSIS — R05.9 COUGH: ICD-10-CM

## 2019-12-11 DIAGNOSIS — J06.9 UPPER RESPIRATORY TRACT INFECTION, UNSPECIFIED TYPE: ICD-10-CM

## 2019-12-11 DIAGNOSIS — Z51.11 ENCOUNTER FOR ANTINEOPLASTIC CHEMOTHERAPY: ICD-10-CM

## 2019-12-11 LAB
ALBUMIN SERPL-MCNC: 3.7 G/DL (ref 3.5–5)
ALP SERPL-CCNC: 124 U/L (ref 45–120)
ALT SERPL W P-5'-P-CCNC: 22 U/L (ref 0–45)
ANION GAP SERPL CALCULATED.3IONS-SCNC: 8 MMOL/L (ref 5–18)
AST SERPL W P-5'-P-CCNC: 16 U/L (ref 0–40)
BASOPHILS # BLD AUTO: 0 THOU/UL (ref 0–0.2)
BASOPHILS NFR BLD AUTO: 0 % (ref 0–2)
BILIRUB SERPL-MCNC: 1.1 MG/DL (ref 0–1)
BUN SERPL-MCNC: 17 MG/DL (ref 8–28)
CALCIUM SERPL-MCNC: 9.3 MG/DL (ref 8.5–10.5)
CHLORIDE BLD-SCNC: 103 MMOL/L (ref 98–107)
CO2 SERPL-SCNC: 25 MMOL/L (ref 22–31)
CREAT SERPL-MCNC: 1.2 MG/DL (ref 0.6–1.1)
EOSINOPHIL COUNT (ABSOLUTE): 0 THOU/UL (ref 0–0.4)
EOSINOPHIL NFR BLD AUTO: 2 % (ref 0–6)
ERYTHROCYTE [DISTWIDTH] IN BLOOD BY AUTOMATED COUNT: 14.1 % (ref 11–14.5)
GFR SERPL CREATININE-BSD FRML MDRD: 43 ML/MIN/1.73M2
GLUCOSE BLD-MCNC: 133 MG/DL (ref 70–125)
HCT VFR BLD AUTO: 31.2 % (ref 35–47)
HGB BLD-MCNC: 9.6 G/DL (ref 12–16)
LYMPHOCYTES # BLD AUTO: 0.4 THOU/UL (ref 0.8–4.4)
LYMPHOCYTES NFR BLD AUTO: 80 % (ref 20–40)
MAGNESIUM SERPL-MCNC: 1.8 MG/DL (ref 1.8–2.6)
MCH RBC QN AUTO: 25.3 PG (ref 27–34)
MCHC RBC AUTO-ENTMCNC: 30.8 G/DL (ref 32–36)
MCV RBC AUTO: 82 FL (ref 80–100)
MONOCYTES # BLD AUTO: 0.1 THOU/UL (ref 0–0.9)
MONOCYTES NFR BLD AUTO: 12 % (ref 2–10)
OVALOCYTES: ABNORMAL
PLAT MORPH BLD: ABNORMAL
PLATELET # BLD AUTO: 93 THOU/UL (ref 140–440)
PMV BLD AUTO: 10.4 FL (ref 8.5–12.5)
POTASSIUM BLD-SCNC: 3.7 MMOL/L (ref 3.5–5)
PROT SERPL-MCNC: 7.2 G/DL (ref 6–8)
RBC # BLD AUTO: 3.79 MILL/UL (ref 3.8–5.4)
SODIUM SERPL-SCNC: 136 MMOL/L (ref 136–145)
TEAR DROP: ABNORMAL
TOTAL NEUTROPHILS-ABS(DIFF): 0 THOU/UL (ref 2–7.7)
TOTAL NEUTROPHILS-REL(DIFF): 6 % (ref 50–70)
WBC: 0.5 THOU/UL (ref 4–11)

## 2019-12-11 ASSESSMENT — MIFFLIN-ST. JEOR: SCORE: 1008.38

## 2019-12-17 ENCOUNTER — COMMUNICATION - HEALTHEAST (OUTPATIENT)
Dept: ONCOLOGY | Facility: CLINIC | Age: 79
End: 2019-12-17

## 2019-12-23 ENCOUNTER — AMBULATORY - HEALTHEAST (OUTPATIENT)
Dept: ONCOLOGY | Facility: CLINIC | Age: 79
End: 2019-12-23

## 2019-12-23 ENCOUNTER — AMBULATORY - HEALTHEAST (OUTPATIENT)
Dept: INFUSION THERAPY | Facility: HOSPITAL | Age: 79
End: 2019-12-23

## 2019-12-23 ENCOUNTER — INFUSION - HEALTHEAST (OUTPATIENT)
Dept: INFUSION THERAPY | Facility: HOSPITAL | Age: 79
End: 2019-12-23

## 2019-12-23 ENCOUNTER — OFFICE VISIT - HEALTHEAST (OUTPATIENT)
Dept: ONCOLOGY | Facility: HOSPITAL | Age: 79
End: 2019-12-23

## 2019-12-23 DIAGNOSIS — T45.1X5A CHEMOTHERAPY-INDUCED NEUTROPENIA (H): ICD-10-CM

## 2019-12-23 DIAGNOSIS — D70.1 CHEMOTHERAPY-INDUCED NEUTROPENIA (H): ICD-10-CM

## 2019-12-23 DIAGNOSIS — Z51.11 ENCOUNTER FOR ANTINEOPLASTIC CHEMOTHERAPY: ICD-10-CM

## 2019-12-23 DIAGNOSIS — C34.31 SMALL CELL LUNG CANCER, RIGHT LOWER LOBE (H): ICD-10-CM

## 2019-12-23 LAB
ALBUMIN SERPL-MCNC: 3 G/DL (ref 3.5–5)
ALP SERPL-CCNC: 84 U/L (ref 45–120)
ALT SERPL W P-5'-P-CCNC: 12 U/L (ref 0–45)
ANION GAP SERPL CALCULATED.3IONS-SCNC: 9 MMOL/L (ref 5–18)
AST SERPL W P-5'-P-CCNC: 13 U/L (ref 0–40)
BASOPHILS # BLD AUTO: 0.1 THOU/UL (ref 0–0.2)
BASOPHILS NFR BLD AUTO: 1 % (ref 0–2)
BILIRUB SERPL-MCNC: 0.3 MG/DL (ref 0–1)
BUN SERPL-MCNC: 16 MG/DL (ref 8–28)
CALCIUM SERPL-MCNC: 9 MG/DL (ref 8.5–10.5)
CHLORIDE BLD-SCNC: 106 MMOL/L (ref 98–107)
CO2 SERPL-SCNC: 27 MMOL/L (ref 22–31)
CREAT SERPL-MCNC: 0.95 MG/DL (ref 0.6–1.1)
EOSINOPHIL # BLD AUTO: 0 THOU/UL (ref 0–0.4)
EOSINOPHIL NFR BLD AUTO: 0 % (ref 0–6)
ERYTHROCYTE [DISTWIDTH] IN BLOOD BY AUTOMATED COUNT: 17.2 % (ref 11–14.5)
GFR SERPL CREATININE-BSD FRML MDRD: 56 ML/MIN/1.73M2
GLUCOSE BLD-MCNC: 141 MG/DL (ref 70–125)
HCT VFR BLD AUTO: 30.8 % (ref 35–47)
HGB BLD-MCNC: 9.3 G/DL (ref 12–16)
LYMPHOCYTES # BLD AUTO: 1.3 THOU/UL (ref 0.8–4.4)
LYMPHOCYTES NFR BLD AUTO: 14 % (ref 20–40)
MAGNESIUM SERPL-MCNC: 1.8 MG/DL (ref 1.8–2.6)
MCH RBC QN AUTO: 25.8 PG (ref 27–34)
MCHC RBC AUTO-ENTMCNC: 30.2 G/DL (ref 32–36)
MCV RBC AUTO: 86 FL (ref 80–100)
MONOCYTES # BLD AUTO: 0.6 THOU/UL (ref 0–0.9)
MONOCYTES NFR BLD AUTO: 7 % (ref 2–10)
NEUTROPHILS # BLD AUTO: 7.1 THOU/UL (ref 2–7.7)
NEUTROPHILS NFR BLD AUTO: 77 % (ref 50–70)
PLATELET # BLD AUTO: 425 THOU/UL (ref 140–440)
PMV BLD AUTO: 9.3 FL (ref 8.5–12.5)
POTASSIUM BLD-SCNC: 3.6 MMOL/L (ref 3.5–5)
PROT SERPL-MCNC: 6.6 G/DL (ref 6–8)
RBC # BLD AUTO: 3.6 MILL/UL (ref 3.8–5.4)
SODIUM SERPL-SCNC: 142 MMOL/L (ref 136–145)
WBC: 9.2 THOU/UL (ref 4–11)

## 2019-12-23 ASSESSMENT — MIFFLIN-ST. JEOR: SCORE: 1008.83

## 2019-12-24 ENCOUNTER — INFUSION - HEALTHEAST (OUTPATIENT)
Dept: INFUSION THERAPY | Facility: HOSPITAL | Age: 79
End: 2019-12-24

## 2019-12-24 DIAGNOSIS — C34.31 SMALL CELL LUNG CANCER, RIGHT LOWER LOBE (H): ICD-10-CM

## 2019-12-24 DIAGNOSIS — Z51.11 ENCOUNTER FOR ANTINEOPLASTIC CHEMOTHERAPY: ICD-10-CM

## 2019-12-26 ENCOUNTER — INFUSION - HEALTHEAST (OUTPATIENT)
Dept: INFUSION THERAPY | Facility: HOSPITAL | Age: 79
End: 2019-12-26

## 2019-12-26 ENCOUNTER — AMBULATORY - HEALTHEAST (OUTPATIENT)
Dept: PHARMACY | Facility: HOSPITAL | Age: 79
End: 2019-12-26

## 2019-12-26 DIAGNOSIS — Z51.11 ENCOUNTER FOR ANTINEOPLASTIC CHEMOTHERAPY: ICD-10-CM

## 2019-12-26 DIAGNOSIS — C34.31 SMALL CELL LUNG CANCER, RIGHT LOWER LOBE (H): ICD-10-CM

## 2019-12-27 ENCOUNTER — INFUSION - HEALTHEAST (OUTPATIENT)
Dept: INFUSION THERAPY | Facility: HOSPITAL | Age: 79
End: 2019-12-27

## 2019-12-27 DIAGNOSIS — C34.31 SMALL CELL LUNG CANCER, RIGHT LOWER LOBE (H): ICD-10-CM

## 2019-12-27 DIAGNOSIS — Z51.11 ENCOUNTER FOR ANTINEOPLASTIC CHEMOTHERAPY: ICD-10-CM

## 2019-12-30 ENCOUNTER — COMMUNICATION - HEALTHEAST (OUTPATIENT)
Dept: ONCOLOGY | Facility: CLINIC | Age: 79
End: 2019-12-30

## 2020-01-01 ENCOUNTER — COMMUNICATION - HEALTHEAST (OUTPATIENT)
Dept: CARDIOLOGY | Facility: CLINIC | Age: 80
End: 2020-01-01

## 2020-01-01 ENCOUNTER — HOSPITAL ENCOUNTER (OUTPATIENT)
Dept: CT IMAGING | Facility: CLINIC | Age: 80
Setting detail: RADIATION/ONCOLOGY SERIES
Discharge: STILL ADMITTED OR ADMINISTRATIVE | End: 2020-10-12
Attending: RADIOLOGY

## 2020-01-01 ENCOUNTER — RECORDS - HEALTHEAST (OUTPATIENT)
Dept: ADMINISTRATIVE | Facility: OTHER | Age: 80
End: 2020-01-01

## 2020-01-01 ENCOUNTER — RECORDS - HEALTHEAST (OUTPATIENT)
Dept: LAB | Facility: CLINIC | Age: 80
End: 2020-01-01

## 2020-01-01 ENCOUNTER — COMMUNICATION - HEALTHEAST (OUTPATIENT)
Dept: ONCOLOGY | Facility: CLINIC | Age: 80
End: 2020-01-01

## 2020-01-01 ENCOUNTER — OFFICE VISIT - HEALTHEAST (OUTPATIENT)
Dept: RADIATION ONCOLOGY | Facility: HOSPITAL | Age: 80
End: 2020-01-01

## 2020-01-01 ENCOUNTER — AMBULATORY - HEALTHEAST (OUTPATIENT)
Dept: INFUSION THERAPY | Facility: HOSPITAL | Age: 80
End: 2020-01-01

## 2020-01-01 ENCOUNTER — OFFICE VISIT - HEALTHEAST (OUTPATIENT)
Dept: ONCOLOGY | Facility: HOSPITAL | Age: 80
End: 2020-01-01

## 2020-01-01 DIAGNOSIS — C34.31 SMALL CELL LUNG CANCER, RIGHT LOWER LOBE (H): ICD-10-CM

## 2020-01-01 DIAGNOSIS — C34.92 PRIMARY LUNG ADENOCARCINOMA, LEFT (H): ICD-10-CM

## 2020-01-01 DIAGNOSIS — D50.9 MICROCYTIC ANEMIA: ICD-10-CM

## 2020-01-01 DIAGNOSIS — I48.0 PAROXYSMAL ATRIAL FIBRILLATION (H): ICD-10-CM

## 2020-01-01 LAB
ALBUMIN SERPL-MCNC: 4.3 G/DL (ref 3.5–5)
ALP SERPL-CCNC: 123 U/L (ref 45–120)
ALT SERPL W P-5'-P-CCNC: 13 U/L (ref 0–45)
ANION GAP SERPL CALCULATED.3IONS-SCNC: 14 MMOL/L (ref 5–18)
AST SERPL W P-5'-P-CCNC: 15 U/L (ref 0–40)
BASOPHILS # BLD AUTO: 0 THOU/UL (ref 0–0.2)
BASOPHILS NFR BLD AUTO: 0 % (ref 0–2)
BILIRUB SERPL-MCNC: 0.4 MG/DL (ref 0–1)
BUN SERPL-MCNC: 26 MG/DL (ref 8–28)
CALCIUM SERPL-MCNC: 9 MG/DL (ref 8.5–10.5)
CHLORIDE BLD-SCNC: 106 MMOL/L (ref 98–107)
CHOLEST SERPL-MCNC: 156 MG/DL
CO2 SERPL-SCNC: 23 MMOL/L (ref 22–31)
CREAT SERPL-MCNC: 1.24 MG/DL (ref 0.6–1.1)
EOSINOPHIL # BLD AUTO: 0.1 THOU/UL (ref 0–0.4)
EOSINOPHIL NFR BLD AUTO: 2 % (ref 0–6)
ERYTHROCYTE [DISTWIDTH] IN BLOOD BY AUTOMATED COUNT: 15.6 % (ref 11–14.5)
FASTING STATUS PATIENT QL REPORTED: NORMAL
FERRITIN SERPL-MCNC: 69 NG/ML (ref 10–130)
GFR SERPL CREATININE-BSD FRML MDRD: 41 ML/MIN/1.73M2
GLUCOSE BLD-MCNC: 88 MG/DL (ref 70–125)
HCT VFR BLD AUTO: 41.4 % (ref 35–47)
HDLC SERPL-MCNC: 82 MG/DL
HGB BLD-MCNC: 13.3 G/DL (ref 12–16)
IMM GRANULOCYTES # BLD: 0 THOU/UL
IMM GRANULOCYTES NFR BLD: 0 %
LDLC SERPL CALC-MCNC: 50 MG/DL
LYMPHOCYTES # BLD AUTO: 1.2 THOU/UL (ref 0.8–4.4)
LYMPHOCYTES NFR BLD AUTO: 24 % (ref 20–40)
MAGNESIUM SERPL-MCNC: 2.1 MG/DL (ref 1.8–2.6)
MCH RBC QN AUTO: 30.3 PG (ref 27–34)
MCHC RBC AUTO-ENTMCNC: 32.1 G/DL (ref 32–36)
MCV RBC AUTO: 94 FL (ref 80–100)
MONOCYTES # BLD AUTO: 0.5 THOU/UL (ref 0–0.9)
MONOCYTES NFR BLD AUTO: 10 % (ref 2–10)
NEUTROPHILS # BLD AUTO: 3.2 THOU/UL (ref 2–7.7)
NEUTROPHILS NFR BLD AUTO: 64 % (ref 50–70)
PLATELET # BLD AUTO: 189 THOU/UL (ref 140–440)
PMV BLD AUTO: 9.2 FL (ref 8.5–12.5)
POTASSIUM BLD-SCNC: 3.8 MMOL/L (ref 3.5–5)
PROT SERPL-MCNC: 7 G/DL (ref 6–8)
RBC # BLD AUTO: 4.39 MILL/UL (ref 3.8–5.4)
SODIUM SERPL-SCNC: 143 MMOL/L (ref 136–145)
TRIGL SERPL-MCNC: 119 MG/DL
VIT B12 SERPL-MCNC: 344 PG/ML (ref 213–816)
WBC: 5 THOU/UL (ref 4–11)

## 2020-01-02 ENCOUNTER — COMMUNICATION - HEALTHEAST (OUTPATIENT)
Dept: ONCOLOGY | Facility: HOSPITAL | Age: 80
End: 2020-01-02

## 2020-01-03 ENCOUNTER — COMMUNICATION - HEALTHEAST (OUTPATIENT)
Dept: ONCOLOGY | Facility: HOSPITAL | Age: 80
End: 2020-01-03

## 2020-01-03 DIAGNOSIS — K59.00 CONSTIPATION: ICD-10-CM

## 2020-01-10 ENCOUNTER — HOSPITAL ENCOUNTER (OUTPATIENT)
Dept: CT IMAGING | Facility: HOSPITAL | Age: 80
Setting detail: RADIATION/ONCOLOGY SERIES
Discharge: STILL ADMITTED OR ADMINISTRATIVE | End: 2020-01-10
Attending: INTERNAL MEDICINE

## 2020-01-10 DIAGNOSIS — C34.31 SMALL CELL LUNG CANCER, RIGHT LOWER LOBE (H): ICD-10-CM

## 2020-01-10 LAB
CREAT BLD-MCNC: 1.2 MG/DL (ref 0.6–1.1)
GFR SERPL CREATININE-BSD FRML MDRD: 43 ML/MIN/1.73M2

## 2020-01-13 ENCOUNTER — AMBULATORY - HEALTHEAST (OUTPATIENT)
Dept: INFUSION THERAPY | Facility: HOSPITAL | Age: 80
End: 2020-01-13

## 2020-01-13 ENCOUNTER — INFUSION - HEALTHEAST (OUTPATIENT)
Dept: INFUSION THERAPY | Facility: HOSPITAL | Age: 80
End: 2020-01-13

## 2020-01-13 ENCOUNTER — OFFICE VISIT - HEALTHEAST (OUTPATIENT)
Dept: ONCOLOGY | Facility: HOSPITAL | Age: 80
End: 2020-01-13

## 2020-01-13 DIAGNOSIS — Z51.11 ENCOUNTER FOR ANTINEOPLASTIC CHEMOTHERAPY: ICD-10-CM

## 2020-01-13 DIAGNOSIS — C34.31 SMALL CELL LUNG CANCER, RIGHT LOWER LOBE (H): ICD-10-CM

## 2020-01-13 DIAGNOSIS — C34.92 PRIMARY LUNG ADENOCARCINOMA, LEFT (H): ICD-10-CM

## 2020-01-13 LAB
ALBUMIN SERPL-MCNC: 3.4 G/DL (ref 3.5–5)
ALP SERPL-CCNC: 88 U/L (ref 45–120)
ALT SERPL W P-5'-P-CCNC: 13 U/L (ref 0–45)
ANION GAP SERPL CALCULATED.3IONS-SCNC: 8 MMOL/L (ref 5–18)
AST SERPL W P-5'-P-CCNC: 15 U/L (ref 0–40)
BASOPHILS # BLD AUTO: 0 THOU/UL (ref 0–0.2)
BASOPHILS NFR BLD AUTO: 1 % (ref 0–2)
BILIRUB SERPL-MCNC: 0.3 MG/DL (ref 0–1)
BUN SERPL-MCNC: 15 MG/DL (ref 8–28)
CALCIUM SERPL-MCNC: 8.6 MG/DL (ref 8.5–10.5)
CHLORIDE BLD-SCNC: 106 MMOL/L (ref 98–107)
CO2 SERPL-SCNC: 27 MMOL/L (ref 22–31)
CREAT SERPL-MCNC: 1.02 MG/DL (ref 0.6–1.1)
EOSINOPHIL # BLD AUTO: 0 THOU/UL (ref 0–0.4)
EOSINOPHIL NFR BLD AUTO: 0 % (ref 0–6)
ERYTHROCYTE [DISTWIDTH] IN BLOOD BY AUTOMATED COUNT: 24.6 % (ref 11–14.5)
GFR SERPL CREATININE-BSD FRML MDRD: 52 ML/MIN/1.73M2
GLUCOSE BLD-MCNC: 135 MG/DL (ref 70–125)
HCT VFR BLD AUTO: 27.7 % (ref 35–47)
HGB BLD-MCNC: 8.4 G/DL (ref 12–16)
LYMPHOCYTES # BLD AUTO: 1.3 THOU/UL (ref 0.8–4.4)
LYMPHOCYTES NFR BLD AUTO: 16 % (ref 20–40)
MAGNESIUM SERPL-MCNC: 1.5 MG/DL (ref 1.8–2.6)
MCH RBC QN AUTO: 27.1 PG (ref 27–34)
MCHC RBC AUTO-ENTMCNC: 30.3 G/DL (ref 32–36)
MCV RBC AUTO: 89 FL (ref 80–100)
MONOCYTES # BLD AUTO: 1.1 THOU/UL (ref 0–0.9)
MONOCYTES NFR BLD AUTO: 13 % (ref 2–10)
NEUTROPHILS # BLD AUTO: 5.7 THOU/UL (ref 2–7.7)
NEUTROPHILS NFR BLD AUTO: 69 % (ref 50–70)
OVALOCYTES: ABNORMAL
PLAT MORPH BLD: NORMAL
PLATELET # BLD AUTO: 368 THOU/UL (ref 140–440)
PMV BLD AUTO: 9.2 FL (ref 8.5–12.5)
POLYCHROMASIA BLD QL SMEAR: ABNORMAL
POTASSIUM BLD-SCNC: 2.9 MMOL/L (ref 3.5–5)
PROT SERPL-MCNC: 6.3 G/DL (ref 6–8)
RBC # BLD AUTO: 3.1 MILL/UL (ref 3.8–5.4)
SODIUM SERPL-SCNC: 141 MMOL/L (ref 136–145)
WBC: 8.3 THOU/UL (ref 4–11)

## 2020-01-14 ENCOUNTER — INFUSION - HEALTHEAST (OUTPATIENT)
Dept: INFUSION THERAPY | Facility: HOSPITAL | Age: 80
End: 2020-01-14

## 2020-01-14 DIAGNOSIS — Z51.11 ENCOUNTER FOR ANTINEOPLASTIC CHEMOTHERAPY: ICD-10-CM

## 2020-01-14 DIAGNOSIS — C34.31 SMALL CELL LUNG CANCER, RIGHT LOWER LOBE (H): ICD-10-CM

## 2020-01-15 ENCOUNTER — INFUSION - HEALTHEAST (OUTPATIENT)
Dept: INFUSION THERAPY | Facility: HOSPITAL | Age: 80
End: 2020-01-15

## 2020-01-15 DIAGNOSIS — C34.31 SMALL CELL LUNG CANCER, RIGHT LOWER LOBE (H): ICD-10-CM

## 2020-01-15 DIAGNOSIS — Z51.11 ENCOUNTER FOR ANTINEOPLASTIC CHEMOTHERAPY: ICD-10-CM

## 2020-01-16 ENCOUNTER — INFUSION - HEALTHEAST (OUTPATIENT)
Dept: INFUSION THERAPY | Facility: HOSPITAL | Age: 80
End: 2020-01-16

## 2020-01-16 DIAGNOSIS — Z51.11 ENCOUNTER FOR ANTINEOPLASTIC CHEMOTHERAPY: ICD-10-CM

## 2020-01-16 DIAGNOSIS — C34.31 SMALL CELL LUNG CANCER, RIGHT LOWER LOBE (H): ICD-10-CM

## 2020-01-19 ENCOUNTER — COMMUNICATION - HEALTHEAST (OUTPATIENT)
Dept: CARDIOLOGY | Facility: CLINIC | Age: 80
End: 2020-01-19

## 2020-01-19 DIAGNOSIS — I48.0 PAROXYSMAL ATRIAL FIBRILLATION (H): ICD-10-CM

## 2020-02-03 ENCOUNTER — OFFICE VISIT - HEALTHEAST (OUTPATIENT)
Dept: ONCOLOGY | Facility: HOSPITAL | Age: 80
End: 2020-02-03

## 2020-02-03 ENCOUNTER — AMBULATORY - HEALTHEAST (OUTPATIENT)
Dept: INFUSION THERAPY | Facility: HOSPITAL | Age: 80
End: 2020-02-03

## 2020-02-03 ENCOUNTER — INFUSION - HEALTHEAST (OUTPATIENT)
Dept: INFUSION THERAPY | Facility: HOSPITAL | Age: 80
End: 2020-02-03

## 2020-02-03 DIAGNOSIS — C34.31 SMALL CELL LUNG CANCER, RIGHT LOWER LOBE (H): ICD-10-CM

## 2020-02-03 DIAGNOSIS — Z51.11 ENCOUNTER FOR ANTINEOPLASTIC CHEMOTHERAPY: ICD-10-CM

## 2020-02-03 LAB
ALBUMIN SERPL-MCNC: 3.6 G/DL (ref 3.5–5)
ALP SERPL-CCNC: 100 U/L (ref 45–120)
ALT SERPL W P-5'-P-CCNC: <9 U/L (ref 0–45)
ANION GAP SERPL CALCULATED.3IONS-SCNC: 7 MMOL/L (ref 5–18)
AST SERPL W P-5'-P-CCNC: 13 U/L (ref 0–40)
BASOPHILS # BLD AUTO: 0 THOU/UL (ref 0–0.2)
BASOPHILS NFR BLD AUTO: 1 % (ref 0–2)
BILIRUB SERPL-MCNC: 0.4 MG/DL (ref 0–1)
BUN SERPL-MCNC: 18 MG/DL (ref 8–28)
CALCIUM SERPL-MCNC: 9 MG/DL (ref 8.5–10.5)
CHLORIDE BLD-SCNC: 109 MMOL/L (ref 98–107)
CO2 SERPL-SCNC: 25 MMOL/L (ref 22–31)
CREAT SERPL-MCNC: 0.92 MG/DL (ref 0.6–1.1)
EOSINOPHIL # BLD AUTO: 0 THOU/UL (ref 0–0.4)
EOSINOPHIL NFR BLD AUTO: 1 % (ref 0–6)
ERYTHROCYTE [DISTWIDTH] IN BLOOD BY AUTOMATED COUNT: 25.5 % (ref 11–14.5)
GFR SERPL CREATININE-BSD FRML MDRD: 59 ML/MIN/1.73M2
GLUCOSE BLD-MCNC: 97 MG/DL (ref 70–125)
HCT VFR BLD AUTO: 30.8 % (ref 35–47)
HGB BLD-MCNC: 9.1 G/DL (ref 12–16)
LYMPHOCYTES # BLD AUTO: 1 THOU/UL (ref 0.8–4.4)
LYMPHOCYTES NFR BLD AUTO: 16 % (ref 20–40)
MAGNESIUM SERPL-MCNC: 1.8 MG/DL (ref 1.8–2.6)
MCH RBC QN AUTO: 27.8 PG (ref 27–34)
MCHC RBC AUTO-ENTMCNC: 29.5 G/DL (ref 32–36)
MCV RBC AUTO: 94 FL (ref 80–100)
MONOCYTES # BLD AUTO: 0.8 THOU/UL (ref 0–0.9)
MONOCYTES NFR BLD AUTO: 12 % (ref 2–10)
NEUTROPHILS # BLD AUTO: 4.3 THOU/UL (ref 2–7.7)
NEUTROPHILS NFR BLD AUTO: 70 % (ref 50–70)
OVALOCYTES: ABNORMAL
PLAT MORPH BLD: NORMAL
PLATELET # BLD AUTO: 243 THOU/UL (ref 140–440)
PMV BLD AUTO: 9.3 FL (ref 8.5–12.5)
POLYCHROMASIA BLD QL SMEAR: ABNORMAL
POTASSIUM BLD-SCNC: 3.9 MMOL/L (ref 3.5–5)
PROT SERPL-MCNC: 6.3 G/DL (ref 6–8)
RBC # BLD AUTO: 3.27 MILL/UL (ref 3.8–5.4)
SCHISTOCYTES: ABNORMAL
SODIUM SERPL-SCNC: 141 MMOL/L (ref 136–145)
WBC: 6.2 THOU/UL (ref 4–11)

## 2020-02-03 ASSESSMENT — MIFFLIN-ST. JEOR: SCORE: 997.94

## 2020-02-04 ENCOUNTER — INFUSION - HEALTHEAST (OUTPATIENT)
Dept: INFUSION THERAPY | Facility: HOSPITAL | Age: 80
End: 2020-02-04

## 2020-02-04 DIAGNOSIS — Z51.11 ENCOUNTER FOR ANTINEOPLASTIC CHEMOTHERAPY: ICD-10-CM

## 2020-02-04 DIAGNOSIS — C34.31 SMALL CELL LUNG CANCER, RIGHT LOWER LOBE (H): ICD-10-CM

## 2020-02-05 ENCOUNTER — INFUSION - HEALTHEAST (OUTPATIENT)
Dept: INFUSION THERAPY | Facility: HOSPITAL | Age: 80
End: 2020-02-05

## 2020-02-05 DIAGNOSIS — Z51.11 ENCOUNTER FOR ANTINEOPLASTIC CHEMOTHERAPY: ICD-10-CM

## 2020-02-05 DIAGNOSIS — C34.31 SMALL CELL LUNG CANCER, RIGHT LOWER LOBE (H): ICD-10-CM

## 2020-02-06 ENCOUNTER — INFUSION - HEALTHEAST (OUTPATIENT)
Dept: INFUSION THERAPY | Facility: HOSPITAL | Age: 80
End: 2020-02-06

## 2020-02-06 DIAGNOSIS — Z51.11 ENCOUNTER FOR ANTINEOPLASTIC CHEMOTHERAPY: ICD-10-CM

## 2020-02-06 DIAGNOSIS — C34.31 SMALL CELL LUNG CANCER, RIGHT LOWER LOBE (H): ICD-10-CM

## 2020-02-11 ENCOUNTER — COMMUNICATION - HEALTHEAST (OUTPATIENT)
Dept: ONCOLOGY | Facility: CLINIC | Age: 80
End: 2020-02-11

## 2020-02-24 ENCOUNTER — AMBULATORY - HEALTHEAST (OUTPATIENT)
Dept: RADIATION ONCOLOGY | Facility: HOSPITAL | Age: 80
End: 2020-02-24

## 2020-02-24 ENCOUNTER — HOSPITAL ENCOUNTER (OUTPATIENT)
Dept: CT IMAGING | Facility: HOSPITAL | Age: 80
Setting detail: RADIATION/ONCOLOGY SERIES
Discharge: STILL ADMITTED OR ADMINISTRATIVE | End: 2020-02-24
Attending: INTERNAL MEDICINE

## 2020-02-24 ENCOUNTER — OFFICE VISIT - HEALTHEAST (OUTPATIENT)
Dept: RADIATION ONCOLOGY | Facility: HOSPITAL | Age: 80
End: 2020-02-24

## 2020-02-24 ENCOUNTER — AMBULATORY - HEALTHEAST (OUTPATIENT)
Dept: ONCOLOGY | Facility: HOSPITAL | Age: 80
End: 2020-02-24

## 2020-02-24 ENCOUNTER — COMMUNICATION - HEALTHEAST (OUTPATIENT)
Dept: ADMINISTRATIVE | Facility: HOSPITAL | Age: 80
End: 2020-02-24

## 2020-02-24 DIAGNOSIS — C34.92 PRIMARY LUNG ADENOCARCINOMA, LEFT (H): ICD-10-CM

## 2020-02-24 DIAGNOSIS — C34.31 SMALL CELL LUNG CANCER, RIGHT LOWER LOBE (H): ICD-10-CM

## 2020-02-24 LAB
CREAT BLD-MCNC: 1.1 MG/DL (ref 0.6–1.1)
GFR SERPL CREATININE-BSD FRML MDRD: 48 ML/MIN/1.73M2

## 2020-02-25 ENCOUNTER — COMMUNICATION - HEALTHEAST (OUTPATIENT)
Dept: ONCOLOGY | Facility: CLINIC | Age: 80
End: 2020-02-25

## 2020-02-28 ENCOUNTER — OFFICE VISIT - HEALTHEAST (OUTPATIENT)
Dept: ONCOLOGY | Facility: HOSPITAL | Age: 80
End: 2020-02-28

## 2020-02-28 DIAGNOSIS — C34.31 SMALL CELL LUNG CANCER, RIGHT LOWER LOBE (H): ICD-10-CM

## 2020-02-28 DIAGNOSIS — C34.92 PRIMARY LUNG ADENOCARCINOMA, LEFT (H): ICD-10-CM

## 2020-03-02 ENCOUNTER — OFFICE VISIT - HEALTHEAST (OUTPATIENT)
Dept: RADIATION ONCOLOGY | Facility: HOSPITAL | Age: 80
End: 2020-03-02

## 2020-03-02 ENCOUNTER — AMBULATORY - HEALTHEAST (OUTPATIENT)
Dept: RADIATION ONCOLOGY | Facility: HOSPITAL | Age: 80
End: 2020-03-02

## 2020-03-02 DIAGNOSIS — C34.92 PRIMARY LUNG ADENOCARCINOMA, LEFT (H): ICD-10-CM

## 2020-03-02 DIAGNOSIS — C34.31 SMALL CELL LUNG CANCER, RIGHT LOWER LOBE (H): ICD-10-CM

## 2020-03-04 ENCOUNTER — HOSPITAL ENCOUNTER (OUTPATIENT)
Dept: PET IMAGING | Facility: HOSPITAL | Age: 80
Setting detail: RADIATION/ONCOLOGY SERIES
Discharge: STILL ADMITTED OR ADMINISTRATIVE | End: 2020-03-04
Attending: RADIOLOGY

## 2020-03-04 DIAGNOSIS — C34.31 SMALL CELL LUNG CANCER, RIGHT LOWER LOBE (H): ICD-10-CM

## 2020-03-04 DIAGNOSIS — C34.92 PRIMARY LUNG ADENOCARCINOMA, LEFT (H): ICD-10-CM

## 2020-03-04 LAB — GLUCOSE BLDC GLUCOMTR-MCNC: 91 MG/DL (ref 70–139)

## 2020-03-09 ENCOUNTER — OFFICE VISIT - HEALTHEAST (OUTPATIENT)
Dept: RADIATION ONCOLOGY | Facility: HOSPITAL | Age: 80
End: 2020-03-09

## 2020-03-09 ENCOUNTER — AMBULATORY - HEALTHEAST (OUTPATIENT)
Dept: RADIATION ONCOLOGY | Facility: HOSPITAL | Age: 80
End: 2020-03-09

## 2020-03-09 DIAGNOSIS — C34.31 SMALL CELL LUNG CANCER, RIGHT LOWER LOBE (H): ICD-10-CM

## 2020-03-09 DIAGNOSIS — C34.92 PRIMARY LUNG ADENOCARCINOMA, LEFT (H): ICD-10-CM

## 2020-03-11 ENCOUNTER — HEALTH MAINTENANCE LETTER (OUTPATIENT)
Age: 80
End: 2020-03-11

## 2020-03-18 ENCOUNTER — AMBULATORY - HEALTHEAST (OUTPATIENT)
Dept: RADIATION ONCOLOGY | Facility: HOSPITAL | Age: 80
End: 2020-03-18

## 2020-03-23 ENCOUNTER — OFFICE VISIT - HEALTHEAST (OUTPATIENT)
Dept: RADIATION ONCOLOGY | Facility: HOSPITAL | Age: 80
End: 2020-03-23

## 2020-03-23 ENCOUNTER — AMBULATORY - HEALTHEAST (OUTPATIENT)
Dept: RADIATION ONCOLOGY | Facility: HOSPITAL | Age: 80
End: 2020-03-23

## 2020-03-23 DIAGNOSIS — C34.31 SMALL CELL LUNG CANCER, RIGHT LOWER LOBE (H): ICD-10-CM

## 2020-03-25 ENCOUNTER — AMBULATORY - HEALTHEAST (OUTPATIENT)
Dept: RADIATION ONCOLOGY | Facility: HOSPITAL | Age: 80
End: 2020-03-25

## 2020-03-27 ENCOUNTER — AMBULATORY - HEALTHEAST (OUTPATIENT)
Dept: RADIATION ONCOLOGY | Facility: HOSPITAL | Age: 80
End: 2020-03-27

## 2020-03-30 ENCOUNTER — AMBULATORY - HEALTHEAST (OUTPATIENT)
Dept: RADIATION ONCOLOGY | Facility: HOSPITAL | Age: 80
End: 2020-03-30

## 2020-04-01 ENCOUNTER — AMBULATORY - HEALTHEAST (OUTPATIENT)
Dept: RADIATION ONCOLOGY | Facility: HOSPITAL | Age: 80
End: 2020-04-01

## 2020-04-01 DIAGNOSIS — C34.92 PRIMARY LUNG ADENOCARCINOMA, LEFT (H): ICD-10-CM

## 2020-04-01 DIAGNOSIS — C34.31 SMALL CELL LUNG CANCER, RIGHT LOWER LOBE (H): ICD-10-CM

## 2020-04-13 ENCOUNTER — COMMUNICATION - HEALTHEAST (OUTPATIENT)
Dept: RADIATION ONCOLOGY | Facility: HOSPITAL | Age: 80
End: 2020-04-13

## 2020-06-08 ENCOUNTER — HOSPITAL ENCOUNTER (OUTPATIENT)
Dept: CT IMAGING | Facility: CLINIC | Age: 80
Discharge: HOME | End: 2020-06-08
Attending: RADIOLOGY

## 2020-06-08 DIAGNOSIS — C34.92 PRIMARY LUNG ADENOCARCINOMA, LEFT (H): ICD-10-CM

## 2020-06-08 DIAGNOSIS — C34.31 SMALL CELL LUNG CANCER, RIGHT LOWER LOBE (H): ICD-10-CM

## 2020-06-09 ENCOUNTER — OFFICE VISIT - HEALTHEAST (OUTPATIENT)
Dept: RADIATION ONCOLOGY | Facility: CLINIC | Age: 80
End: 2020-06-09

## 2020-06-09 DIAGNOSIS — C34.92 PRIMARY LUNG ADENOCARCINOMA, LEFT (H): ICD-10-CM

## 2020-06-09 DIAGNOSIS — C34.31 SMALL CELL LUNG CANCER, RIGHT LOWER LOBE (H): ICD-10-CM

## 2020-06-14 PROBLEM — C34.90 LUNG CANCER (H): Status: ACTIVE | Noted: 2019-07-08

## 2020-06-18 ENCOUNTER — HOSPITAL ENCOUNTER (OUTPATIENT)
Dept: MAMMOGRAPHY | Facility: CLINIC | Age: 80
Discharge: HOME | End: 2020-06-18

## 2020-06-18 DIAGNOSIS — Z12.31 SCREENING MAMMOGRAM, ENCOUNTER FOR: ICD-10-CM

## 2020-06-24 ENCOUNTER — AMBULATORY - HEALTHEAST (OUTPATIENT)
Dept: INFUSION THERAPY | Facility: HOSPITAL | Age: 80
End: 2020-06-24

## 2020-06-24 DIAGNOSIS — C34.31 SMALL CELL LUNG CANCER, RIGHT LOWER LOBE (H): ICD-10-CM

## 2020-06-24 LAB
ALBUMIN SERPL-MCNC: 3.7 G/DL (ref 3.5–5)
ALP SERPL-CCNC: 126 U/L (ref 45–120)
ALT SERPL W P-5'-P-CCNC: <9 U/L (ref 0–45)
ANION GAP SERPL CALCULATED.3IONS-SCNC: 8 MMOL/L (ref 5–18)
AST SERPL W P-5'-P-CCNC: 14 U/L (ref 0–40)
BASOPHILS # BLD AUTO: 0 THOU/UL (ref 0–0.2)
BASOPHILS NFR BLD AUTO: 0 % (ref 0–2)
BILIRUB SERPL-MCNC: 0.3 MG/DL (ref 0–1)
BUN SERPL-MCNC: 22 MG/DL (ref 8–28)
CALCIUM SERPL-MCNC: 9 MG/DL (ref 8.5–10.5)
CHLORIDE BLD-SCNC: 107 MMOL/L (ref 98–107)
CO2 SERPL-SCNC: 28 MMOL/L (ref 22–31)
CREAT SERPL-MCNC: 1.21 MG/DL (ref 0.6–1.1)
EOSINOPHIL # BLD AUTO: 0.1 THOU/UL (ref 0–0.4)
EOSINOPHIL NFR BLD AUTO: 2 % (ref 0–6)
ERYTHROCYTE [DISTWIDTH] IN BLOOD BY AUTOMATED COUNT: 18.2 % (ref 11–14.5)
GFR SERPL CREATININE-BSD FRML MDRD: 43 ML/MIN/1.73M2
GLUCOSE BLD-MCNC: 75 MG/DL (ref 70–125)
HCT VFR BLD AUTO: 33.7 % (ref 35–47)
HGB BLD-MCNC: 9.8 G/DL (ref 12–16)
LYMPHOCYTES # BLD AUTO: 1.3 THOU/UL (ref 0.8–4.4)
LYMPHOCYTES NFR BLD AUTO: 22 % (ref 20–40)
MAGNESIUM SERPL-MCNC: 2 MG/DL (ref 1.8–2.6)
MCH RBC QN AUTO: 22.7 PG (ref 27–34)
MCHC RBC AUTO-ENTMCNC: 29.1 G/DL (ref 32–36)
MCV RBC AUTO: 78 FL (ref 80–100)
MONOCYTES # BLD AUTO: 0.9 THOU/UL (ref 0–0.9)
MONOCYTES NFR BLD AUTO: 14 % (ref 2–10)
NEUTROPHILS # BLD AUTO: 3.9 THOU/UL (ref 2–7.7)
NEUTROPHILS NFR BLD AUTO: 62 % (ref 50–70)
PLATELET # BLD AUTO: 251 THOU/UL (ref 140–440)
PMV BLD AUTO: 8.9 FL (ref 8.5–12.5)
POTASSIUM BLD-SCNC: 4 MMOL/L (ref 3.5–5)
PROT SERPL-MCNC: 7 G/DL (ref 6–8)
RBC # BLD AUTO: 4.31 MILL/UL (ref 3.8–5.4)
SODIUM SERPL-SCNC: 143 MMOL/L (ref 136–145)
WBC: 6.2 THOU/UL (ref 4–11)

## 2020-06-25 ENCOUNTER — OFFICE VISIT - HEALTHEAST (OUTPATIENT)
Dept: ONCOLOGY | Facility: HOSPITAL | Age: 80
End: 2020-06-25

## 2020-06-25 DIAGNOSIS — D50.9 MICROCYTIC ANEMIA: ICD-10-CM

## 2020-06-25 DIAGNOSIS — C34.92 PRIMARY LUNG ADENOCARCINOMA, LEFT (H): ICD-10-CM

## 2020-06-25 DIAGNOSIS — C34.31 SMALL CELL LUNG CANCER, RIGHT LOWER LOBE (H): ICD-10-CM

## 2020-06-26 ENCOUNTER — COMMUNICATION - HEALTHEAST (OUTPATIENT)
Dept: ONCOLOGY | Facility: CLINIC | Age: 80
End: 2020-06-26

## 2020-06-29 ENCOUNTER — COMMUNICATION - HEALTHEAST (OUTPATIENT)
Dept: ONCOLOGY | Facility: CLINIC | Age: 80
End: 2020-06-29

## 2020-07-13 ENCOUNTER — COMMUNICATION - HEALTHEAST (OUTPATIENT)
Dept: CARDIOLOGY | Facility: CLINIC | Age: 80
End: 2020-07-13

## 2020-07-13 DIAGNOSIS — I48.0 PAROXYSMAL ATRIAL FIBRILLATION (H): ICD-10-CM

## 2021-01-01 ENCOUNTER — RECORDS - HEALTHEAST (OUTPATIENT)
Dept: ADMINISTRATIVE | Facility: CLINIC | Age: 81
End: 2021-01-01

## 2021-01-01 ENCOUNTER — RECORDS - HEALTHEAST (OUTPATIENT)
Dept: ADMINISTRATIVE | Facility: OTHER | Age: 81
End: 2021-01-01

## 2021-01-01 ENCOUNTER — RECORDS - HEALTHEAST (OUTPATIENT)
Dept: SCHEDULING | Facility: CLINIC | Age: 81
End: 2021-01-01

## 2021-01-01 ENCOUNTER — HOSPITAL ENCOUNTER (OUTPATIENT)
Dept: CT IMAGING | Facility: HOSPITAL | Age: 81
Setting detail: RADIATION/ONCOLOGY SERIES
Discharge: STILL ADMITTED OR ADMINISTRATIVE | End: 2021-06-14
Attending: INTERNAL MEDICINE
Payer: COMMERCIAL

## 2021-01-01 ENCOUNTER — ONCOLOGY VISIT (OUTPATIENT)
Dept: ONCOLOGY | Facility: HOSPITAL | Age: 81
End: 2021-01-01
Payer: COMMERCIAL

## 2021-01-01 ENCOUNTER — COMMUNICATION - HEALTHEAST (OUTPATIENT)
Dept: ONCOLOGY | Facility: HOSPITAL | Age: 81
End: 2021-01-01

## 2021-01-01 ENCOUNTER — APPOINTMENT (OUTPATIENT)
Dept: MRI IMAGING | Facility: HOSPITAL | Age: 81
End: 2021-01-01
Attending: EMERGENCY MEDICINE
Payer: COMMERCIAL

## 2021-01-01 ENCOUNTER — OFFICE VISIT (OUTPATIENT)
Dept: CARDIOLOGY | Facility: CLINIC | Age: 81
End: 2021-01-01
Payer: COMMERCIAL

## 2021-01-01 ENCOUNTER — OFFICE VISIT (OUTPATIENT)
Dept: RADIATION ONCOLOGY | Facility: CLINIC | Age: 81
End: 2021-01-01
Attending: RADIOLOGY
Payer: COMMERCIAL

## 2021-01-01 ENCOUNTER — RECORDS - HEALTHEAST (OUTPATIENT)
Dept: LAB | Facility: CLINIC | Age: 81
End: 2021-01-01

## 2021-01-01 ENCOUNTER — OFFICE VISIT - HEALTHEAST (OUTPATIENT)
Dept: RADIATION ONCOLOGY | Facility: HOSPITAL | Age: 81
End: 2021-01-01

## 2021-01-01 ENCOUNTER — HOSPITAL ENCOUNTER (OUTPATIENT)
Dept: CT IMAGING | Facility: HOSPITAL | Age: 81
Setting detail: RADIATION/ONCOLOGY SERIES
Discharge: STILL ADMITTED OR ADMINISTRATIVE | End: 2021-07-08
Attending: INTERNAL MEDICINE
Payer: COMMERCIAL

## 2021-01-01 ENCOUNTER — COMMUNICATION - HEALTHEAST (OUTPATIENT)
Dept: SCHEDULING | Facility: CLINIC | Age: 81
End: 2021-01-01

## 2021-01-01 ENCOUNTER — HEALTH MAINTENANCE LETTER (OUTPATIENT)
Age: 81
End: 2021-01-01

## 2021-01-01 ENCOUNTER — HOSPITAL ENCOUNTER (OUTPATIENT)
Dept: CT IMAGING | Facility: HOSPITAL | Age: 81
Setting detail: RADIATION/ONCOLOGY SERIES
Discharge: STILL ADMITTED OR ADMINISTRATIVE | End: 2021-02-16
Attending: RADIOLOGY

## 2021-01-01 ENCOUNTER — TELEPHONE (OUTPATIENT)
Dept: ONCOLOGY | Facility: HOSPITAL | Age: 81
End: 2021-01-01

## 2021-01-01 ENCOUNTER — PATIENT OUTREACH (OUTPATIENT)
Dept: CARE COORDINATION | Facility: CLINIC | Age: 81
End: 2021-01-01

## 2021-01-01 ENCOUNTER — AMBULATORY - HEALTHEAST (OUTPATIENT)
Dept: ONCOLOGY | Facility: HOSPITAL | Age: 81
End: 2021-01-01

## 2021-01-01 ENCOUNTER — HOSPITAL ENCOUNTER (OUTPATIENT)
Dept: RADIOLOGY | Facility: HOSPITAL | Age: 81
Discharge: HOME | End: 2021-07-08
Attending: RADIOLOGY
Payer: COMMERCIAL

## 2021-01-01 ENCOUNTER — OFFICE VISIT - HEALTHEAST (OUTPATIENT)
Dept: ONCOLOGY | Facility: HOSPITAL | Age: 81
End: 2021-01-01

## 2021-01-01 ENCOUNTER — HOSPITAL ENCOUNTER (EMERGENCY)
Facility: HOSPITAL | Age: 81
Discharge: HOME | End: 2021-07-27
Attending: EMERGENCY MEDICINE | Admitting: EMERGENCY MEDICINE
Payer: COMMERCIAL

## 2021-01-01 ENCOUNTER — HOSPITAL ENCOUNTER (OUTPATIENT)
Dept: CARDIOLOGY | Facility: HOSPITAL | Age: 81
Discharge: HOME | End: 2021-07-21
Attending: INTERNAL MEDICINE | Admitting: INTERNAL MEDICINE
Payer: COMMERCIAL

## 2021-01-01 ENCOUNTER — AMBULATORY - HEALTHEAST (OUTPATIENT)
Dept: NURSING | Facility: CLINIC | Age: 81
End: 2021-01-01

## 2021-01-01 ENCOUNTER — LAB REQUISITION (OUTPATIENT)
Dept: LAB | Facility: CLINIC | Age: 81
End: 2021-01-01

## 2021-01-01 ENCOUNTER — APPOINTMENT (OUTPATIENT)
Dept: CT IMAGING | Facility: HOSPITAL | Age: 81
End: 2021-01-01
Attending: EMERGENCY MEDICINE
Payer: COMMERCIAL

## 2021-01-01 ENCOUNTER — TELEPHONE (OUTPATIENT)
Dept: NUTRITION | Facility: HOSPITAL | Age: 81
End: 2021-01-01

## 2021-01-01 ENCOUNTER — AMBULATORY - HEALTHEAST (OUTPATIENT)
Dept: INFUSION THERAPY | Facility: HOSPITAL | Age: 81
End: 2021-01-01

## 2021-01-01 VITALS — BODY MASS INDEX: 29.84 KG/M2 | WEIGHT: 148 LBS | HEIGHT: 59 IN

## 2021-01-01 VITALS
DIASTOLIC BLOOD PRESSURE: 56 MMHG | TEMPERATURE: 97.7 F | WEIGHT: 140.2 LBS | SYSTOLIC BLOOD PRESSURE: 118 MMHG | HEART RATE: 60 BPM | BODY MASS INDEX: 28.32 KG/M2 | OXYGEN SATURATION: 98 %

## 2021-01-01 VITALS
HEIGHT: 59 IN | DIASTOLIC BLOOD PRESSURE: 70 MMHG | RESPIRATION RATE: 12 BRPM | SYSTOLIC BLOOD PRESSURE: 106 MMHG | HEART RATE: 56 BPM | BODY MASS INDEX: 30.1 KG/M2 | WEIGHT: 149.3 LBS

## 2021-01-01 VITALS
OXYGEN SATURATION: 98 % | TEMPERATURE: 97.6 F | DIASTOLIC BLOOD PRESSURE: 64 MMHG | SYSTOLIC BLOOD PRESSURE: 140 MMHG | HEART RATE: 58 BPM

## 2021-01-01 VITALS
WEIGHT: 144.7 LBS | SYSTOLIC BLOOD PRESSURE: 115 MMHG | TEMPERATURE: 97.8 F | OXYGEN SATURATION: 100 % | HEART RATE: 54 BPM | DIASTOLIC BLOOD PRESSURE: 57 MMHG | BODY MASS INDEX: 29.23 KG/M2

## 2021-01-01 VITALS
RESPIRATION RATE: 16 BRPM | DIASTOLIC BLOOD PRESSURE: 70 MMHG | SYSTOLIC BLOOD PRESSURE: 122 MMHG | HEART RATE: 56 BPM | HEIGHT: 59 IN | BODY MASS INDEX: 29.43 KG/M2 | WEIGHT: 146 LBS

## 2021-01-01 VITALS
WEIGHT: 147.9 LBS | BODY MASS INDEX: 29.82 KG/M2 | HEART RATE: 75 BPM | DIASTOLIC BLOOD PRESSURE: 62 MMHG | TEMPERATURE: 97.9 F | SYSTOLIC BLOOD PRESSURE: 126 MMHG | OXYGEN SATURATION: 95 % | HEIGHT: 59 IN

## 2021-01-01 VITALS
HEART RATE: 74 BPM | SYSTOLIC BLOOD PRESSURE: 95 MMHG | TEMPERATURE: 97.7 F | RESPIRATION RATE: 16 BRPM | OXYGEN SATURATION: 95 % | DIASTOLIC BLOOD PRESSURE: 53 MMHG | WEIGHT: 147 LBS | BODY MASS INDEX: 29.69 KG/M2

## 2021-01-01 VITALS — HEART RATE: 58 BPM | DIASTOLIC BLOOD PRESSURE: 61 MMHG | SYSTOLIC BLOOD PRESSURE: 121 MMHG | OXYGEN SATURATION: 100 %

## 2021-01-01 VITALS
TEMPERATURE: 97.8 F | OXYGEN SATURATION: 100 % | DIASTOLIC BLOOD PRESSURE: 46 MMHG | SYSTOLIC BLOOD PRESSURE: 107 MMHG | HEART RATE: 52 BPM

## 2021-01-01 VITALS
TEMPERATURE: 32.7 F | HEART RATE: 55 BPM | DIASTOLIC BLOOD PRESSURE: 61 MMHG | SYSTOLIC BLOOD PRESSURE: 144 MMHG | OXYGEN SATURATION: 98 %

## 2021-01-01 VITALS
SYSTOLIC BLOOD PRESSURE: 120 MMHG | TEMPERATURE: 97.8 F | HEART RATE: 58 BPM | DIASTOLIC BLOOD PRESSURE: 58 MMHG | BODY MASS INDEX: 29.49 KG/M2 | OXYGEN SATURATION: 98 % | WEIGHT: 144.8 LBS

## 2021-01-01 VITALS
DIASTOLIC BLOOD PRESSURE: 65 MMHG | HEART RATE: 67 BPM | TEMPERATURE: 97.6 F | OXYGEN SATURATION: 98 % | SYSTOLIC BLOOD PRESSURE: 123 MMHG

## 2021-01-01 VITALS
SYSTOLIC BLOOD PRESSURE: 122 MMHG | TEMPERATURE: 97.1 F | DIASTOLIC BLOOD PRESSURE: 58 MMHG | HEART RATE: 58 BPM | OXYGEN SATURATION: 98 %

## 2021-01-01 VITALS
SYSTOLIC BLOOD PRESSURE: 112 MMHG | WEIGHT: 141.7 LBS | HEART RATE: 82 BPM | DIASTOLIC BLOOD PRESSURE: 62 MMHG | BODY MASS INDEX: 28.56 KG/M2 | OXYGEN SATURATION: 99 % | HEIGHT: 59 IN | TEMPERATURE: 98.3 F

## 2021-01-01 VITALS
OXYGEN SATURATION: 100 % | SYSTOLIC BLOOD PRESSURE: 121 MMHG | HEART RATE: 68 BPM | DIASTOLIC BLOOD PRESSURE: 51 MMHG | TEMPERATURE: 97.5 F

## 2021-01-01 VITALS
HEART RATE: 56 BPM | DIASTOLIC BLOOD PRESSURE: 55 MMHG | TEMPERATURE: 97.5 F | OXYGEN SATURATION: 100 % | SYSTOLIC BLOOD PRESSURE: 114 MMHG

## 2021-01-01 VITALS
TEMPERATURE: 98.8 F | DIASTOLIC BLOOD PRESSURE: 54 MMHG | HEART RATE: 56 BPM | RESPIRATION RATE: 18 BRPM | SYSTOLIC BLOOD PRESSURE: 120 MMHG | OXYGEN SATURATION: 100 %

## 2021-01-01 VITALS
TEMPERATURE: 97.6 F | HEART RATE: 64 BPM | SYSTOLIC BLOOD PRESSURE: 122 MMHG | OXYGEN SATURATION: 98 % | WEIGHT: 146.2 LBS | HEIGHT: 59 IN | BODY MASS INDEX: 29.48 KG/M2 | DIASTOLIC BLOOD PRESSURE: 60 MMHG

## 2021-01-01 VITALS
OXYGEN SATURATION: 96 % | TEMPERATURE: 97.7 F | SYSTOLIC BLOOD PRESSURE: 119 MMHG | HEART RATE: 58 BPM | DIASTOLIC BLOOD PRESSURE: 60 MMHG

## 2021-01-01 VITALS — WEIGHT: 144.3 LBS | BODY MASS INDEX: 29.15 KG/M2

## 2021-01-01 VITALS
TEMPERATURE: 98.3 F | SYSTOLIC BLOOD PRESSURE: 116 MMHG | HEART RATE: 56 BPM | DIASTOLIC BLOOD PRESSURE: 57 MMHG | OXYGEN SATURATION: 100 % | WEIGHT: 138.2 LBS | BODY MASS INDEX: 27.91 KG/M2

## 2021-01-01 VITALS — DIASTOLIC BLOOD PRESSURE: 59 MMHG | HEART RATE: 57 BPM | OXYGEN SATURATION: 100 % | SYSTOLIC BLOOD PRESSURE: 132 MMHG

## 2021-01-01 VITALS — BODY MASS INDEX: 29.38 KG/M2 | WEIGHT: 155.6 LBS | HEIGHT: 61 IN

## 2021-01-01 VITALS — BODY MASS INDEX: 30.14 KG/M2 | WEIGHT: 149.5 LBS | HEIGHT: 59 IN

## 2021-01-01 VITALS
TEMPERATURE: 97.8 F | DIASTOLIC BLOOD PRESSURE: 52 MMHG | HEART RATE: 60 BPM | SYSTOLIC BLOOD PRESSURE: 114 MMHG | OXYGEN SATURATION: 98 %

## 2021-01-01 VITALS — BODY MASS INDEX: 28.87 KG/M2 | WEIGHT: 143.19 LBS | HEIGHT: 59 IN

## 2021-01-01 VITALS
HEIGHT: 59 IN | OXYGEN SATURATION: 97 % | HEART RATE: 71 BPM | WEIGHT: 141.8 LBS | TEMPERATURE: 97.9 F | BODY MASS INDEX: 28.59 KG/M2 | SYSTOLIC BLOOD PRESSURE: 113 MMHG | DIASTOLIC BLOOD PRESSURE: 58 MMHG

## 2021-01-01 VITALS
SYSTOLIC BLOOD PRESSURE: 130 MMHG | DIASTOLIC BLOOD PRESSURE: 70 MMHG | HEART RATE: 70 BPM | TEMPERATURE: 97.9 F | OXYGEN SATURATION: 98 %

## 2021-01-01 VITALS — WEIGHT: 155 LBS | BODY MASS INDEX: 31.25 KG/M2 | HEIGHT: 59 IN

## 2021-01-01 VITALS
TEMPERATURE: 97.6 F | HEART RATE: 64 BPM | OXYGEN SATURATION: 100 % | SYSTOLIC BLOOD PRESSURE: 132 MMHG | DIASTOLIC BLOOD PRESSURE: 43 MMHG

## 2021-01-01 VITALS — BODY MASS INDEX: 29.84 KG/M2 | HEIGHT: 59 IN | WEIGHT: 148 LBS

## 2021-01-01 VITALS
TEMPERATURE: 97.9 F | SYSTOLIC BLOOD PRESSURE: 123 MMHG | DIASTOLIC BLOOD PRESSURE: 59 MMHG | OXYGEN SATURATION: 99 % | HEART RATE: 52 BPM

## 2021-01-01 VITALS
DIASTOLIC BLOOD PRESSURE: 61 MMHG | WEIGHT: 146.4 LBS | BODY MASS INDEX: 29.57 KG/M2 | OXYGEN SATURATION: 99 % | TEMPERATURE: 98.4 F | SYSTOLIC BLOOD PRESSURE: 133 MMHG | HEART RATE: 66 BPM

## 2021-01-01 VITALS
DIASTOLIC BLOOD PRESSURE: 52 MMHG | BODY MASS INDEX: 28.86 KG/M2 | SYSTOLIC BLOOD PRESSURE: 128 MMHG | TEMPERATURE: 97.6 F | HEART RATE: 58 BPM | OXYGEN SATURATION: 100 % | WEIGHT: 142.9 LBS

## 2021-01-01 VITALS
BODY MASS INDEX: 29.64 KG/M2 | TEMPERATURE: 98.7 F | RESPIRATION RATE: 21 BRPM | DIASTOLIC BLOOD PRESSURE: 58 MMHG | HEART RATE: 48 BPM | OXYGEN SATURATION: 98 % | SYSTOLIC BLOOD PRESSURE: 120 MMHG | WEIGHT: 147 LBS | HEIGHT: 59 IN

## 2021-01-01 VITALS
HEART RATE: 54 BPM | OXYGEN SATURATION: 97 % | TEMPERATURE: 97.9 F | WEIGHT: 160.8 LBS | BODY MASS INDEX: 32.48 KG/M2 | SYSTOLIC BLOOD PRESSURE: 156 MMHG | DIASTOLIC BLOOD PRESSURE: 68 MMHG

## 2021-01-01 VITALS
HEIGHT: 59 IN | DIASTOLIC BLOOD PRESSURE: 67 MMHG | SYSTOLIC BLOOD PRESSURE: 101 MMHG | BODY MASS INDEX: 28.1 KG/M2 | HEART RATE: 60 BPM | TEMPERATURE: 97.7 F | WEIGHT: 139.4 LBS | OXYGEN SATURATION: 100 %

## 2021-01-01 VITALS
RESPIRATION RATE: 16 BRPM | BODY MASS INDEX: 30.24 KG/M2 | WEIGHT: 150 LBS | HEIGHT: 59 IN | DIASTOLIC BLOOD PRESSURE: 56 MMHG | SYSTOLIC BLOOD PRESSURE: 112 MMHG | HEART RATE: 56 BPM

## 2021-01-01 VITALS — BODY MASS INDEX: 29.35 KG/M2 | WEIGHT: 149.5 LBS | HEIGHT: 60 IN

## 2021-01-01 VITALS
DIASTOLIC BLOOD PRESSURE: 63 MMHG | HEART RATE: 57 BPM | SYSTOLIC BLOOD PRESSURE: 143 MMHG | WEIGHT: 150.8 LBS | OXYGEN SATURATION: 98 % | BODY MASS INDEX: 30.46 KG/M2

## 2021-01-01 VITALS — WEIGHT: 148.2 LBS | BODY MASS INDEX: 29.88 KG/M2 | HEIGHT: 59 IN

## 2021-01-01 VITALS — WEIGHT: 147.4 LBS | BODY MASS INDEX: 28.94 KG/M2 | HEIGHT: 60 IN

## 2021-01-01 DIAGNOSIS — D50.9 MICROCYTIC ANEMIA: ICD-10-CM

## 2021-01-01 DIAGNOSIS — C34.31 SMALL CELL LUNG CANCER, RIGHT LOWER LOBE (H): ICD-10-CM

## 2021-01-01 DIAGNOSIS — R63.0 POOR APPETITE: ICD-10-CM

## 2021-01-01 DIAGNOSIS — Z12.31 OTHER SCREENING MAMMOGRAM: ICD-10-CM

## 2021-01-01 DIAGNOSIS — I10 ESSENTIAL HYPERTENSION: ICD-10-CM

## 2021-01-01 DIAGNOSIS — R68.89 OTHER GENERAL SYMPTOMS AND SIGNS: ICD-10-CM

## 2021-01-01 DIAGNOSIS — I48.0 PAROXYSMAL ATRIAL FIBRILLATION (H): ICD-10-CM

## 2021-01-01 DIAGNOSIS — R00.1 BRADYCARDIA: Primary | ICD-10-CM

## 2021-01-01 DIAGNOSIS — C34.32 MALIGNANT NEOPLASM OF LOWER LOBE OF LEFT LUNG (H): ICD-10-CM

## 2021-01-01 DIAGNOSIS — C34.92 PRIMARY LUNG ADENOCARCINOMA, LEFT (H): ICD-10-CM

## 2021-01-01 DIAGNOSIS — C34.32 MALIGNANT NEOPLASM OF LOWER LOBE OF LEFT LUNG (H): Primary | ICD-10-CM

## 2021-01-01 DIAGNOSIS — R00.1 BRADYCARDIA: ICD-10-CM

## 2021-01-01 DIAGNOSIS — F33.1 MAJOR DEPRESSIVE DISORDER, RECURRENT, MODERATE (H): ICD-10-CM

## 2021-01-01 DIAGNOSIS — Z11.59 ENCOUNTER FOR SCREENING FOR OTHER VIRAL DISEASES: ICD-10-CM

## 2021-01-01 DIAGNOSIS — R91.1 NODULE OF LOWER LOBE OF LEFT LUNG: ICD-10-CM

## 2021-01-01 DIAGNOSIS — Z12.31 VISIT FOR SCREENING MAMMOGRAM: ICD-10-CM

## 2021-01-01 DIAGNOSIS — R90.0 INTRACRANIAL MASS: ICD-10-CM

## 2021-01-01 DIAGNOSIS — C79.31 BRAIN METASTASIS: Primary | ICD-10-CM

## 2021-01-01 DIAGNOSIS — M62.81 GENERALIZED MUSCLE WEAKNESS: ICD-10-CM

## 2021-01-01 DIAGNOSIS — R11.0 NAUSEA: ICD-10-CM

## 2021-01-01 LAB
ALBUMIN SERPL-MCNC: 3.7 G/DL (ref 3.5–5)
ALBUMIN SERPL-MCNC: 4.1 G/DL (ref 3.5–5)
ALP SERPL-CCNC: 89 U/L (ref 45–120)
ALP SERPL-CCNC: 95 U/L (ref 45–120)
ALT SERPL W P-5'-P-CCNC: 11 U/L (ref 0–45)
ALT SERPL W P-5'-P-CCNC: 14 U/L (ref 0–45)
ANION GAP SERPL CALCULATED.3IONS-SCNC: 13 MMOL/L (ref 5–18)
ANION GAP SERPL CALCULATED.3IONS-SCNC: 14 MMOL/L (ref 5–18)
ANION GAP SERPL CALCULATED.3IONS-SCNC: 8 MMOL/L (ref 5–18)
AST SERPL W P-5'-P-CCNC: 11 U/L (ref 0–40)
AST SERPL W P-5'-P-CCNC: 20 U/L (ref 0–40)
ATRIAL RATE - MUSE: 357 BPM
ATRIAL RATE - MUSE: 46 BPM
BASOPHILS # BLD AUTO: 0 10E3/UL (ref 0–0.2)
BASOPHILS # BLD AUTO: 0 THOU/UL (ref 0–0.2)
BASOPHILS NFR BLD AUTO: 0 % (ref 0–2)
BASOPHILS NFR BLD AUTO: 1 %
BILIRUB SERPL-MCNC: 0.8 MG/DL (ref 0–1)
BILIRUB SERPL-MCNC: 1.5 MG/DL (ref 0–1)
BUN SERPL-MCNC: 20 MG/DL (ref 8–28)
BUN SERPL-MCNC: 21 MG/DL (ref 8–28)
BUN SERPL-MCNC: 24 MG/DL (ref 8–28)
CALCIUM SERPL-MCNC: 10 MG/DL (ref 8.5–10.5)
CALCIUM SERPL-MCNC: 9 MG/DL (ref 8.5–10.5)
CALCIUM SERPL-MCNC: 9.1 MG/DL (ref 8.5–10.5)
CAP COMMENT: ABNORMAL
CHLORIDE BLD-SCNC: 103 MMOL/L (ref 98–107)
CHLORIDE BLD-SCNC: 104 MMOL/L (ref 98–107)
CHLORIDE BLD-SCNC: 107 MMOL/L (ref 98–107)
CO2 SERPL-SCNC: 26 MMOL/L (ref 22–31)
CO2 SERPL-SCNC: 27 MMOL/L (ref 22–31)
CO2 SERPL-SCNC: 29 MMOL/L (ref 22–31)
CREAT SERPL-MCNC: 1.17 MG/DL (ref 0.6–1.1)
CREAT SERPL-MCNC: 1.32 MG/DL (ref 0.6–1.1)
CREAT SERPL-MCNC: 1.51 MG/DL (ref 0.6–1.1)
DIASTOLIC BLOOD PRESSURE - MUSE: 65 MMHG
DIASTOLIC BLOOD PRESSURE - MUSE: NORMAL MMHG
EOSINOPHIL # BLD AUTO: 0 10E3/UL (ref 0–0.7)
EOSINOPHIL # BLD AUTO: 0.1 THOU/UL (ref 0–0.4)
EOSINOPHIL NFR BLD AUTO: 1 %
EOSINOPHIL NFR BLD AUTO: 1 % (ref 0–6)
ERYTHROCYTE [DISTWIDTH] IN BLOOD BY AUTOMATED COUNT: 13 % (ref 11–14.5)
ERYTHROCYTE [DISTWIDTH] IN BLOOD BY AUTOMATED COUNT: 13.1 % (ref 10–15)
GFR SERPL CREATININE-BSD FRML MDRD: 32 ML/MIN/1.73M2
GFR SERPL CREATININE-BSD FRML MDRD: 39 ML/MIN/1.73M2
GFR SERPL CREATININE-BSD FRML MDRD: 44 ML/MIN/1.73M2
GLUCOSE BLD-MCNC: 126 MG/DL (ref 70–125)
GLUCOSE BLD-MCNC: 134 MG/DL (ref 70–125)
GLUCOSE BLD-MCNC: 88 MG/DL (ref 70–125)
HCT VFR BLD AUTO: 40.9 % (ref 35–47)
HCT VFR BLD AUTO: 44.6 % (ref 35–47)
HGB BLD-MCNC: 13.4 G/DL (ref 12–16)
HGB BLD-MCNC: 15.2 G/DL (ref 11.7–15.7)
HOLD SPECIMEN: NORMAL
IMM GRANULOCYTES # BLD: 0 10E3/UL
IMM GRANULOCYTES # BLD: 0 THOU/UL
IMM GRANULOCYTES NFR BLD: 0 %
IMM GRANULOCYTES NFR BLD: 0 %
INR PPP: 1.02 (ref 0.9–1.1)
INTERPRETATION ECG - MUSE: NORMAL
INTERPRETATION ECG - MUSE: NORMAL
LAB AP CHARGES (HE HISTORICAL CONVERSION): ABNORMAL
LAB AP INITIAL CYTO EVAL (HE HISTORICAL CONVERSION): ABNORMAL
LAB MED GENERAL PATH INTERP (HE HISTORICAL CONVERSION): ABNORMAL
LYMPHOCYTES # BLD AUTO: 1.6 THOU/UL (ref 0.8–4.4)
LYMPHOCYTES # BLD AUTO: 2.2 10E3/UL (ref 0.8–5.3)
LYMPHOCYTES NFR BLD AUTO: 23 % (ref 20–40)
LYMPHOCYTES NFR BLD AUTO: 36 %
MAGNESIUM SERPL-MCNC: 2.1 MG/DL (ref 1.8–2.6)
MCH RBC QN AUTO: 31.7 PG (ref 26.5–33)
MCH RBC QN AUTO: 32 PG (ref 27–34)
MCHC RBC AUTO-ENTMCNC: 32.8 G/DL (ref 32–36)
MCHC RBC AUTO-ENTMCNC: 34.1 G/DL (ref 31.5–36.5)
MCV RBC AUTO: 93 FL (ref 78–100)
MCV RBC AUTO: 98 FL (ref 80–100)
MONOCYTES # BLD AUTO: 0.6 10E3/UL (ref 0–1.3)
MONOCYTES # BLD AUTO: 0.7 THOU/UL (ref 0–0.9)
MONOCYTES NFR BLD AUTO: 10 %
MONOCYTES NFR BLD AUTO: 9 % (ref 2–10)
NEUTROPHILS # BLD AUTO: 3.2 10E3/UL (ref 1.6–8.3)
NEUTROPHILS # BLD AUTO: 4.6 THOU/UL (ref 2–7.7)
NEUTROPHILS NFR BLD AUTO: 52 %
NEUTROPHILS NFR BLD AUTO: 66 % (ref 50–70)
NRBC # BLD AUTO: 0 10E3/UL
NRBC BLD AUTO-RTO: 0 /100
P AXIS - MUSE: 39 DEGREES
P AXIS - MUSE: NORMAL DEGREES
PATH REPORT.COMMENTS IMP SPEC: ABNORMAL
PATH REPORT.COMMENTS IMP SPEC: ABNORMAL
PATH REPORT.FINAL DX SPEC: ABNORMAL
PATH REPORT.MICROSCOPIC SPEC OTHER STN: ABNORMAL
PATH REPORT.RELEVANT HX SPEC: ABNORMAL
PLATELET # BLD AUTO: 175 THOU/UL (ref 140–440)
PLATELET # BLD AUTO: 193 10E3/UL (ref 150–450)
PMV BLD AUTO: 9.5 FL (ref 8.5–12.5)
POTASSIUM BLD-SCNC: 3 MMOL/L (ref 3.5–5)
POTASSIUM BLD-SCNC: 3.4 MMOL/L (ref 3.5–5)
POTASSIUM BLD-SCNC: 3.9 MMOL/L (ref 3.5–5)
PR INTERVAL - MUSE: 150 MS
PR INTERVAL - MUSE: NORMAL MS
PROT SERPL-MCNC: 6.7 G/DL (ref 6–8)
PROT SERPL-MCNC: 7.1 G/DL (ref 6–8)
QRS DURATION - MUSE: 74 MS
QRS DURATION - MUSE: 74 MS
QT - MUSE: 414 MS
QT - MUSE: 492 MS
QTC - MUSE: 430 MS
QTC - MUSE: 483 MS
R AXIS - MUSE: -17 DEGREES
R AXIS - MUSE: -22 DEGREES
RBC # BLD AUTO: 4.19 MILL/UL (ref 3.8–5.4)
RBC # BLD AUTO: 4.79 10E6/UL (ref 3.8–5.2)
SODIUM SERPL-SCNC: 143 MMOL/L (ref 136–145)
SODIUM SERPL-SCNC: 144 MMOL/L (ref 136–145)
SODIUM SERPL-SCNC: 144 MMOL/L (ref 136–145)
SPECIMEN DESCRIPTION: ABNORMAL
SYSTOLIC BLOOD PRESSURE - MUSE: 123 MMHG
SYSTOLIC BLOOD PRESSURE - MUSE: NORMAL MMHG
T AXIS - MUSE: -7 DEGREES
T AXIS - MUSE: 219 DEGREES
TROPONIN I SERPL-MCNC: 0.01 NG/ML (ref 0–0.29)
TSH SERPL DL<=0.005 MIU/L-ACNC: 1.76 UIU/ML (ref 0.3–5)
VENTRICULAR RATE- MUSE: 46 BPM
VENTRICULAR RATE- MUSE: 82 BPM
WBC # BLD AUTO: 6 10E3/UL (ref 4–11)
WBC: 7 THOU/UL (ref 4–11)

## 2021-01-01 PROCEDURE — 70553 MRI BRAIN STEM W/O & W/DYE: CPT

## 2021-01-01 PROCEDURE — G0463 HOSPITAL OUTPT CLINIC VISIT: HCPCS

## 2021-01-01 PROCEDURE — 99215 OFFICE O/P EST HI 40 MIN: CPT | Performed by: RADIOLOGY

## 2021-01-01 PROCEDURE — 99285 EMERGENCY DEPT VISIT HI MDM: CPT | Mod: 25

## 2021-01-01 PROCEDURE — 255N000002 HC RX 255 OP 636: Performed by: EMERGENCY MEDICINE

## 2021-01-01 PROCEDURE — 36415 COLL VENOUS BLD VENIPUNCTURE: CPT | Performed by: STUDENT IN AN ORGANIZED HEALTH CARE EDUCATION/TRAINING PROGRAM

## 2021-01-01 PROCEDURE — A9585 GADOBUTROL INJECTION: HCPCS | Performed by: EMERGENCY MEDICINE

## 2021-01-01 PROCEDURE — 250N000013 HC RX MED GY IP 250 OP 250 PS 637: Performed by: EMERGENCY MEDICINE

## 2021-01-01 PROCEDURE — 84443 ASSAY THYROID STIM HORMONE: CPT | Performed by: FAMILY MEDICINE

## 2021-01-01 PROCEDURE — 93225 XTRNL ECG REC<48 HRS REC: CPT

## 2021-01-01 PROCEDURE — 70450 CT HEAD/BRAIN W/O DYE: CPT

## 2021-01-01 PROCEDURE — 93005 ELECTROCARDIOGRAM TRACING: CPT | Performed by: EMERGENCY MEDICINE

## 2021-01-01 PROCEDURE — 99214 OFFICE O/P EST MOD 30 MIN: CPT | Performed by: INTERNAL MEDICINE

## 2021-01-01 PROCEDURE — 258N000003 HC RX IP 258 OP 636: Performed by: EMERGENCY MEDICINE

## 2021-01-01 PROCEDURE — 84484 ASSAY OF TROPONIN QUANT: CPT | Performed by: EMERGENCY MEDICINE

## 2021-01-01 PROCEDURE — 83735 ASSAY OF MAGNESIUM: CPT | Performed by: EMERGENCY MEDICINE

## 2021-01-01 PROCEDURE — 250N000011 HC RX IP 250 OP 636: Performed by: EMERGENCY MEDICINE

## 2021-01-01 PROCEDURE — 96374 THER/PROPH/DIAG INJ IV PUSH: CPT | Mod: 59

## 2021-01-01 PROCEDURE — 93000 ELECTROCARDIOGRAM COMPLETE: CPT | Performed by: GENERAL ACUTE CARE HOSPITAL

## 2021-01-01 PROCEDURE — 96361 HYDRATE IV INFUSION ADD-ON: CPT

## 2021-01-01 PROCEDURE — 80053 COMPREHEN METABOLIC PANEL: CPT | Performed by: EMERGENCY MEDICINE

## 2021-01-01 PROCEDURE — 93227 XTRNL ECG REC<48 HR R&I: CPT | Performed by: INTERNAL MEDICINE

## 2021-01-01 PROCEDURE — 85025 COMPLETE CBC W/AUTO DIFF WBC: CPT | Performed by: EMERGENCY MEDICINE

## 2021-01-01 RX ORDER — LORAZEPAM 2 MG/ML
0.5 INJECTION INTRAMUSCULAR ONCE
Status: COMPLETED | OUTPATIENT
Start: 2021-01-01 | End: 2021-01-01

## 2021-01-01 RX ORDER — ACETAMINOPHEN 325 MG/1
975 TABLET ORAL ONCE
Status: COMPLETED | OUTPATIENT
Start: 2021-01-01 | End: 2021-01-01

## 2021-01-01 RX ORDER — DEXAMETHASONE 2 MG/1
2 TABLET ORAL
Qty: 90 TABLET | Refills: 2 | Status: SHIPPED | OUTPATIENT
Start: 2021-01-01 | End: 2021-08-27

## 2021-01-01 RX ORDER — LEVETIRACETAM 500 MG/1
500 TABLET ORAL ONCE
Status: COMPLETED | OUTPATIENT
Start: 2021-01-01 | End: 2021-01-01

## 2021-01-01 RX ORDER — OXYCODONE HYDROCHLORIDE 5 MG/1
5 TABLET ORAL EVERY 6 HOURS PRN
Qty: 60 TABLET | Refills: 0 | Status: SHIPPED | OUTPATIENT
Start: 2021-01-01 | End: 2021-08-17

## 2021-01-01 RX ORDER — LEVETIRACETAM 500 MG/1
500 TABLET ORAL 2 TIMES DAILY
Qty: 30 TABLET | Refills: 0 | Status: SHIPPED | OUTPATIENT
Start: 2021-01-01

## 2021-01-01 RX ORDER — GADOBUTROL 604.72 MG/ML
6 INJECTION INTRAVENOUS ONCE
Status: COMPLETED | OUTPATIENT
Start: 2021-01-01 | End: 2021-01-01

## 2021-01-01 RX ORDER — DONEPEZIL HYDROCHLORIDE 10 MG/1
TABLET, FILM COATED ORAL
COMMUNITY
Start: 2021-01-01 | End: 2021-01-01

## 2021-01-01 RX ORDER — FERROUS SULFATE 325(65) MG
TABLET ORAL DAILY
COMMUNITY
Start: 2021-01-01

## 2021-01-01 RX ADMIN — ACETAMINOPHEN 975 MG: 325 TABLET ORAL at 18:09

## 2021-01-01 RX ADMIN — GADOBUTROL 6 ML: 604.72 INJECTION INTRAVENOUS at 21:13

## 2021-01-01 RX ADMIN — SODIUM CHLORIDE 1000 ML: 9 INJECTION, SOLUTION INTRAVENOUS at 17:59

## 2021-01-01 RX ADMIN — LORAZEPAM 0.5 MG: 2 INJECTION INTRAMUSCULAR; INTRAVENOUS at 20:21

## 2021-01-01 RX ADMIN — LEVETIRACETAM 500 MG: 500 TABLET ORAL at 01:15

## 2021-01-01 ASSESSMENT — MIFFLIN-ST. JEOR
SCORE: 1068.71
SCORE: 1042.85
SCORE: 1036.5
SCORE: 1027.42

## 2021-01-01 ASSESSMENT — PAIN SCALES - GENERAL
PAINLEVEL: WORST PAIN (10)
PAINLEVEL: MODERATE PAIN (4)

## 2021-05-29 NOTE — PATIENT INSTRUCTIONS - HE
Today you were seen for follow up of your abnormal lung findings. As you know, you were found to have a 4 by 1.8 cm mass in your left upper lung. Our most concerning suspicion is that it may be cancerous.     Plan:    Schedule a PET/CT to be done as soon as possible to look for unusual metabolic activity within the left lung mass or anywhere else in your body    Based on the results we can determine the next steps in your diagnostic work-up: biopsy, etc. We should get your results & come with a plan within 48 hours of your PET scan -- give us a call if you do not hear from us.     If you have any questions or concerns, please, call our clinic at [phone].

## 2021-05-29 NOTE — ANESTHESIA PREPROCEDURE EVALUATION
Anesthesia Evaluation      Patient summary reviewed     Airway   Mallampati: II  Neck ROM: full   Pulmonary - negative ROS and normal exam    breath sounds clear to auscultation                         Cardiovascular - negative ROS and normal exam  ECG reviewed (NSR with anterior T wave inversion)  Rhythm: regular  Rate: normal,         Neuro/Psych - negative ROS     Endo/Other    (+) arthritis, obesity,      GI/Hepatic/Renal            Dental    (+) chipped                       Anesthesia Plan  Planned anesthetic: general endotracheal    ASA 2 - emergent     Anesthetic plan and risks discussed with: patient and child/children  Anesthesia plan special considerations: rapid sequence induction,   Post-op plan: routine recovery

## 2021-05-29 NOTE — TELEPHONE ENCOUNTER
Physical Therapy HE is requesting verbal ok for 2W2, 1W1 to work on strengthening, gait/transfer training, and balance training.   Also requesting additional HHA visits for shower assist 3W2 (starting week of 6/16/19)  You can respond to this inbasket with the ok or call and leave a message at [phone]. Thank you

## 2021-05-29 NOTE — ANESTHESIA CARE TRANSFER NOTE
Last vitals:   Vitals:    06/03/19 2230   BP: 145/64   Pulse: 80   Resp: 16   Temp: 36.5  C (97.7  F)   SpO2: 100%     Patient's level of consciousness is awake  Spontaneous respirations: yes  Maintains airway independently: yes  Dentition unchanged: yes  Oropharynx: oropharynx clear of all foreign objects    QCDR Measures:  ASA# 20 - Surgical Safety Checklist: WHO surgical safety checklist completed prior to induction    PQRS# 430 - Adult PONV Prevention: 4558F - Pt received => 2 anti-emetic agents (different classes) preop & intraop  ASA# 8 - Peds PONV Prevention: 4558F - Pt received => 2 anti-emetic agents (different classes) preop & intraop  PQRS# 424 - Elva-op Temp Management: 4559F - At least one body temp DOCUMENTED => 35.5C or 95.9F within required timeframe  PQRS# 426 - PACU Transfer Protocol: - Transfer of care checklist used  ASA# 14 - Acute Post-op Pain: ASA14B - Patient did NOT experience pain >= 7 out of 10

## 2021-05-29 NOTE — TELEPHONE ENCOUNTER
Dr. Cobb is out of the office this week. Returned call to Michelle and gave verbal orders on his behalf, OK for OT. She will fax me the order to our clinic to have signed.

## 2021-05-29 NOTE — TELEPHONE ENCOUNTER
Request for Orders    Who s Requesting: [facility] Occupational Therapy    Orders being requested: OT 2w2 ADLs/IADLs and cognition    Where to send Orders: reply to message

## 2021-05-29 NOTE — ANESTHESIA POSTPROCEDURE EVALUATION
Patient: Gabriela Watkins  CHOLECYSTECTOMY, LAPAROSCOPIC  Anesthesia type: general    Patient location: PACU  Last vitals:   Vitals Value Taken Time   /65 6/3/2019 11:10 PM   Temp 36.5  C (97.7  F) 6/3/2019 10:30 PM   Pulse 80 6/3/2019 11:13 PM   Resp 17 6/3/2019 11:13 PM   SpO2 97 % 6/3/2019 11:13 PM   Vitals shown include unvalidated device data.  Post vital signs: stable  Level of consciousness: awake and responds to simple questions  Post-anesthesia pain: pain controlled  Post-anesthesia nausea and vomiting: no  Pulmonary: unassisted, return to baseline  Cardiovascular: stable and blood pressure at baseline  Hydration: adequate  Anesthetic events: no    QCDR Measures:  ASA# 11 - Elva-op Cardiac Arrest: ASA11B - Patient did NOT experience unanticipated cardiac arrest  ASA# 12 - Elva-op Mortality Rate: ASA12B - Patient did NOT die  ASA# 13 - PACU Re-Intubation Rate: ASA13B - Patient did NOT require a new airway mgmt  ASA# 10 - Composite Anes Safety: ASA10A - No serious adverse event    Additional Notes:

## 2021-05-29 NOTE — PROGRESS NOTES
Called patient (through patient's daughter, Mary Kay, as previously discussed) to discuss the findings of the PET CT scan.  Unfortunately, imaging showed FDG avid JULIANNA mass and FDG avid RUL nodule.  Concern for lung cancer with metastases versus synchronous lung cancers.  We will schedule patient for a CT-guided lung biopsy of both lesions (patient would need to hold Plavix).  I put in a referral for oncology clinic as well.    Sary Christina MD  Pulmonary and Critical Care

## 2021-05-29 NOTE — TELEPHONE ENCOUNTER
6/24/19: Referral to medical oncology received from Dr. Sary Christina for B lung masses.  Consult to be scheduled following biopsies.  Dr. Christina has placed the bx orders and the Lung Clinic is scheduling.   Case was discussed with Dr. Head and she recommended fiducial placement at the time of biopsies if feasible should SBRT be indicated.  Discussed with JULIANNA Rivera, at  Lung clinic.  She connected with Dr. Christina and she authorized verbal orders for fiducial placement.  Message sent to Doug in scheduling at  Lung Clinic.  Patient is on Plavix and this will need to be held prior to biopsy.  Per Sury in CT, Gabriela's case was reviewed by Dr. Guajardo and he would like biopsies done I week apart d/t concerns with her being off plavix for too long.  Left lung biopsy and fiducial placement is scheduled on 7/8/19.  Dr. Christina would like Gabriela to meet with an oncologist following her first biopsy.  I called Gabriela's daughter, Mary Kay, per family request to schedule the consult.  I introduced myself and my role.  Gabriela has been scheduled for a consult with Dr. Niño on Friday, July 12th 2019 at 3:00, RN appt at 2:45.  They were instructed to arrive by 2:35 with completed intake and updated medication list.  Gabriela had a CTA head and neck at [facility] on 6/6/19 and a lung mass was incidentally found.  She had a CT chest at [facility] on 6/7/19 and PET/CT at [facility] on 6/19/19.  She's had no additional work up outside of .  I verified with Mary Kay that her Mom has not met with a hematologist or oncologist in the past.  I mailed the new patient intake, medication list, appt letter, navigator letter and MD bio card to Gabriela's home address per Mary Kay's request.  I shared what they can expect with the appt.  I provided my direct number and invited calls.  I told Mary Kay I will be out of the office at the time of her Mom's consult, returning July 23rd.  I plan to f/u with them when I'm back in the office. They can call the  main clinic number and request to speak with a nurse in my absence if assistance is needed.

## 2021-05-29 NOTE — TELEPHONE ENCOUNTER
PATIENT NAME:  Gabriela Watkins  YOB: 1940  MRN: [id number]  SURGEON: Dr. 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 .          FOLLOW-UP:    DIAGNOSTICS:  radiology: MRI: MRA/FOLLOW UP DR. TRINIDAD AFTER  (ORDERED 6-7-19)  DISPOSITION:  NS SIGNED OFF 6-5-19    ADDITIONAL INSTRUCTIONS FOR MEDICAL STAFF:    No intervention planned for the incidental aneurysm.  MRA in 1 year and neurosurgery follow up

## 2021-05-30 NOTE — TELEPHONE ENCOUNTER
I called and spoke with daughter, Mary Kay regarding the discussion and recommendations of the thoracic tumor conference.  I told Mary Kay that the consensus is to cancel the fiduciary placement/biopsy scheduled for 7-.  Also at this time cancel and hold off on meeting the Radiation Oncologist.  Instead, I told Mary Kay that we still want mom to have her PFT's which are scheduled for Monday, 7- and then to expect a call from the [facility] to set up an appointment with a pulmonary surgeon, Dr. Rosado.  Gabriela stated she does not want any surgery, but her daughter and family at least want her to go and hear what he has to offer.  I told her also that if the PFT's are not good, then we would cancel the appointment with Dr. Rosado and reschedule with the radiation Oncologist.  I also gave them my name and contact info to call me with any questions while their navigator is out on vacation.    I have cancelled the appointments mentioned above.

## 2021-05-30 NOTE — BRIEF OP NOTE
[facility] Radiology Brief Op Note    CT BIOPSY LUNG  Procedure Note    Name:  Gabriela Watkins  PCP:  Kirsty Rainey PA-C  Procedure Date: 7/8/2019       Post-Procedure Diagnosis:  1. Lung mass         Findings:    CT guided lung bx and fiducial placement    Additional Staff:      Estimated Blood Loss:   Minimal    Specimens:    none    Complications:    None      Margarito Guajardo     Date: 7/8/2019  Time: 11:33 AM

## 2021-05-30 NOTE — PROGRESS NOTES
Case discussed at tumor conference.  Patient could be a candidate for surgery or stereotactic radiation therapy.  Will await pulmonary function tests.  Will refer patient to see Dr. Rosado to consider surgery.  Plan may be to do mediastinoscopy and wedge resection of the right lung lesion as the first step and if she tolerates this then resection of the left lung cancer.  If she is not a candidate for surgery she should be set up to see radiation oncology to consider stereotactic radiation therapy bilaterally.

## 2021-05-30 NOTE — CONSULTS
[facility] Hematology and Oncology Consult Note    Patient: Gabriela Watkins  MRN: [id number]  Date of Service: 07/12/2019        Reason for Visit    I was consulted by Dr. Christina regarding lung cancer    Assessment/Plan    T2 N0 M0, stage Ib left lung adenocarcinoma  Right upper lobe lung nodule measuring 0.8 cm  History of smoking    Patient had left lung biopsy and fiducial placement confirming lung adenocarcinoma.  Scheduled for right lung biopsy on July 29.    She is able to ambulate distances without any shortness of breath or dyspnea on exertion.  Activity is limited more by left hip discomfort.  This precludes her from walking up steps.  Has remote history of CVA but no history of any cardiac issues.    Discussed that she has either stage I or possibly stage II lung cancer on the left side and probable stage I lung cancer on the right.    Discussed that the standard treatment option would be to consider surgical resection with lymph node evaluation.  Based on more accurate staging then additional treatment would typically be considered.    Given her age she may not be a candidate for surgery and then stereotactic radiation therapy would be the optimal treatment and could provide similar outcomes in early stage compared to surgical resection.    Based on current CT and PET staging that would not appear to be any benefit from chemotherapy.    Patient and family's questions are answered.    We will get pulmonary function tests.  Will review case at tumor conference next week.  We will contact patient with final plan for treatment.  We will tentatively schedule follow-up with radiation oncology after her second lung biopsy which is due on July 29.    Plan: Schedule pulmonary function tests  Review case at tumor conference  We will schedule tentative appointment with radiation oncology after right lung biopsy on July 29          ECOG Performance   ECOG Performance Status: 1  Distress Assessment  Distress Assessment  Score: 4(nervous about appt)        Problem List    1. Lung mass  PFT Complete    PFT Complete           CC: Kirsty Rainey PA-C      ______________________________________________________________________________      Staging History    Cancer Staging  No matching staging information was found for the patient.    History    Ms. Gabriela Watkins is a very pleasant 78-year-old with recent hospitalization for gangrenous cholecystitis leading to findings of bilateral lung masses, 50-pack-year smoking history, history of CVA and hypertension who is referred for evaluation of lung cancer.    She had incidental finding of bilateral lung masses.  CT of the chest shows similar findings.  PET scan shows 4 cm lesion in the left lung and 0.8 cm bilobed lesion in the right lung.  No lymph node involvement.  No evidence of distant metastatic disease.  Patient had left lung mass biopsied along with fiducial placement.  Referred to us for further evaluation.    Denies any headaches dizziness or focal weakness.  Denies any shortness of breath, dyspnea on exertion or chest pain.  She can walk decent distances without problems related to her breathing.  Left hip pain limits her activity.    Appetite and weight are stable.  No change in bowels or urine.  No bleeding or rash.  ECOG status is 1.    Past history as above.    Past history includes hysterectomy, bladder surgery and recent cholecystectomy    She is  and lives in a mobile home.  She has 4 kids.  She is retired.    No previous personal history of cancer.  A sister had breast cancer.  Mother had rectal cancer.  A son has had skin cancer.    50-pack-year smoking history.  Does not drink alcohol.    Past History  Past Medical History:   Diagnosis Date     Acute gangrenous cholecystitis      Hypertension      Lung nodules      Stroke (H)      Past Surgical History:   Procedure Laterality Date     CT BIOPSY LUNG  7/8/2019     HYSTERECTOMY  age 30     OOPHORECTOMY  age  30     PA LAP,CHOLECYSTECTOMY N/A 6/3/2019    Procedure: CHOLECYSTECTOMY, LAPAROSCOPIC;  Surgeon: Samson Cobb MD;  Location: [facility] Main OR;  Service: General     Family History   Problem Relation Age of Onset     Colon cancer Mother      Breast cancer Sister         50s     Cancer Maternal Aunt         type unknown     Social History     Socioeconomic History     Marital status:      Spouse name: None     Number of children: None     Years of education: None     Highest education level: None   Occupational History     None   Social Needs     Financial resource strain: None     Food insecurity:     Worry: None     Inability: None     Transportation needs:     Medical: None     Non-medical: None   Tobacco Use     Smoking status: Former Smoker     Last attempt to quit: 2007     Years since quittin.0     Smokeless tobacco: Never Used   Substance and Sexual Activity     Alcohol use: None     Drug use: None     Sexual activity: None   Lifestyle     Physical activity:     Days per week: None     Minutes per session: None     Stress: None   Relationships     Social connections:     Talks on phone: None     Gets together: None     Attends Gnosticism service: None     Active member of club or organization: None     Attends meetings of clubs or organizations: None     Relationship status: None     Intimate partner violence:     Fear of current or ex partner: None     Emotionally abused: None     Physically abused: None     Forced sexual activity: None   Other Topics Concern     None   Social History Narrative     None     Allergies    No Known Allergies    Review of Systems    General  General (WDL): All general elements are within defined limits  ENT  Vertigo (Dizziness): None  Changes in vision: None  Glasses or Contacts: Yes - Recent (Less than 3 months)  Hearing loss: Yes - Recent (Less than 3 months)  Hearing Aids: None  Tinnitus: None  Pain/Pressure in ears: None  Sinus Congestion/Drainage: Yes  - Recent (Less than 3 months)  Hoarseness: None  Sore Throat: None  Dental Problems: None  Dentures: None  Respiratory  Dyspnea: None  hemoptysis: None  Is patient on O2?: None  Cough: None  Non-Cardiac Chest Pain: None  Cardiovascular  Palpitations: None  Edema Limbs: None  Irregular Heart Beat: None  Chest Pain: None  Lightheadedness: None  Endocrine  Heat Intolerance: None  Excessive Thirst: None  Cold Intolerance: Yes - Recent (Less than 3 months)  Excessive Urination: None  Hotflashes: None  Gastrointestinal  Difficulty Swallowing: None  Heartburn: None  Constipation: None  Yellowish skin and/or eyes: None  Blood from rectum: None  Nausea and Vomiting: None  Abdominal Pain: None  Diarrhea: None  Have had black or tan stools?: None  Hemorrhoids: None  Poor Appetite: Yes- Recent (Less than 3 months)  Musculoskeletal  Range of Motion Limitation: None  Joint pain: None  Back Pain: None(occasional with sitting)  Activity Assistance: None  Difficulty to lie flat for more than 30 minutes: None  Pain interfering with walking: Yes - Recent (Less than 3 months)(left foot)  Muscle pain or stiffness: None  Recent fall: None  Assistive device: Yes - Recent (Less than 3 months)  Neurological  History of LOC?: None  Headaches: None  Difficulty walking: None  Difficulty with speech: None  Difficulty with memory: None  Vertigo (Dizziness): None  Dominant Hand: Right  Seizures: None  Difficulty with Balance: None  Numbness and/or tingling: None  Psychological/Emotional  Psychological/Emotional (WDL): All psychological/emotional elements are within defined limits  Hematological/Lymphatic  Bleeding gums: None  Bruising: Yes - Recent (Less than 3 months)  Epistaxis: None  Swollen glands: None  Dermatological  Open wounds: None  Rash : None  Hair Loss: None  Healing Incision: None  Changes in moles: None  Significant sunburn: None  Genitourinary/Reproductive  Genitourinary/Reproductive (WDL): All genitourinary/reproductive elements  "are within defined limits  Reproductive (Females only)     Pain  Currently in Pain: No/denies    Physical Exam    Recent Vitals 7/12/2019   Height 4' 11\"   Weight 149 lbs 8 oz   BSA (m2) 1.68 m2   /64   Pulse 64   Temp 97.5   Temp src 1   SpO2 99   Some recent data might be hidden       GENERAL: Alert and oriented to time place and person. Seated comfortably. In no distress.    HEAD: Atraumatic and normocephalic.    EYES: RICKI, EOMI.  No pallor.  No icterus.    Oral cavity: no mucosal lesion or tonsillar enlargement.    NECK: supple. JVP normal.  No thyroid enlargement.    LYMPH NODES: No palpable, cervical, axillary or inguinal lymphadenopathy.    CHEST: clear to auscultation bilaterally.  Resonant to percussion throughout bilaterally.  Symmetrical breath movements bilaterally.    CVS: S1 and S2 are heard. Regular rate and rhythm.  No murmur or gallop or rub heard.  No peripheral edema.    ABDOMEN: Soft. Not tender. Not distended.  No palpable hepatomegaly or splenomegaly.  No other mass palpable.  Bowel sounds heard.    EXTREMITIES: Warm.    SKIN: no rash, or bruising or purpura.  Has a full head of hair.    CNS: Nonfocal    Lab Results    Recent Results (from the past 168 hour(s))   Protime-INR   Result Value Ref Range    INR 0.96 0.90 - 1.10   Hemoglobin   Result Value Ref Range    Hemoglobin 12.4 12.0 - 16.0 g/dL   Platelet Count   Result Value Ref Range    Platelets 178 140 - 440 thou/uL   Medical cytology   Result Value Ref Range    Case Report       Medical Cytology                                  Case: [id number]                                   Authorizing Provider:  Sary Christina     Collected:           07/08/2019 Nestor0                                     MD Nohemi                                                                  Ordering Location:     [facility] CT   Received:            07/08/2019 1133              Pathologist:           Lonnie Alonzo MD               "                                        Specimen:    Lung, Left                                                                                 Final Diagnosis       CT-GUIDED NEEDLE BIOPSY OF LEFT UPPER LOBE LUNG MASS:    - POSITIVE FOR MALIGNANT CELLS    - TUMOR MORPHOLOGY AND IMMUNOCHEMICAL PROFILE ARE CONSISTENT WITH ADENOCARCINOMA,        AND STRONGLY SUGGESTIVE OF LUNG AS PRIMARY TUMOR LOCATION     MCSS    Comment       The tumor tissue is negative for TTF-1 immunostain, and up to 30% of pulmonary adenocarcinomas may be negative for this marker. However, on immunochemical grounds alone, the possibility of a metastatic adenocarcinoma to the lung cannot be totally excluded, however remote.    Microscopic Description       Material examined consists of core sections and three imprint preparations. Core sections demonstrate a neoplastic lesion made up of epithelial cells showing a moderate degree of pleomorphism and atypism. Many of these cells are present in folded ribbons, and others form definite glands. This tissue is set within a matrix of reactive fibroblastic tissue and chronic inflammation. Normal lung parenchyma is not identified. The atypical cells are strongly positive for CK7 immunostain, and are negative for p63 and TTF-1 immunostains. All controls stain appropriately.    Numerous cohesive clusters of these cells are visible in the imprint preparations.    Clinical Information Left Upper Lobe Lung Mass     Specimen Description       CT guided core biopsy performed by Dr. Margarito Guajardo with 3 passes.   3 air dried slides   1 tissue block  Are prepared    Specimen Processing      Initial Cytologic Evaluation       Site 1, Episode 1, 3 passes: Positive for malignant cells.    JPL    Charges       CPT: 20221, 84920, 46259, 88341 x2  ICD-10: C34.90  PQRS:     cc: Margarito Guajardo MD    Result Flag Malignant (!) Normal    General Path Interpretation Positive for malignant cells (!) Negative for  malignant cells, Non-Diagnostic       Imaging Results    Xr Chest 1 View Portable    Result Date: 7/8/2019  EXAM: XR CHEST 1 VIEW PORTABLE LOCATION: [facility] DATE/TIME: 7/8/2019 1:53 PM INDICATION: lung mass, post bx COMPARISON: None. FINDINGS: 2 fiducial markers now project over the left upper lobe mass. No pneumothorax or pleural effusion. Borderline cardiomegaly stable.    Ct Lung Biopsy    Result Date: 7/8/2019  EXAM: 1. PERCUTANEOUS BIOPSY LEFT UPPER LOBE MASS. 2. FIDUCIAL PLACEMENT LEFT LUNG MASS. 3. CT GUIDANCE 4. CONSCIOUS SEDATION LOCATION: [facility] DATE/TIME: 7/8/2019 11:33 AM INDICATION: Other nonspecific abnormal finding of lung field TECHNIQUE: Dose reduction techniques were used. PROCEDURE: Informed consent obtained. Time out performed. The site was prepped and draped in sterile fashion. 15 mL of 1% lidocaine was infused into the local soft tissues. Using standard technique and under direct CT guidance, a 20-gauge biopsy device was used to obtain three core biopsies. Tissue was submitted to Pathology and was adequate by preliminary review by a pathologist. The indwelling guide needle was then advanced more posterior and anterior into the lung mass with placement of one fiducial marker. The needle was withdrawn to the more anterior inferior subpleural location and a second fiducial marker was placed. The patient tolerated the procedure well. No immediate complications. RADIOLOGIC SUPERVISION AND INTERPRETATION: CT GUIDANCE: Images demonstrate the biopsy needle to be in good position. SEDATION: Versed 1.5 mg. Fentanyl 75 mcg. The procedure was performed with administration intravenous conscious sedation with appropriate preoperative, intraoperative, and postoperative evaluation. 30 minutes of supervised face to face conscious sedation time was provided by a radiology nurse under my direct supervision.     CONCLUSION: 1.  Successful CT-guided biopsy left upper lobe mass. 2.   Placement of 2 fiducial markers into left upper lobe mass with CT guidance. Reference CPT Codes: 33446, 67145, 69549, 26126, 56237    Nm Pet Ct Skull To Mid Thigh    Result Date: 6/19/2019  EXAM: NM PET CT SKULL TO MID THIGH LOCATION: [facility] DATE/TIME: 6/19/2019 2:37 PM INDICATION: New diagnosis of left upper lobe lung mass. Low-attenuation lobular nodule right upper lobe. Initial treatment strategy. COMPARISON: CT 06/07/2019. TECHNIQUE: Serum glucose level 108 mg/dL. One hour post intravenous administration of 9.6 mCi F-18 FDG, PET imaging was performed from the skull base to the mid thighs utilizing attenuation correction with concurrent axial CT and PET/CT image fusion. Dose reduction techniques were used. FINDINGS: The spiculated elongated anterior left upper lobe mass measuring 4.0 cm demonstrates marked FDG activity, SUV max 9.2, and this should represent a primary lung cancer. The well-circumscribed lobulated right upper lobe nodule measuring up to 0.8 cm in diameter demonstrates marked FDG activity, SUV max 8.6, and this is also suspicious for malignancy either a synchronous primary lung cancer or a metastasis. Within the liver, there is an area of nodular and confluent FDG activity, SUV max up to 5.7, within segment 5 adjacent the gallbladder fossa and inferior aspect of the liver which is indeterminate. The patient is status post cholecystectomy and there is an amorphous fluid collection extending from the gallbladder fossa along the inferior margin of the liver incompletely characterized on the CT chest from 06/07/2019. Findings could all be inflammatory. FDG activity elsewhere in the body is negative. No enlarged or FDG avid hilar or mediastinal lymph nodes in the chest. Adrenal activity at background levels. Coronary artery calcification consistent with coronary artery disease. Bilateral renal cysts. Scattered colonic diverticulosis. Hysterectomy. Degenerative disc disease throughout the  spine.     CONCLUSION: FDG avid left upper lobe mass should represent a primary lung cancer. FDG avid right upper lobe pulmonary nodule suspicious for malignancy either synchronous lung primary or metastasis. Rind of FDG activity along the lower margin of the right lobe of the liver adjacent an amorphous fluid collection suspicious for inflammatory reaction and possible abscess related to cholecystectomy. Recommend dedicated CT abdomen pelvis with IV contrast  and possible ultrasound guided biopsy or aspiration as clinically indicated.    Ct Fiducial Placement Lung    Result Date: 7/8/2019  EXAM: 1. PERCUTANEOUS BIOPSY LEFT UPPER LOBE MASS. 2. FIDUCIAL PLACEMENT LEFT LUNG MASS. 3. CT GUIDANCE 4. CONSCIOUS SEDATION LOCATION: [facility] DATE/TIME: 7/8/2019 11:33 AM INDICATION: Other nonspecific abnormal finding of lung field TECHNIQUE: Dose reduction techniques were used. PROCEDURE: Informed consent obtained. Time out performed. The site was prepped and draped in sterile fashion. 15 mL of 1% lidocaine was infused into the local soft tissues. Using standard technique and under direct CT guidance, a 20-gauge biopsy device was used to obtain three core biopsies. Tissue was submitted to Pathology and was adequate by preliminary review by a pathologist. The indwelling guide needle was then advanced more posterior and anterior into the lung mass with placement of one fiducial marker. The needle was withdrawn to the more anterior inferior subpleural location and a second fiducial marker was placed. The patient tolerated the procedure well. No immediate complications. RADIOLOGIC SUPERVISION AND INTERPRETATION: CT GUIDANCE: Images demonstrate the biopsy needle to be in good position. SEDATION: Versed 1.5 mg. Fentanyl 75 mcg. The procedure was performed with administration intravenous conscious sedation with appropriate preoperative, intraoperative, and postoperative evaluation. 30 minutes of supervised face to face  conscious sedation time was provided by a radiology nurse under my direct supervision.     CONCLUSION: 1.  Successful CT-guided biopsy left upper lobe mass. 2.  Placement of 2 fiducial markers into left upper lobe mass with CT guidance. Reference CPT Codes: 91574, 17628, 49085, 14533, 27270        Signed by: Quintin Niño MD

## 2021-05-31 NOTE — ANESTHESIA CARE TRANSFER NOTE
Patient spontaneously breathing.  Tidal volumes > 300ml.  Following commands, suctioned and extubated with balloon down.  O2 via mask at 10L.  To PACU, VSS, SBAR report to RN per institutional handoff policy and procedure.  Transfer of care.    Last vitals:   Vitals:    08/08/19 1301   BP: 134/61   Pulse: 60   Resp: 16   Temp: 36.4  C (97.5  F)   SpO2: 100%     Patient's level of consciousness is drowsy  Spontaneous respirations: yes  Maintains airway independently: yes  Dentition unchanged: yes  Oropharynx: oropharynx clear of all foreign objects    QCDR Measures:  ASA# 20 - Surgical Safety Checklist: WHO surgical safety checklist completed prior to induction    PQRS# 430 - Adult PONV Prevention: 4558F - Pt received => 2 anti-emetic agents (different classes) preop & intraop  ASA# 8 - Peds PONV Prevention: NA - Not pediatric patient, not GA or 2 or more risk factors NOT present  PQRS# 424 - Elva-op Temp Management: 4559F - At least one body temp DOCUMENTED => 35.5C or 95.9F within required timeframe  PQRS# 426 - PACU Transfer Protocol: - Transfer of care checklist used  ASA# 14 - Acute Post-op Pain: ASA14B - Patient did NOT experience pain >= 7 out of 10

## 2021-05-31 NOTE — ANESTHESIA PROCEDURE NOTES
Arterial Line  Reason for Procedure: hemodynamic monitoring and multiple ABGs  Patient location during procedure: Pre-op  Start time: 8/8/2019 11:35 AM  End time: 8/8/2019 11:42 AM  Staffing:  Performing  Anesthesiologist: Orlando Calderón MD  Sterile Precautions:  sterile barriers used during insertion: cap, mask, sterile gloves, large sheet, and hand hygiene used.  Arterial Line:   Immediately prior to procedure a time out was called to verify the correct patient, procedure, equipment, support staff and site/side marked as required  Laterality: right  Location: radial  Prepped with: ChloroPrep    Needle gauge: 20 G  Number of Attempts: 1  Secured with: tape, transparent dressing and pressure dressing  Flushed with: saline  1% lidocaine local anesthesia used for skin prep.   See MAR for additional medications given.  Ultrasound evaluation of access site: yes  Vessel patent by US exam    Concurrent real time visualization of needle entry

## 2021-05-31 NOTE — TELEPHONE ENCOUNTER
7/25/19: Sandra completed PFTs on 7/22 as ordered by Dr. Niño.  She is to meet with Dr. Rosado at the  to discuss surgical options if PFTs are good.  She will need to be rescheduled with radiation oncology if surgery is not an option or she declines.  I noted an appt has not been scheduled.  Message sent to Sandra's providers today.  7/27/19: Dr. Rosado relayed that Sandra was called to schedule an appt at the  but did not want to drive in [facility] if not necessary.  He requested she get scheduled to meet with him at  Lung clinic but her appt has not been scheduled.  I called Doug at  Lung and Sandra is scheduled for a consult with Dr. Rosado on Thursday, August 1st, at 1:30 pm, 1:15 arrival.  I called Sandra to provide appt details and she requested I speak with her daughter, Mary Kay.  I called Mary Kay and she stated this date and time will work for them.  She was appreciative of the call and has no further questions.

## 2021-05-31 NOTE — PROGRESS NOTES
THORACIC SURGERY ESTABLISHED PATIENT OFFICE VISIT    Dear Dr. Rainey,    I saw Ms. Watkins in follow-up today. The clinical summary follows:     PREOP DIAGNOSIS   1) LEFT upper lobe adenocarcinoma (cT2aN0)   2) RIGHT upper lobe PET positive nodule suspicious for malignancy (cT1aN0)   3) 1 week S/P mediastinoscopy  4) 10 weeks post ischemic stroke with minimal sequelae    PROCEDURE   Mediastinoscopy (8/8/2019)    HISTOPATHOLOGY   4R, 4L, and 7 negative    COMPLICATIONS  None    INTERVAL STUDIES  None    ETOH neg  TOB neg  BMI 29    SUBJECTIVE   Ms. Watkins has recovered quite well, she only has a mild dry cough.    IMPRESSION   1. Malignant neoplasm of upper lobe of left lung (H)     2. Pulmonary nodules       79 year-old female with:  1) LEFT upper lobe adenocarcinoma (cT2aN0)   2) RIGHT upper lobe PET positive nodule suspicious for malignancy (cT1aN0)   3) 1 week S/P mediastinoscopy  4) 10 weeks post ischemic stroke with minimal sequelae    PLAN  I spent a total of 40 minutes with Ms. Watkins and her daughter, Stefani, more than half of which were spent in counseling, coordination of care, and face-to-face time. I reviewed the plan as follows:    After a long conversation, I decided that a LEFT upper lobe superior trisegmentectomy would be the best next step. The RUL lesion is small and can wait, and the RUL is much more likely to be successfully treated with stereotactic radiation if necessary than the larger JULIANNA known cancer.    1) Procedure planned at [facility] (due to scheduling logistics): LEFT VATS superior trisegmentectomy, with possible thoracotomy.  I reviewed the indications, risks, and benefits of the procedure with Ms. Gabriela Watkins. We discussed the intraoperative risks of bleeding and injury to vital organs, potential postoperative complications including, but not limited to, major respiratory events, arrhythmia, bleeding, infection, reoperation, and death. I explained the anticipated hospital course  (+/- 2-4 days) and postoperative recovery including pain control, chest drain management, and variable degrees of dyspnea (or need for supplemental oxygen) and fatigue that tend to get better with time.      2) PAC visit at [facility]    3) Lovenox 30 mg SQ in preop holding    4) Repeat CT chest    5) Type and cross 2 U PRBC    They had all their questions answered and were in agreement with the plan.  I appreciate the opportunity to participate in the care of your patient and will keep you updated.  Sincerely,

## 2021-05-31 NOTE — ANESTHESIA PREPROCEDURE EVALUATION
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 Cholecystectomy 6/2019  Was started on Plavix.  Cerebral aneurysm, incidental finding    Endo/Other    (+) arthritis,      GI/Hepatic/Renal - negative ROS      Other findings: 6/5/19 Echo  Summary     Normal left ventricular size.The calculated left ventricular ejection fraction is 60%. This represents a normal ejection fraction. Mild hypertrophy noted. Left ventricular diastolic dysfunction consistent with pseudonormalization. No thrombus. No mass.    Left atrial volume is mildly increased.    Normal right ventricular size and systolic function.    No hemodynamically significant valvular heart abnormalities.    No previous study for comparison.    06/06/19 1042 CTA Head and Neck   Impression:   CONCLUSION:  HEAD CT:  1. Evolving areas of recent infarct in the left parasagittal parietal lobe and along the left parieto-occipital junction as identified on recent brain MRI.  2. No new infarct. No hemorrhage.    HEAD CTA:  1. 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  2. Intracranial atherosclerosis without proximal large vessel occlusion.    NECK CTA:  1. 50% stenosis proximal left internal carotid artery by NASCET criteria. Less than 50% narrowing distal left common carotid artery.  2. No hemodynamically significant narrowing in the right carotid artery by NASCET criteria.  3. Patent vertebral arteries. No dissection.  4. A 3.5  x 2 cm parenchymal density in the anterior upper left lung suspicious for malignancy. Recommend further evaluation with chest CT.         Dental - normal exam                        Anesthesia Plan  Planned anesthetic: general endotracheal  Glidescope    Phenylephrine inline, right radial arterial line, maintain MAPs above 80  Keep relaxed during case, reverse with Sugammadex      Decadron  Zofran      ASA 3   Induction: intravenous   Anesthetic plan and risks discussed with: patient and child/children  Anesthesia plan special considerations: video-assisted, antiemetics, arterial catheterization, IV therapy two IVs,   Post-op plan: routine recovery

## 2021-05-31 NOTE — TELEPHONE ENCOUNTER
Sandra's daughter, Mary Kay, left a message stating she called her Mom's primary clinic to verify they faxed the H and P to [facility] and she was told that was already taken care of.  She wanted to verify [facility] received it.  She saw a note surgery could be canceled if the hospital doesn't have the H and P.  Mary Kay provided the phone number for her Mom's [facility] clinic; [phone].  I called [facility] and left a message re the above.  I received a call back from Yuval that this information was passed along to their HUC who will ensure they have the pre-op physical.  Yuval stated I can also let Mary Kay know they can expect a phone call, typically the day prior to surgery, to verify some information and get prep.    I called Mary Kay back and relayed the above.  She was appreciative of the f/u.  I wished her Mom well with her surgery on Thursday.  I informed Mary Kay I will be out of the office for a period of time and encouraged her to call the main clinic number if further assistance is needed in my absence.  I provided the number today.

## 2021-05-31 NOTE — ANESTHESIA POSTPROCEDURE EVALUATION
Patient: Gabriela Watkins  MEDIASTINOSCOPY  Anesthesia type: general    Patient location: Phase II Recovery  Last vitals:   Vitals Value Taken Time   /71 8/8/2019  2:03 PM   Temp 36.4  C (97.5  F) 8/8/2019  1:01 PM   Pulse 56 8/8/2019  2:04 PM   Resp 21 8/8/2019  1:39 PM   SpO2 94 % 8/8/2019  2:04 PM   Vitals shown include unvalidated device data.  Post vital signs: stable  Level of consciousness: awake and responds to simple questions  Post-anesthesia pain: pain controlled  Post-anesthesia nausea and vomiting: no  Pulmonary: unassisted, return to baseline  Cardiovascular: stable and blood pressure at baseline  Hydration: adequate  Anesthetic events: no    QCDR Measures:  ASA# 11 - Elva-op Cardiac Arrest: ASA11B - Patient did NOT experience unanticipated cardiac arrest  ASA# 12 - Elva-op Mortality Rate: ASA12B - Patient did NOT die  ASA# 13 - PACU Re-Intubation Rate: ASA13B - Patient did NOT require a new airway mgmt  ASA# 10 - Composite Anes Safety: ASA10A - No serious adverse event    Additional Notes:

## 2021-06-01 NOTE — TELEPHONE ENCOUNTER
I called Sandra's daughter, Mary Kay, to see how Sandra is doing following her surgery on 8/30 at the  (Left Video Assisted Thoracoscopic Superior Trisegmentectomy).  Mary Kay said she is doing quite well.  She gets tired easily but she feels it's appropriate with her age and all she's been through.  She was napping at the time of my call.  Mary Kay said she hasn't had a whole lot of pain and her breathing has been normal.  She encourages her to use her IS to help her expand her lungs though she doesn't like using it.  As of today, she stopped using her walker during the daytime but plans to still use it at night.  Mary Kay and her Mom live in a mobile home together.  They're on opposite ends so she keeps a baby monitor in her Mom's room to know if she needs help at night.    Sandra is scheduled for a f/u at the  on 9/25.  I will connect with Mary Kay after that appt regarding long term f/u care with Dr. Niño.  Sandra may need to meet with a radiation oncologist re the right upper lobe nodule which is suspicious for malignancy.  Mary Kay was appreciative of the call.

## 2021-06-01 NOTE — PROGRESS NOTES
Sandra's case was discussed at today's interdisciplinary thoracic tumor conference and it is was recommended she f/u with Dr. Niño.  He will discuss biopsy and fiducial placement for Sandra's RUL nodule.  Dr. Rosado did not advise surgery.  I called Sandra's daughter, Mary Kay, and relayed the above discussion.  She spoke with her Mom and they are agreeable to scheduling the f/u appt.  Mary Kay did say her Mom is not interested in systemic tx should that be advised at some point.  I provided Mary Kay with the main clinic number to call and schedule a f/u appt if she wishes.  I also sent a  message to Macarena in scheduling today to please schedule Sandra for a post op f/u appt with Dr. Niño.

## 2021-06-02 NOTE — PROGRESS NOTES
Sandra's pathology from her 10/30/19 CT guided R lung biopsy shows small cell lung cancer.  Dr. Niño would like to see her in clinic next week to discuss systemic tx and consult with Dr. Head can be canceled.  He spoke with Sandra's daughter, Mary Kay, and provided biopsy results and f/u plan.   I canceled Sandra's consult with Dr. Head and spoke with Macarena in scheduling.  She will call Mary Kay to schedule the f/u appt with Dr. Niño.  Sandra has been scheduled for f/u with Dr. Niño on Friday, Nov 8th at 11:30, 11:15 RN appt per Macarena.  I called Mary Kay today and left a meesage confirming the appt with Dr. Head has been canceled and f/u with Dr. Niño is scheduled on Nov 8th.  I invited calls for questions or needs.

## 2021-06-02 NOTE — PROGRESS NOTES
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  Given patient's age I would not recommend adjuvant chemotherapy as it would provide minimal benefit.    Discussed further management of the right upper lobe lung nodule.  Case discussed at tumor conference and we will proceed with right lung biopsy with fiducial placement and then referral for stereotactic radiation.    Discussed follow-up for her lung cancer.  Explained that there remains risk for relapse.  Typically would do CT scans every 6 months for the first 2 years and then every year until she is 5 years out.  Therefore we will plan to see patient at the end of February with repeat CT of the chest with contrast.    Patient and daughter's questions are answered.    Plan: No adjuvant chemotherapy recommended for her left lung cancer  We will schedule right lung nodule biopsy and fiducial placement  Patient will see radiation oncology a week after biopsy  Follow-up with me at the end of February with repeat CT of the chest    Measurable disease: CT of the chest    Current therapy: Observation            Cancer Staging  Primary lung adenocarcinoma, left (H)  Staging form: Lung, AJCC 8th Edition  - Clinical stage from 10/15/2019: Stage IIB (cT3, cN0, cM0) - Signed by Quintin Niño MD on 10/15/2019      ECOG Performance   ECOG Performance Status: 2    Distress Assessment  Distress Assessment Score: No distress    Pain         Problem List    1. Primary lung  "adenocarcinoma, left (H)  CT Lung Biopsy    CT Fiducial Placement Lung    CT Chest With Contrast    Ambulatory referral to Radiation Therapy        CC: Kirsty Rainey PA-C    ______________________________________________________________________________    History of Present Illness    Ms. Gabriela Watkins returns for reevaluation.  She was seen back in July.  She underwent mediastinoscopy which was negative.  She then had surgery in August 30 with left upper lobe trisegmentectomy and lymph node dissection.  She is recovered well from this.  No headaches or dizziness.  No shortness of breath or cough.  No new bone or abdominal pain.  ECOG status is 1.    Pathology shows 5.2 cm tumor which is invasive mucinous adenocarcinoma and is moderately differentiated.  Lymphovascular invasion.  0 out of 7 lymph nodes are involved.    Pain Status  Currently in Pain: No/denies    Review of Systems    Constitutional  Constitutional (WDL): Exceptions to WDL  Fatigue: Fatigue relieved by rest  Neurosensory  Neurosensory (WDL): Exceptions to WDL  Ataxia: Asymptomatic, clinical or diagnostic observations only, intervention not indicated(Slow gait. )  Cardiovascular  Cardiovascular (WDL): Exceptions to WDL(Hx: Afib. Will be seeing Cardiologist. )  Edema: Yes(Feet/ankles. Mostly the Rt one.)  Pulmonary  Respiratory (WDL): Exceptions to WDL  Cough: Mild symptoms, nonprescription intervention indicated(Once in a while. )  Gastrointestinal  Gastrointestinal (WDL): Exceptions to WDL  Anorexia: Loss of appetite without alteration in eating habits(\"I don't eat a lot.\")  Diarrhea: Increase of <4 stools per day over baseline, mild increase in ostomy output compared to baseline(Once in a while. Gallbladder out. )  Genitourinary  Genitourinary (WDL): All genitourinary elements are within defined limits  Integumentary  Integumentary (WDL): All integumentary elements are within defined limits  Patient Coping  Patient Coping: " Accepting  Distress Assessment  Distress Assessment Score: No distress  Accompanied by  Accompanied by: Family Member    Past History  Past Medical History:   Diagnosis Date     Acute gangrenous cholecystitis      Arthritis      Cancer (H)      Cerebral aneurysm, unruptured     11mm     Dyslipidemia      Hypertension      Lung nodules      Osteoarthritis      Primary adenocarcinoma of upper lobe of left lung      Short-term memory loss      Stroke (H)          Past Surgical History:   Procedure Laterality Date     BLADDER SURGERY       CATARACT EXTRACTION       CT BIOPSY LUNG  7/8/2019     HYSTERECTOMY  age 30     MEDIASTINOSCOPY N/A 8/8/2019    Procedure: MEDIASTINOSCOPY;  Surgeon: Bernard Rosado MD;  Location: [facility] Main OR;  Service: Cardiovascular     OOPHORECTOMY  age 30     HI LAP,CHOLECYSTECTOMY N/A 6/3/2019    Procedure: CHOLECYSTECTOMY, LAPAROSCOPIC;  Surgeon: Samson Cobb MD;  Location: [facility] OR;  Service: General       Physical Exam    Recent Vitals 10/15/2019   Height -   Weight 144 lbs 13 oz   BSA (m2) 1.65 m2   /58   Pulse 58   Temp 97.8   Temp src 1   SpO2 98   Some recent data might be hidden       GENERAL: Alert and oriented to time place and person. Seated comfortably. In no distress.    HEAD: Atraumatic and normocephalic.    EYES: RICKI, EOMI.  No pallor.  No icterus.    Oral cavity: no mucosal lesion or tonsillar enlargement.    NECK: supple. JVP normal.  No thyroid enlargement.    LYMPH NODES: No palpable, cervical, axillary or inguinal lymphadenopathy.    CHEST: clear to auscultation bilaterally.  Resonant to percussion throughout bilaterally.  Symmetrical breath movements bilaterally.    CVS: S1 and S2 are heard. Regular rate and rhythm.  No murmur or gallop or rub heard.  No peripheral edema.    ABDOMEN: Soft. Not tender. Not distended.  No palpable hepatomegaly or splenomegaly.  No other mass palpable.  Bowel sounds heard.    EXTREMITIES: Warm.    SKIN: no  rash, or bruising or purpura.  Has a full head of hair.      Lab Results    No results found for this or any previous visit (from the past 168 hour(s)).    Imaging    Ct External Imaging Abdomen    Result Date: 9/25/2019  Please see PACS Hyperlink for images and scanned result text.        Signed by: Quintin Niño MD

## 2021-06-02 NOTE — PATIENT INSTRUCTIONS - HE
1. Discontinue atenolol  2. Increase metoprolol tartrate to 75 mg (1 and half of 50 mg tablets) twice daily  3. Set up monitor after biopsy to look for recurrent atrial fibrillation

## 2021-06-02 NOTE — PRE-PROCEDURE
Procedure Name: R lung biopsy and fiducial placement  Date/Time: 10/30/2019 8:34 AM    Verbal consent obtained?: Yes  Written consent obtained?: Yes  Risks and benefits: Risks, benefits and alternatives were discussed  Discharge plan: Appropriate discharge home plan in place for patients who are going home after procedure   Consent given by: patient  Expected level of sedation: moderate  ASA Class: Class 2- mild systemic disease, no acute problems, no functional limitations  Mallampati: Grade 2- soft palate, base of uvula, tonsillar pillars, and portion of posterior pharyngeal wall visible  Patient states understanding of procedure being performed: Yes  Patient's understanding of procedure matches consent: Yes  Procedure consent matches procedure scheduled: Yes  Appropriately NPO: yes  Lungs: lungs clear with good breath sounds bilaterally  Heart: normal heart sounds and rate  History & Physical reviewed: History and physical reviewed and no updates needed  Statement of review: I have reviewed the lab findings, diagnostic data, medications, and the plan for sedation

## 2021-06-02 NOTE — PROCEDURES
[facility]    Procedure: IR Procedure Note  Date/Time: 10/30/2019 9:28 AM  Performed by: Sagar Wynn MD  Authorized by: Sagar Wynn MD       Universal Protocol    Site marked: Yes    Prior images obtained and reviewed: Yes    Required items: required blood products, implants, devices, and special equipment available    Patient identity confirmed: verbally with patient    Reevaluation: Patient was reevaluated immediately before administering moderate or deep sedation or anesthesia    Confirmation checklist: patient's identity using two indicators, relevant allergies, procedure was appropriate and matched the consent or emergent situation and correct equipment/implants were available    Time out: Immediately prior to procedure a time out was called to verify the correct patient, procedure, equipment, support staff and site/side marked as required    Universal Protocol: Joint Commission Universal Protocol was followed    Preparation: Patient was prepped and draped in the usual sterile fashion    Anesthesia    Local anesthesia used?: Yes    Anesthesia: local infiltration    Local anesthetic: lidocaine 1% without epinephrine    Sedation    Patient sedation: Yes    Sedation type: moderate (conscious) sedation    Sedation: fentanyl and diazepam    Vital signs: Vital signs monitored during sedation  Specimens: core needle biopsy specimens sent for pathological analysis  Complications: None  Condition: Stable    Post-procedure    Patient tolerance: Patient tolerated the procedure well with no immediate complications   Length of time physician present for 1:1 monitoring during sedation: 30

## 2021-06-03 NOTE — PROGRESS NOTES
Pt arrived ambulatory to clinic for Cycle # 1 Day # 1 of her chemotherapy regimen.  Pt attended chemotherapy class, Dr. Niño obtained consent for  Chemotherapy on 11/15, and Sharita reviewed medications and potential side effects during today's visit. Aisha started IV on 2nd attempt without difficulty with excellent blood return.  Administered premedication and chemotherapy per MD order.  Pt tolerated infusion well, no s/s of infusion reaction.  IV was flushed with NS then wrapped up for tomorrows use.  Reviewed chemotherapy D/C instructions with pt and daughter.  Pt and family verbalized understanding of plan of care and return to clinic.

## 2021-06-03 NOTE — PROGRESS NOTES
Pt arrived ambulatory to clinic for Cycle # 1 Day # 2 of her chemotherapy regimen.  Pt states that she felt good after yesterday's treatment.  She has to void more frequently and slight sleep disturbance, but felt good overall.   IV flushed easily with excellent blood return.  Administered premedication and chemotherapy per MD order.  Pt tolerated infusion well, no s/s of infusion reaction.  IV was flushed with NS then wrapped up for tomorrows use.  Pt and family verbalized understanding of plan of care and return to clinic.

## 2021-06-03 NOTE — PROGRESS NOTES
[facility] Hematology and Oncology Progress Note    Patient: Gabriela Watkins  MRN: [id number]  Date of Service: 11/08/2019        Reason for Visit    Chief Complaint   Patient presents with     HE Cancer     Primary lung adenocarcinoma       Assessment and Plan    T3 N0 M0, stage IIb adenocarcinoma of the left lung status post trisegmentectomy on August 30, 2019  Tumor is PDL 1 negative; K-wilberto mutated, no other alterations noted  Right upper lobe lung nodule, biopsy shows small cell lung cancer diagnosis in October 2019  History of smoking    Biopsy of right upper lobe lung nodule shows small cell lung cancer.  Discussed today with patient and family in person.  Explained that this is a more aggressive type of lung cancer.  Will need to get repeat CT scans for restaging and also MRI of the brain.    Discussed that if there is a solitary nodule alone then potentially she could be treated with chemotherapy alone.  The more standard therapy is concurrent chemotherapy and radiation and I described how this is typically administered.  Reviewed potential side effects of this.  Radiation therapy alone could be considered but it is clearly inferior therapy.    Discussed prognosis and if she has limited stage disease median survival with concurrent chemotherapy and radiation may be between 12 and 18 months with about 15 to 20% of patients being cured.  Without treatment median survival may be 3 to 6 months.    Patient agreeable to restaging scans and follow-up.    Plan: Schedule CT of the chest abdomen and pelvis  MRI of the brain  Xanax for sedation for MRI  Follow-up in a week to review      Measurable disease: CT of the chest    Current therapy: Observation            Cancer Staging  Primary lung adenocarcinoma, left (H)  Staging form: Lung, AJCC 8th Edition  - Clinical stage from 10/15/2019: Stage IIB (cT3, cN0, cM0) - Signed by Quintin Niño MD on 10/15/2019      ECOG Performance   ECOG Performance  Status: 2    Distress Assessment  Distress Assessment Score: No distress    Pain         Problem List    1. Small cell lung cancer, right lower lobe (H)  CT Chest Abdomen Pelvis Without Oral With IV Contrast    MR Brain With Without Contrast    ALPRAZolam (XANAX) 0.25 MG tablet        CC: Kirsty Rainey PA-C    ______________________________________________________________________________    History of Present Illness    Ms. Gabriela Watkins returns for reevaluation.  She was seen about a month ago.  Did have biopsy which shows unexpectedly small cell lung cancer.  Denies headaches new shortness of breath or cough.  Reports stable appetite and weight.  ECOG status 1-2.  Pain Status  Currently in Pain: No/denies    Review of Systems    Constitutional  Constitutional (WDL): Exceptions to WDL  Fatigue: Fatigue relieved by rest  Neurosensory  Neurosensory (WDL): Exceptions to WDL  Ataxia: Asymptomatic, clinical or diagnostic observations only, intervention not indicated  Cardiovascular  Cardiovascular (WDL): Exceptions to WDL(Hx: A fib. )  Edema: Yes(Rt ankle; some swelling. )  Pulmonary  Respiratory (WDL): Exceptions to WDL  Cough: Mild symptoms, nonprescription intervention indicated(Once in a while. )  Gastrointestinal  Gastrointestinal (WDL): Exceptions to WDL  Anorexia: Loss of appetite without alteration in eating habits  Diarrhea: Increase of <4 stools per day over baseline, mild increase in ostomy output compared to baseline  Genitourinary  Genitourinary (WDL): All genitourinary elements are within defined limits  Integumentary  Integumentary (WDL): All integumentary elements are within defined limits  Patient Coping  Patient Coping: Accepting  Distress Assessment  Distress Assessment Score: No distress  Accompanied by  Accompanied by: Family Member    Past History  Past Medical History:   Diagnosis Date     Acute gangrenous cholecystitis      Arthritis      Cancer (H)      Cerebral aneurysm, unruptured      11mm     Dyslipidemia      Hypertension      Lung nodules      Osteoarthritis      Primary adenocarcinoma of upper lobe of left lung      Short-term memory loss      Stroke (H)          Past Surgical History:   Procedure Laterality Date     BLADDER SURGERY       CATARACT EXTRACTION       CT BIOPSY LUNG  7/8/2019     CT BIOPSY LUNG  10/30/2019     HYSTERECTOMY  age 30     MEDIASTINOSCOPY N/A 8/8/2019    Procedure: MEDIASTINOSCOPY;  Surgeon: Bernard Rosado MD;  Location: [facility];  Service: Cardiovascular     OOPHORECTOMY  age 30     IL LAP,CHOLECYSTECTOMY N/A 6/3/2019    Procedure: CHOLECYSTECTOMY, LAPAROSCOPIC;  Surgeon: Samson Cobb MD;  Location: [facility] OR;  Service: General       Physical Exam    Recent Vitals 11/8/2019   Height -   Weight 146 lbs 6 oz   BSA (m2) 1.66 m2   /61   Pulse 66   Temp 98.4   Temp src 1   SpO2 99   Some recent data might be hidden       GENERAL: Alert and oriented to time place and person. Seated comfortably. In no distress.    HEAD: Atraumatic and normocephalic.    EYES: RICKI, EOMI.  No pallor.  No icterus.    Oral cavity: no mucosal lesion or tonsillar enlargement.    NECK: supple. JVP normal.  No thyroid enlargement.    LYMPH NODES: No palpable, cervical, axillary or inguinal lymphadenopathy.    CHEST: clear to auscultation bilaterally.  Resonant to percussion throughout bilaterally.  Symmetrical breath movements bilaterally.    CVS: S1 and S2 are heard. Regular rate and rhythm.  No murmur or gallop or rub heard.  No peripheral edema.    ABDOMEN: Soft. Not tender. Not distended.  No palpable hepatomegaly or splenomegaly.  No other mass palpable.  Bowel sounds heard.    EXTREMITIES: Warm.    SKIN: no rash, or bruising or purpura.  Has a full head of hair.      Lab Results    No results found for this or any previous visit (from the past 168 hour(s)).    Imaging    Xr Chest 1 View Portable    Result Date: 10/30/2019  EXAM: XR CHEST 1 VIEW PORTABLE  LOCATION: [facility] DATE/TIME: 10/30/2019 11:25 AM INDICATION: post R lung bx/fiducial placement COMPARISON: 08/23/2019, 10/30/2019     Fiducial marker and airspace consolidation noted within the right upper lobe compatible with mild hemorrhage following biopsy and fiducial placement. Minimal left upper lobe atelectasis also noted. There is no pneumothorax. The heart size is stable.    Ct Lung Biopsy    Result Date: 10/30/2019  [facility] RADIOLOGY LOCATION: [facility] DATE: 10/30/2019 PROCEDURE: CT-GUIDED RIGHT LUNG LESION BIOPSY. INTERVENTIONAL RADIOLOGIST: Sagar Wynn MD INDICATION: 79-year-old female with right lung mass. CONSENT: The risks, benefits and alternatives of the procedure were discussed with the patient  in detail. All questions were answered. Informed consent was given to proceed with the procedure. MODERATE SEDATION: Versed 1 mg IV; Fentanyl 50 mcg IV.  Under physician supervision, Versed and fentanyl were administered for moderate sedation. Pulse oximetry, heart rate and blood pressure were continuously monitored by an independent trained observer. The physician spent 30 minutes of face-to-face sedation time with the patient. CONTRAST: None. ANTIBIOTICS: None. ADDITIONAL MEDICATIONS: None. FLUOROSCOPIC TIME: None. minutes of CT fluoroscopy. Dose reduction techniques were utilized during the procedure. DOSE LENGTH PRODUCT:  1045 mGy-cm. COMPLICATIONS: No immediate complications. STERILE BARRIER TECHNIQUE: Maximum sterile barrier technique was used. Cutaneous antisepsis was performed at the operative site with application of 2% chlorhexidine and large sterile drape. Prior to the procedure, the  and assistant performed hand hygiene and wore hat, mask, sterile gown, and sterile gloves during the entire procedure. PROCEDURE/TECHNIQUE:  A limited nonenhanced CT study was performed for localization purposes. Note was made of a small right upper lobe lung mass. Using  intermittent CT guidance, a 19-gauge introducer needle was inserted from a posterior approach and directed to the mass. Using coaxial technique, through the introducer needle, a total of 3 20-gauge, 2 cm core biopsy specimens were obtained and reviewed for adequacy by the pathologist. With the needle confirmed to lie within the central aspect of the mass a fiducial marker was placed. The needle was retracted slightly and repeat imaging demonstrated satisfactory position of the fiducial within the central aspect of the mass. The needle was removed and a sterile dressing was applied. A limited nonenhanced CT study was performed to evaluate for periprocedural hemorrhage or pneumothorax. FINDINGS: Images obtained during needle localization show the introducer needle within the right upper lobe mass. The completion images show evidence of expected intraparenchymal hemorrhage without pneumothorax. The fiducial marker was noted to lie within the right upper lobe mass.     Successful right upper lobe lung mass biopsy and fiducial placement, as detailed above. CPT codes for physician reference only: 80460 [id number]     Ct Fiducial Placement Lung    Result Date: 10/30/2019  [facility] RADIOLOGY LOCATION: [facility] DATE: 10/30/2019 PROCEDURE: CT-GUIDED RIGHT LUNG LESION BIOPSY. INTERVENTIONAL RADIOLOGIST: Sagar Wynn MD INDICATION: 79-year-old female with right lung mass. CONSENT: The risks, benefits and alternatives of the procedure were discussed with the patient  in detail. All questions were answered. Informed consent was given to proceed with the procedure. MODERATE SEDATION: Versed 1 mg IV; Fentanyl 50 mcg IV.  Under physician supervision, Versed and fentanyl were administered for moderate sedation. Pulse oximetry, heart rate and blood pressure were continuously monitored by an independent trained observer. The physician spent 30 minutes of face-to-face sedation time with the patient.  CONTRAST: None. ANTIBIOTICS: None. ADDITIONAL MEDICATIONS: None. FLUOROSCOPIC TIME: None. minutes of CT fluoroscopy. Dose reduction techniques were utilized during the procedure. DOSE LENGTH PRODUCT:  1045 mGy-cm. COMPLICATIONS: No immediate complications. STERILE BARRIER TECHNIQUE: Maximum sterile barrier technique was used. Cutaneous antisepsis was performed at the operative site with application of 2% chlorhexidine and large sterile drape. Prior to the procedure, the  and assistant performed hand hygiene and wore hat, mask, sterile gown, and sterile gloves during the entire procedure. PROCEDURE/TECHNIQUE:  A limited nonenhanced CT study was performed for localization purposes. Note was made of a small right upper lobe lung mass. Using intermittent CT guidance, a 19-gauge introducer needle was inserted from a posterior approach and directed to the mass. Using coaxial technique, through the introducer needle, a total of 3 20-gauge, 2 cm core biopsy specimens were obtained and reviewed for adequacy by the pathologist. With the needle confirmed to lie within the central aspect of the mass a fiducial marker was placed. The needle was retracted slightly and repeat imaging demonstrated satisfactory position of the fiducial within the central aspect of the mass. The needle was removed and a sterile dressing was applied. A limited nonenhanced CT study was performed to evaluate for periprocedural hemorrhage or pneumothorax. FINDINGS: Images obtained during needle localization show the introducer needle within the right upper lobe mass. The completion images show evidence of expected intraparenchymal hemorrhage without pneumothorax. The fiducial marker was noted to lie within the right upper lobe mass.     Successful right upper lobe lung mass biopsy and fiducial placement, as detailed above. CPT codes for physician reference only: [id number]         Signed by: Quintin Niño MD

## 2021-06-03 NOTE — PROGRESS NOTES
[facility] Hematology and Oncology Progress Note    Patient: Gabriela Watkins  MRN: [id number]  Date of Service: 12/02/2019        Reason for Visit    Chief Complaint   Patient presents with     HE Cancer     Small cell lung cancer, right lower lobe       Assessment and Plan  Cancer Staging  Primary lung adenocarcinoma, left (H)  Staging form: Lung, AJCC 8th Edition  - Clinical stage from 10/15/2019: Stage IIB (cT3, cN0, cM0) - Signed by Quintin Niño MD on 10/15/2019  PDL1 negative, KRAS mutation    1. Lung cancer, stage IIB, LEFT lung: s/p trisegmentectomy on 8/30/19. Continue to monitor on scans.     2. Lung cancer, small cell, limited stage, RIGHT lung: pt is here today to start treatment. She will get sequential therapy due to age and comorbidities.  We will give a 25% reduction for the first cycle of the carboplatin and etoposide.  She will return in 10 days for midcycle check.  The chemo will be given every 3 weeks.  We will add in Neulasta due to high risk for neutropenic fevers and high risk for complications due to her age.  I did have patient attend chemo class.  She had already signed a consent with Dr. Niño and felt educated on the expected side effects of the treatment.    3.  Mild anemia: This is normocytic.  Likely from chronic disease and cancer.  We will continue to monitor especially with chemotherapy.      ECOG Performance   ECOG Performance Status: 2     Distress Assessment  Distress Assessment Score: No distress    Pain  Currently in Pain: No/denies        Problem List    1. Small cell lung cancer, right lower lobe (H)  CC OFFICE VISIT LONG    Infusion Appointment    DISCONTINUED: sodium chloride 0.9% 250 mL infusion    DISCONTINUED: ondansetron 16 mg, dexamethasone 12 mg in sodium chloride 0.9% 25 mL IVPB    DISCONTINUED: etoposide 120 mg in sodium chloride 0.9% 500 mL (TOPOSAR)    DISCONTINUED: sodium chloride flush 20 mL (NS)    DISCONTINUED: heparin lockflush (PF)  porcine 300-600 Units    DISCONTINUED: diphenhydrAMINE injection 50 mg (BENADRYL)    DISCONTINUED: famotidine 20 mg/2 mL injection 20 mg (PEPCID)    DISCONTINUED: hydrocortisone sod succ (PF) injection 100 mg    DISCONTINUED: acetaminophen tablet 1,000 mg (TYLENOL)    DISCONTINUED: sodium chloride 0.9% 500 mL    DISCONTINUED: CARBOplatin 300 mg in dextrose 5% 250 mL (PARAPLATIN)        ______________________________________________________________________________    History of Present Illness    Measurable disease: CT of the chest, PET scan    Current therapy: Patient is here today to start carboplatin and etoposide    Interim history: Patient is here today to start chemo.  She states she is pretty nervous to start chemo but other than that she is feeling okay.  She denies pain.  Denies any respiratory symptoms.    Pain Status  Currently in Pain: No/denies    Review of Systems    Oncology Nurse Assessment/CMA Intake: Constitutional  Constitutional (WDL): Exceptions to WDL  Fatigue: Concerns(relieved with rest)  Neurosensory  Neurosensory (WDL): All neurosensory elements are within defined limits  Eye   Eye Disorder (WDL): All eye disorder elements are within defined limits  Ear  Ear Disorder (WDL): All ear disorder elements are within defined limits  Cardiovascular  Cardiovascular (WDL): All cardiovascular elements are within defined limits  Pulmonary  Respiratory (WDL): Exceptions to WDL  Cough: Concerns(dry air)  Gastrointestinal  Gastrointestinal (WDL): Exceptions to WDL  Dehydration: Concerns(should be drinking more)  Genitourinary  Genitourinary (WDL): All genitourinary elements are within defined limits  Lymphatic  Lymph (WDL): All lymph disorder elements are within defined limits  Musculoskeletal and Connective Tissue  Musculoskeletal and Connetive Tissue Disorders (WDL): Exceptions to WDL  Bone Pain: Concerns(back from disc )  Integumentary  Integumentary (WDL): Exceptions to WDL(bruise on right leg  ")  Patient Coping  Patient Coping: Accepting;Open/discussion  Accompanied by  Accompanied by: Family Member  Oral Chemo Adherence         Past History  Past Medical History:   Diagnosis Date     Acute gangrenous cholecystitis      Arthritis      Cancer (H)      Cerebral aneurysm, unruptured     11mm     Dyslipidemia      Hypertension      Lung nodules      Osteoarthritis      Primary adenocarcinoma of upper lobe of left lung      Short-term memory loss      Stroke (H)        PHYSICAL EXAM:  /60   Pulse 64   Temp 97.6  F (36.4  C) (Oral)   Ht 4' 11\" (1.499 m)   Wt 146 lb 3.2 oz (66.3 kg)   SpO2 98%   BMI 29.53 kg/m    GENERAL: no acute distress. Cooperative in conversation. Here with family    Lab Results    Recent Results (from the past 168 hour(s))   Magnesium   Result Value Ref Range    Magnesium 1.9 1.8 - 2.6 mg/dL   Comprehensive Metabolic Panel   Result Value Ref Range    Sodium 139 136 - 145 mmol/L    Potassium 3.8 3.5 - 5.0 mmol/L    Chloride 107 98 - 107 mmol/L    CO2 23 22 - 31 mmol/L    Anion Gap, Calculation 9 5 - 18 mmol/L    Glucose 104 70 - 125 mg/dL    BUN 35 (H) 8 - 28 mg/dL    Creatinine 1.09 0.60 - 1.10 mg/dL    GFR MDRD Af Amer 58 (L) >60 mL/min/1.73m2    GFR MDRD Non Af Amer 48 (L) >60 mL/min/1.73m2    Bilirubin, Total 0.3 0.0 - 1.0 mg/dL    Calcium 9.4 8.5 - 10.5 mg/dL    Protein, Total 7.1 6.0 - 8.0 g/dL    Albumin 3.8 3.5 - 5.0 g/dL    Alkaline Phosphatase 106 45 - 120 U/L    AST 15 0 - 40 U/L    ALT 14 0 - 45 U/L   HM1 (CBC with Diff)   Result Value Ref Range    WBC 8.9 4.0 - 11.0 thou/uL    RBC 3.75 (L) 3.80 - 5.40 mill/uL    Hemoglobin 10.2 (L) 12.0 - 16.0 g/dL    Hematocrit 32.3 (L) 35.0 - 47.0 %    MCV 86 80 - 100 fL    MCH 27.2 27.0 - 34.0 pg    MCHC 31.6 (L) 32.0 - 36.0 g/dL    RDW 14.0 11.0 - 14.5 %    Platelets 257 140 - 440 thou/uL    MPV 10.1 8.5 - 12.5 fL    Neutrophils % 75 (H) 50 - 70 %    Lymphocytes % 14 (L) 20 - 40 %    Monocytes % 9 2 - 10 %    Eosinophils % 1 0 - " 6 %    Basophils % 0 0 - 2 %    Neutrophils Absolute 6.7 2.0 - 7.7 thou/uL    Lymphocytes Absolute 1.3 0.8 - 4.4 thou/uL    Monocytes Absolute 0.8 0.0 - 0.9 thou/uL    Eosinophils Absolute 0.1 0.0 - 0.4 thou/uL    Basophils Absolute 0.0 0.0 - 0.2 thou/uL       Imaging    Mr Brain With Without Contrast    Result Date: 11/12/2019  EXAM: MR BRAIN W WO CONTRAST LOCATION: [facility] DATE/TIME: 11/11/2019 3:57 PM INDICATION: Recent infarct, history of small cell lung cancer. COMPARISON: MRI 06/04/2019, CTA 06/06/2019 CONTRAST: Gadavist 7mL TECHNIQUE: Routine multiplanar multisequence head MRI without and with intravenous contrast. FINDINGS: INTRACRANIAL CONTENTS: Expected evolutionary change of the left parietal paramedian cortical and subcortical infarct as well as parietal-occipital cortical and subcortical infarct with encephalomalacia, gliosis, and early changes of laminar necrosis. No evidence of new acute ischemic change. No mass, acute hemorrhage, or extra-axial fluid collections. Scattered nonspecific T2/FLAIR hyperintensities within the cerebral white matter most consistent with mild chronic microvascular ischemic change. Mild generalized cerebral atrophy. No hydrocephalus. Normal position of the cerebellar tonsils. No pathologic parenchymal contrast enhancement. SELLA: No abnormality accounting for technique. OSSEOUS STRUCTURES/SOFT TISSUES: Normal marrow signal. Again seen is the rounded extra-axial 11 mm enhancing masslike structure at the right MCA bifurcation consistent with known partially calcified aneurysm. The major intracranial vascular flow voids are maintained, asymmetric carotid siphons, left small compared to the right, unchanged. ORBITS: Prior bilateral cataract surgery. Visualized portions of the orbits are otherwise unremarkable. SINUSES/MASTOIDS: There is also opacification and mucosal thickening in the left side of the sphenoid sinus, otherwise the remainder of the paranasal sinuses  are essentially clear. Near-complete left and moderate right mastoid opacification and inspissated secretions. No apparent mass in the posterior nasopharynx or skull base.     1.  No new acute intracranial pathology. 2.  Expected evolutionary change of left parietal infarcts with laminar necrosis, gliosis and encephalomalacia. 3.  Stable right MCA bifurcation 11 mm aneurysm. 4.  Diminished caliber left carotid siphon compared to right, unchanged. 5.  Left sphenoid sinus disease and bilateral mastoid air cell opacification left greater than right.    Ct Chest Abdomen Pelvis Without Oral With Iv Contrast    Result Date: 11/11/2019  EXAM: CT CHEST ABDOMEN PELVIS WO ORAL W IV CONTRAST LOCATION: [facility] DATE/TIME: 11/11/2019 4:22 PM INDICATION: Restage lung cancer, right, small cell. Left upper lobe adenocarcinoma. Right upper lobe small cell. COMPARISON: 08/23/2019 and 06/30/2019 TECHNIQUE: CT scan of the chest, abdomen, and pelvis was performed following injection of IV contrast. Multiplanar reformats were obtained. Dose reduction techniques were used. CONTRAST: Iohexol (Omni) 75mL FINDINGS: LUNGS AND PLEURA: Left upper lobe resection noted. There is a small amount of left pleural fluid. A lobulated right upper lobe nodule is 2.5 x 2.1 cm on MIP imaging (previously 2.3 x 1.7 cm on the comparison study).  The left hemidiaphragm is mildly elevated. MEDIASTINUM/AXILLAE: Normal size of the heart. Normal esophagus. There is a 12 mm right hilar lymph node. There is atherosclerotic disease with soft and calcified atheromatous plaque. HEPATOBILIARY: Cholecystectomy. There is mild bile duct dilatation. A 12 mm low-attenuation area in the lateral left hepatic lobe is seen near the falciform ligament (series 4 image 227). PANCREAS: Normal. SPLEEN: Normal. ADRENAL GLANDS: Normal. KIDNEYS/BLADDER: Renal lesions are seen on the right and left. Some of these are intermediate in attenuation. No hydronephrosis or  hydroureter. BOWEL: Normal appearance of the stomach. There is a 10 mm high attenuation focus in the third portion of the duodenum (series 4 image 285). The small bowel is normal in caliber. Normal appendix. There is colonic diverticulosis. LYMPH NODES: Normal. VASCULATURE: There is atherosclerotic disease with soft and calcified atheromatous plaque. PELVIC ORGANS: Prior hysterectomy. No adnexal masses. OTHER: None. MUSCULOSKELETAL: There are degenerative changes at the right hip and in the spine.     1.  Left upper lobe resection. A lobulated right upper lobe nodule has slightly increased in size from the comparison study. There is an abnormal 12 mm right hilar lymph node. 2.  A low-attenuation liver lesion has not significantly changed and may be related to bile duct dilatation. Prior cholecystectomy is noted. 3.  Multiple renal lesions do not meet criteria for simple cysts. These would be better assessed with renal mass protocol CT or MRI. 4.  There is advanced atherosclerotic disease.   Total time spent with patient was 20 minutes.  Greater than 50% of that was in counseling and care coordination.        Signed by: Sharita Meyers CNP

## 2021-06-03 NOTE — PROGRESS NOTES
[facility] Hematology and Oncology Progress Note    Patient: Gabriela Watkins  MRN: [id number]  Date of Service: 11/15/2019        Reason for Visit    Chief Complaint   Patient presents with     HE Cancer     Primary lung adenocarcinoma       Assessment and Plan    T3 N0 M0, stage IIb adenocarcinoma of the left lung status post trisegmentectomy on August 30, 2019  Tumor is PDL 1 negative; K-wilberto mutated, no other alterations noted  Right upper lobe lung nodule, biopsy shows small cell lung cancer diagnosis in October 2019  History of smoking    CT chest reviewed showing right lung mass with new hilar adenopathy.  No evidence of brain metastases.  Appears to have limited stage small cell lung cancer on the right.  Discussed treatment options and would recommend sequential chemotherapy possibly followed by radiation because of her age.    Discussed initial treatment with carboplatin and etoposide for 4 cycles.  Reviewed how the treatment is administered.  Reviewed potential side effects including but not limited to alopecia, fatigue, nausea and vomiting, myelosuppression with risk for infection and possible need for transfusions.  Patient understands and is willing to proceed and did sign a consent.    Discussed use of Compazine for nausea.  Discussed fever precautions and the need to call immediately for temperature greater than 100.5  F.  Discussed use of Neulasta for decreasing risk for neutropenic fever.  Discussed the treatment plan to is with curative intent.    40 minutes spent majority in counseling and coordination of care.    Plan:  prescription for Compazine  Chemotherapy class  Return December 2 for lab work, visit and first day of chemotherapy  Will dose reduce both drugs to 75% of full dose for first cycle    Measurable disease: CT of the chest    Current therapy: Observation            Cancer Staging  Primary lung adenocarcinoma, left (H)  Staging form: Lung, AJCC 8th Edition  - Clinical stage  from 10/15/2019: Stage IIB (cT3, cN0, cM0) - Signed by Quintin Niño MD on 10/15/2019      ECOG Performance   ECOG Performance Status: 2    Distress Assessment  Distress Assessment Score: No distress    Pain         Problem List    1. Small cell lung cancer, right lower lobe (H)  Education (Chemo Class)    prochlorperazine (COMPAZINE) 10 MG tablet        CC: Kirsty Rainey PA-C    ______________________________________________________________________________    History of Present Illness    Ms. Gabriela Watkins returns for reevaluation.  She had CT of the brain and CT of the chest and is here to review results.  No new symptoms or problems.  ECOG status is 1.  No major cardiac or pulmonary issues.  Pain Status  Currently in Pain: No/denies    Review of Systems    Constitutional  Constitutional (WDL): Exceptions to WDL  Fatigue: Fatigue relieved by rest  Neurosensory  Neurosensory (WDL): Exceptions to WDL  Ataxia: Asymptomatic, clinical or diagnostic observations only, intervention not indicated  Cardiovascular  Cardiovascular (WDL): Exceptions to WDL(Hx: A fib. )  Edema: Yes(Rt ankle; some swelling. )  Pulmonary  Respiratory (WDL): Within Defined Limits  Gastrointestinal  Gastrointestinal (WDL): Exceptions to WDL  Anorexia: Loss of appetite without alteration in eating habits  Genitourinary  Genitourinary (WDL): All genitourinary elements are within defined limits  Integumentary  Integumentary (WDL): All integumentary elements are within defined limits  Patient Coping  Patient Coping: Accepting  Distress Assessment  Distress Assessment Score: No distress  Accompanied by  Accompanied by: Family Member    Past History  Past Medical History:   Diagnosis Date     Acute gangrenous cholecystitis      Arthritis      Cancer (H)      Cerebral aneurysm, unruptured     11mm     Dyslipidemia      Hypertension      Lung nodules      Osteoarthritis      Primary adenocarcinoma of upper lobe of left lung       Short-term memory loss      Stroke (H)          Past Surgical History:   Procedure Laterality Date     BLADDER SURGERY       CATARACT EXTRACTION       CT BIOPSY LUNG  7/8/2019     CT BIOPSY LUNG  10/30/2019     HYSTERECTOMY  age 30     MEDIASTINOSCOPY N/A 8/8/2019    Procedure: MEDIASTINOSCOPY;  Surgeon: Bernard Rosado MD;  Location: [facility];  Service: Cardiovascular     OOPHORECTOMY  age 30     RI LAP,CHOLECYSTECTOMY N/A 6/3/2019    Procedure: CHOLECYSTECTOMY, LAPAROSCOPIC;  Surgeon: Samson Cobb MD;  Location: [facility] OR;  Service: General       Physical Exam    Recent Vitals 11/15/2019   Height -   Weight 144 lbs 11 oz   BSA (m2) 1.65 m2   /57   Pulse 54   Temp 97.8   Temp src 1   SpO2 100   Some recent data might be hidden       GENERAL: Alert and oriented to time place and person. Seated comfortably. In no distress.    HEAD: Atraumatic and normocephalic.    EYES: RICKI, EOMI.  No pallor.  No icterus.    Oral cavity: no mucosal lesion or tonsillar enlargement.    NECK: supple. JVP normal.  No thyroid enlargement.    LYMPH NODES: No palpable, cervical, axillary or inguinal lymphadenopathy.    CHEST: clear to auscultation bilaterally.  Resonant to percussion throughout bilaterally.  Symmetrical breath movements bilaterally.    CVS: S1 and S2 are heard. Regular rate and rhythm.  No murmur or gallop or rub heard.  No peripheral edema.    ABDOMEN: Soft. Not tender. Not distended.  No palpable hepatomegaly or splenomegaly.  No other mass palpable.  Bowel sounds heard.    EXTREMITIES: Warm.    SKIN: no rash, or bruising or purpura.  Has a full head of hair.      Lab Results    Recent Results (from the past 168 hour(s))   POCT CREATININE   Result Value Ref Range    iSTAT Creatinine 1.0 0.6 - 1.1 mg/dL    iSTAT GFR MDRD Af Amer >60 >60 mL/min/1.73m2    iSTAT GFR MDRD Non Af Amer 53 (L) >60 mL/min/1.73m2       Imaging    Xr Chest 1 View Portable    Result Date: 10/30/2019  EXAM: XR  CHEST 1 VIEW PORTABLE LOCATION: [facility] DATE/TIME: 10/30/2019 11:25 AM INDICATION: post R lung bx/fiducial placement COMPARISON: 08/23/2019, 10/30/2019     Fiducial marker and airspace consolidation noted within the right upper lobe compatible with mild hemorrhage following biopsy and fiducial placement. Minimal left upper lobe atelectasis also noted. There is no pneumothorax. The heart size is stable.    Mr Brain With Without Contrast    Result Date: 11/12/2019  EXAM: MR BRAIN W WO CONTRAST LOCATION: [facility] DATE/TIME: 11/11/2019 3:57 PM INDICATION: Recent infarct, history of small cell lung cancer. COMPARISON: MRI 06/04/2019, CTA 06/06/2019 CONTRAST: Gadavist 7mL TECHNIQUE: Routine multiplanar multisequence head MRI without and with intravenous contrast. FINDINGS: INTRACRANIAL CONTENTS: Expected evolutionary change of the left parietal paramedian cortical and subcortical infarct as well as parietal-occipital cortical and subcortical infarct with encephalomalacia, gliosis, and early changes of laminar necrosis. No evidence of new acute ischemic change. No mass, acute hemorrhage, or extra-axial fluid collections. Scattered nonspecific T2/FLAIR hyperintensities within the cerebral white matter most consistent with mild chronic microvascular ischemic change. Mild generalized cerebral atrophy. No hydrocephalus. Normal position of the cerebellar tonsils. No pathologic parenchymal contrast enhancement. SELLA: No abnormality accounting for technique. OSSEOUS STRUCTURES/SOFT TISSUES: Normal marrow signal. Again seen is the rounded extra-axial 11 mm enhancing masslike structure at the right MCA bifurcation consistent with known partially calcified aneurysm. The major intracranial vascular flow voids are maintained, asymmetric carotid siphons, left small compared to the right, unchanged. ORBITS: Prior bilateral cataract surgery. Visualized portions of the orbits are otherwise unremarkable.  SINUSES/MASTOIDS: There is also opacification and mucosal thickening in the left side of the sphenoid sinus, otherwise the remainder of the paranasal sinuses are essentially clear. Near-complete left and moderate right mastoid opacification and inspissated secretions. No apparent mass in the posterior nasopharynx or skull base.     1.  No new acute intracranial pathology. 2.  Expected evolutionary change of left parietal infarcts with laminar necrosis, gliosis and encephalomalacia. 3.  Stable right MCA bifurcation 11 mm aneurysm. 4.  Diminished caliber left carotid siphon compared to right, unchanged. 5.  Left sphenoid sinus disease and bilateral mastoid air cell opacification left greater than right.    Ct Lung Biopsy    Result Date: 10/30/2019  [facility] RADIOLOGY LOCATION: [facility] DATE: 10/30/2019 PROCEDURE: CT-GUIDED RIGHT LUNG LESION BIOPSY. INTERVENTIONAL RADIOLOGIST: Sagar Wynn MD INDICATION: 79-year-old female with right lung mass. CONSENT: The risks, benefits and alternatives of the procedure were discussed with the patient  in detail. All questions were answered. Informed consent was given to proceed with the procedure. MODERATE SEDATION: Versed 1 mg IV; Fentanyl 50 mcg IV.  Under physician supervision, Versed and fentanyl were administered for moderate sedation. Pulse oximetry, heart rate and blood pressure were continuously monitored by an independent trained observer. The physician spent 30 minutes of face-to-face sedation time with the patient. CONTRAST: None. ANTIBIOTICS: None. ADDITIONAL MEDICATIONS: None. FLUOROSCOPIC TIME: None. minutes of CT fluoroscopy. Dose reduction techniques were utilized during the procedure. DOSE LENGTH PRODUCT:  1045 mGy-cm. COMPLICATIONS: No immediate complications. STERILE BARRIER TECHNIQUE: Maximum sterile barrier technique was used. Cutaneous antisepsis was performed at the operative site with application of 2% chlorhexidine and large sterile drape.  Prior to the procedure, the  and assistant performed hand hygiene and wore hat, mask, sterile gown, and sterile gloves during the entire procedure. PROCEDURE/TECHNIQUE:  A limited nonenhanced CT study was performed for localization purposes. Note was made of a small right upper lobe lung mass. Using intermittent CT guidance, a 19-gauge introducer needle was inserted from a posterior approach and directed to the mass. Using coaxial technique, through the introducer needle, a total of 3 20-gauge, 2 cm core biopsy specimens were obtained and reviewed for adequacy by the pathologist. With the needle confirmed to lie within the central aspect of the mass a fiducial marker was placed. The needle was retracted slightly and repeat imaging demonstrated satisfactory position of the fiducial within the central aspect of the mass. The needle was removed and a sterile dressing was applied. A limited nonenhanced CT study was performed to evaluate for periprocedural hemorrhage or pneumothorax. FINDINGS: Images obtained during needle localization show the introducer needle within the right upper lobe mass. The completion images show evidence of expected intraparenchymal hemorrhage without pneumothorax. The fiducial marker was noted to lie within the right upper lobe mass.     Successful right upper lobe lung mass biopsy and fiducial placement, as detailed above. CPT codes for physician reference only: 89706 [id number]     Ct Chest Abdomen Pelvis Without Oral With Iv Contrast    Result Date: 11/11/2019  EXAM: CT CHEST ABDOMEN PELVIS WO ORAL W IV CONTRAST LOCATION: [facility] DATE/TIME: 11/11/2019 4:22 PM INDICATION: Restage lung cancer, right, small cell. Left upper lobe adenocarcinoma. Right upper lobe small cell. COMPARISON: 08/23/2019 and 06/30/2019 TECHNIQUE: CT scan of the chest, abdomen, and pelvis was performed following injection of IV contrast. Multiplanar reformats were obtained. Dose  reduction techniques were used. CONTRAST: Iohexol (Omni) 75mL FINDINGS: LUNGS AND PLEURA: Left upper lobe resection noted. There is a small amount of left pleural fluid. A lobulated right upper lobe nodule is 2.5 x 2.1 cm on MIP imaging (previously 2.3 x 1.7 cm on the comparison study).  The left hemidiaphragm is mildly elevated. MEDIASTINUM/AXILLAE: Normal size of the heart. Normal esophagus. There is a 12 mm right hilar lymph node. There is atherosclerotic disease with soft and calcified atheromatous plaque. HEPATOBILIARY: Cholecystectomy. There is mild bile duct dilatation. A 12 mm low-attenuation area in the lateral left hepatic lobe is seen near the falciform ligament (series 4 image 227). PANCREAS: Normal. SPLEEN: Normal. ADRENAL GLANDS: Normal. KIDNEYS/BLADDER: Renal lesions are seen on the right and left. Some of these are intermediate in attenuation. No hydronephrosis or hydroureter. BOWEL: Normal appearance of the stomach. There is a 10 mm high attenuation focus in the third portion of the duodenum (series 4 image 285). The small bowel is normal in caliber. Normal appendix. There is colonic diverticulosis. LYMPH NODES: Normal. VASCULATURE: There is atherosclerotic disease with soft and calcified atheromatous plaque. PELVIC ORGANS: Prior hysterectomy. No adnexal masses. OTHER: None. MUSCULOSKELETAL: There are degenerative changes at the right hip and in the spine.     1.  Left upper lobe resection. A lobulated right upper lobe nodule has slightly increased in size from the comparison study. There is an abnormal 12 mm right hilar lymph node. 2.  A low-attenuation liver lesion has not significantly changed and may be related to bile duct dilatation. Prior cholecystectomy is noted. 3.  Multiple renal lesions do not meet criteria for simple cysts. These would be better assessed with renal mass protocol CT or MRI. 4.  There is advanced atherosclerotic disease.     Ct Fiducial Placement Lung    Result Date:  10/30/2019  [facility] RADIOLOGY LOCATION: [facility] DATE: 10/30/2019 PROCEDURE: CT-GUIDED RIGHT LUNG LESION BIOPSY. INTERVENTIONAL RADIOLOGIST: Sagar Wynn MD INDICATION: 79-year-old female with right lung mass. CONSENT: The risks, benefits and alternatives of the procedure were discussed with the patient  in detail. All questions were answered. Informed consent was given to proceed with the procedure. MODERATE SEDATION: Versed 1 mg IV; Fentanyl 50 mcg IV.  Under physician supervision, Versed and fentanyl were administered for moderate sedation. Pulse oximetry, heart rate and blood pressure were continuously monitored by an independent trained observer. The physician spent 30 minutes of face-to-face sedation time with the patient. CONTRAST: None. ANTIBIOTICS: None. ADDITIONAL MEDICATIONS: None. FLUOROSCOPIC TIME: None. minutes of CT fluoroscopy. Dose reduction techniques were utilized during the procedure. DOSE LENGTH PRODUCT:  1045 mGy-cm. COMPLICATIONS: No immediate complications. STERILE BARRIER TECHNIQUE: Maximum sterile barrier technique was used. Cutaneous antisepsis was performed at the operative site with application of 2% chlorhexidine and large sterile drape. Prior to the procedure, the  and assistant performed hand hygiene and wore hat, mask, sterile gown, and sterile gloves during the entire procedure. PROCEDURE/TECHNIQUE:  A limited nonenhanced CT study was performed for localization purposes. Note was made of a small right upper lobe lung mass. Using intermittent CT guidance, a 19-gauge introducer needle was inserted from a posterior approach and directed to the mass. Using coaxial technique, through the introducer needle, a total of 3 20-gauge, 2 cm core biopsy specimens were obtained and reviewed for adequacy by the pathologist. With the needle confirmed to lie within the central aspect of the mass a fiducial marker was placed. The needle was retracted slightly and repeat  imaging demonstrated satisfactory position of the fiducial within the central aspect of the mass. The needle was removed and a sterile dressing was applied. A limited nonenhanced CT study was performed to evaluate for periprocedural hemorrhage or pneumothorax. FINDINGS: Images obtained during needle localization show the introducer needle within the right upper lobe mass. The completion images show evidence of expected intraparenchymal hemorrhage without pneumothorax. The fiducial marker was noted to lie within the right upper lobe mass.     Successful right upper lobe lung mass biopsy and fiducial placement, as detailed above. CPT codes for physician reference only: [id number]         Signed by: Quintin Niño MD

## 2021-06-03 NOTE — PROGRESS NOTES
The patient attended chemotherapy class today.  The patient was educated on:  -[facility] Educational Classes and Support Groups  -Chemotherapy: what it is and how it works  -Described a typical day at the treatment center and what the patient can expect  -Discussed port access and functions of the port   -Chemotherapy side effects and appropriate management of these side effects. SE discussed included and not limited to: Fatigue, nausea and vomiting, constipation, diarrhea, mucositis, alopecia, peripheral neuropathy, pain, anemia, thrombocytopenia, and neutropenia.    -Reasons to call the physician, including, fever>100.5, shaking chills, unusual bleeding or bruising, shortness of breath, vomiting>12hours, nausea >24 hours, unable to eat/drink >24 hours, painful urination, blood in urine or stool, soreness at the IV site, and painful mouth sores.  The  triage phone number was given to the patient and the patient was encouraged to call with any questions.  The patient was given a tour of the infusion center.  All questions were addressed to the best of my abilities and the patient was encouraged to call with any questions.  Time Spent:60 minutes    Coretta Delgado, Maria AD

## 2021-06-04 NOTE — TELEPHONE ENCOUNTER
----- Message from Nicole Hguhes CMA sent at 12/2/2019  1:22 PM CST -----  Regarding: Post chemo check in  Patient receiving chemo 12/2-12/4

## 2021-06-04 NOTE — PROGRESS NOTES
Pt arrived ambulatory to clinic for Cycle # 1 Day # 3 of her chemotherapy regimen.  Pt states that she felt good after yesterday's treatment.   IV flushed easily with excellent blood return.  Administered premedication and chemotherapy per MD order.  Pt tolerated infusion well, no s/s of infusion reaction.  IV was flushed with NS then D/C'd using 2x2 and Coban.  Pt did not want to try Neulasta patch today.  Pt has to come into clinic tomorrow for Cardiology.  Pt was educated on Neulasta medication and provided written information.  Pt will come tomorrow after appt with Dr. Magdaleno.  Pt and family verbalized understanding of plan of care and return to clinic.

## 2021-06-04 NOTE — PATIENT INSTRUCTIONS - HE
Patient Education     Pegfilgrastim Solution for injection  What is this medicine?  PEGFILGRASTIM (peg shannan GRA stim) is a long-acting granulocyte colony-stimulating factor that stimulates the growth of neutrophils, a type of white blood cell important in the body s fight against infection. It is used to reduce the incidence of fever and infection in patients with certain types of cancer who are receiving chemotherapy that affects the bone marrow.  This medicine may be used for other purposes; ask your health care provider or pharmacist if you have questions.  What should I tell my health care provider before I take this medicine?  They need to know if you have any of these conditions:    latex allergy    ongoing radiation therapy    sickle cell disease    skin reactions to acrylic adhesives (On-Body Injector only)    an unusual or allergic reaction to pegfilgrastim, filgrastim, other medicines, foods, dyes, or preservatives    pregnant or trying to get pregnant    breast-feeding  How should I use this medicine?  This medicine is for injection under the skin. If you get this medicine at home, you will be taught how to prepare and give the pre-filled syringe or how to use the On-body Injector. Refer to the patient Instructions for Use for detailed instructions. Use exactly as directed. Take your medicine at regular intervals. Do not take your medicine more often than directed.  It is important that you put your used needles and syringes in a special sharps container. Do not put them in a trash can. If you do not have a sharps container, call your pharmacist or healthcare provider to get one.  Talk to your pediatrician regarding the use of this medicine in children. Special care may be needed.  Overdosage: If you think you have taken too much of this medicine contact a poison control center or emergency room at once.  NOTE: This medicine is only for you. Do not share this medicine with others.  What if I miss a  dose?  It is important not to miss your dose. Call your doctor or health care professional if you miss your dose. If you miss a dose due to an On-body Injector failure or leakage, a new dose should be administered as soon as possible using a single prefilled syringe for manual use.  What may interact with this medicine?  Interactions have not been studied.  Give your health care provider a list of all the medicines, herbs, non-prescription drugs, or dietary supplements you use. Also tell them if you smoke, drink alcohol, or use illegal drugs. Some items may interact with your medicine.  What should I watch for while using this medicine?  You may need blood work done while you are taking this medicine.  If you are going to need a MRI, CT scan, or other procedure, tell your doctor that you are using this medicine (On-Body Injector only).  What side effects may I notice from receiving this medicine?  Side effects that you should report to your doctor or health care professional as soon as possible:    allergic reactions like skin rash, itching or hives, swelling of the face, lips, or tongue    dizziness    fever    pain, redness, or irritation at site where injected    pinpoint red spots on the skin    shortness of breath or breathing problems    stomach or side pain, or pain at the shoulder    swelling    tiredness    trouble passing urine  Side effects that usually do not require medical attention (report to your doctor or health care professional if they continue or are bothersome):    bone pain    muscle pain  This list may not describe all possible side effects. Call your doctor for medical advice about side effects. You may report side effects to FDA at 0-406-FDA-7082.  Where should I keep my medicine?  Keep out of the reach of children.  Store pre-filled syringes in a refrigerator between 2 and 8 degrees C (36 and 46 degrees F). Do not freeze. Keep in carton to protect from light. Throw away this medicine if it  is left out of the refrigerator for more than 48 hours. Throw away any unused medicine after the expiration date.  NOTE: This sheet is a summary. It may not cover all possible information. If you have questions about this medicine, talk to your doctor, pharmacist, or health care provider.  NOTE:This sheet is a summary. It may not cover all possible information. If you have questions about this medicine, talk to your doctor, pharmacist, or health care provider. Copyright  2015 [employer]

## 2021-06-04 NOTE — TELEPHONE ENCOUNTER
Called patient and spoke with her daughter Mary Kay who reports patient was doing really well yesterday and took a turn today with having side effects of lightheaded,nausea and sinus congestion. I advised Mary Kay that patient needs to drink fluids as she hasn't been drinking the amount she normally has because of the nausea. Nausea medication was taken and provided relief so I encouraged her to drink while she can to prevent ER visit for dehydration. Also relayed that taking OTC decongestant would be safe for her to take for the sinus congestion. Mary Kay is aware of thenumber to contact after hours and into the weekend if Sandra started to worsen with symptoms. Mary Kay verbalized understanding and appreciation of call. Nicole Hughes, CMA

## 2021-06-04 NOTE — PROGRESS NOTES
Sandra came to chemo infusion this morning for day 2 treatment with Etoposide.  VSS.  Pt assessed and she is feeling well.  IV placed yesterday was assessed and found patent with good blood return. Premedication and Etoposide infused as ordered and was well tolerated while in clinic.  IV was flushed with ns then d/c'd.  Pt d/c from clinic ambulatory accompanied by her daughter.  She will return Thursday for day 3.

## 2021-06-04 NOTE — PROGRESS NOTES
[facility] Hematology and Oncology Progress Note    Patient: Gabriela Watkins  MRN: [id number]  Date of Service: 12/23/2019        Reason for Visit    1. Limited stage right small cell lung cancer, on single-modality chemotherapy  2. C2D1 carboplatin and etoposide    Assessment and Plan  Cancer Staging  Primary lung adenocarcinoma, left (H)  Staging form: Lung, AJCC 8th Edition  - Clinical stage from 10/15/2019: Stage IIB (cT3, cN0, cM0) - Signed by Quintin Niño MD on 10/15/2019  PDL1 negative, KRAS mutation    1. Lung cancer, non-small cell, stage IIB, LEFT lung:   S/p trisegmentectomy on 8/30/19. Continue to monitor on scans.     2. Lung cancer, small cell, limited stage, RIGHT lung:   Planning single agent chemotherapy, then sequential radiation due to age and comorbidities. She received carboplatin AUC 6 + etoposide at 25% dose with cycle 1. At mid-cycle check, she had severe neutropenia and was feeling pretty rough with fatigue, arthralgias (chemotherapy + Neulasta) and diarrhea. She never had a fever, but did have upper respiratory infectious symptoms so did take a course of Levaquin with improvement. Over the past 7-10 days, she feels much better and back to baseline.     Labwork has recovered.     Will proceed with cycle 2. Will at same dose reductions. She will have this 12/23 (day 1), 12/24 (day 2) and 12/26 (day 4) (due to Christmas holiday). Neulasta will be given.     Counseled to be seen emergently if she has any fevers.    Will reimage in 3 weeks ahead of cycle 3.    3.  Mild anemia:   Normocytic.  Likely from chronic disease and cancer.  We will continue to monitor especially with chemotherapy.    4. Neutropenia:  Had ANC 0.0 at clarence and was treated prophylactically with antibiotics for mild URI symtpoms, never had a fever. Did get Neulasta last cycle. Counts are recovered. Percautions reveiwed.    5.  Diarrhea:   Mild after cycle 1, now resolved. Imodium as needed.    6. Left  shoulder pain  Occurred following Neulasta injection and is getting better with time. No worrisome findings on exam. Heat and or Tylenol if needed with this cycle.      ECOG Performance   ECOG Performance Status: 1     Distress Assessment  Distress Assessment Score: 7    Pain  Currently in Pain: No/denies        ___________________________________________________________________________    History of Present Illness    Measurable disease: CT of the chest, PET scan    Current therapy: Patient has started carboplatin and etoposide    Interim history:   Patient is here for consideration of cycle 2 carboplatin and etoposide.  At mid-cycle evaluation a few weeks ago, she was neutropenic with ANC 0.0 but without fever. She was feeling pretty rough at that time with generalized arthralgias (modest improvement with Tylenol), fatigue and mild diarrhea. She had a cough at the time but no fevers; she was treated with Levaquin. Over past 7-10 days, she feels fully recovered and is doing well. She had a very active week with family, minimal fatigue. Eating and drinking well. No change in respiratory baseline status. Left shoulder at site of Neulasta injection has remained sore, but getting better with time. No erythema nor swelling.    Pain Status  Currently in Pain: No/denies    Review of Systems    Constitutional  Constitutional (WDL): Exceptions to WDL  Fatigue: Concerns(intermittently)  Neurosensory  Neurosensory (WDL): All neurosensory elements are within defined limits  Eye   Eye Disorder (WDL): All eye disorder elements are within defined limits  Ear  Ear Disorder (WDL): All ear disorder elements are within defined limits  Cardiovascular  Cardiovascular (WDL): All cardiovascular elements are within defined limits  Pulmonary  Respiratory (WDL): Within Defined Limits  Gastrointestinal  Gastrointestinal (WDL): All gastrointestinal elements are within defined limits  Genitourinary  Genitourinary (WDL): All genitourinary  "elements are within defined limits  Lymphatic  Lymph (WDL): All lymph disorder elements are within defined limits  Musculoskeletal and Connective Tissue  Musculoskeletal and Connetive Tissue Disorders (WDL): Exceptions to WDL  Arthralgia: Concerns(left shoulder pain)  Integumentary  Integumentary (WDL): All integumentary elements are within defined limits  Patient Coping  Patient Coping: Accepting  Accompanied by  Accompanied by: Family Member  Oral Chemo Adherence         Past History  Past Medical History:   Diagnosis Date     Acute gangrenous cholecystitis      Arthritis      Cancer (H)      Cerebral aneurysm, unruptured     11mm     Dyslipidemia      Hypertension      Lung nodules      Osteoarthritis      Primary adenocarcinoma of upper lobe of left lung      Short-term memory loss      Stroke (H)        PHYSICAL EXAM:  /58   Pulse 71   Temp 97.9  F (36.6  C) (Oral)   Ht 4' 11\" (1.499 m)   Wt 141 lb 12.8 oz (64.3 kg)   SpO2 97%   BMI 28.64 kg/m      GENERAL: no acute distress. Cooperative in conversation. Here with daughter  HEENT: pupils are equal, round and reactive. Oromucosa is clean and intact. No ulcerations or mucositis noted. No bleeding noted.  RESP: lungs are clear bilaterally per auscultation. Regular respiratory rate. No wheezes or rhonchi.  CV: Regular, rate and rhythm. No murmurs.  ABD: soft, nontender. Positive bowel sounds. No organomegaly.   MUSCULOSKELETAL: No lower extremity swelling. Left shoulder without erythema, edema, hematoma and no reproducible pain.  NEURO: non focal. Alert and oriented x3.   PSYCH: within normal limits. No depression or anxiety.  SKIN: warm dry intact   LYMPH: no cervical, supraclavicular lymphadenopathy    Lab Results    Recent Results (from the past 168 hour(s))   Magnesium   Result Value Ref Range    Magnesium 1.8 1.8 - 2.6 mg/dL   Comprehensive Metabolic Panel   Result Value Ref Range    Sodium 142 136 - 145 mmol/L    Potassium 3.6 3.5 - 5.0 mmol/L    " Chloride 106 98 - 107 mmol/L    CO2 27 22 - 31 mmol/L    Anion Gap, Calculation 9 5 - 18 mmol/L    Glucose 141 (H) 70 - 125 mg/dL    BUN 16 8 - 28 mg/dL    Creatinine 0.95 0.60 - 1.10 mg/dL    GFR MDRD Af Amer >60 >60 mL/min/1.73m2    GFR MDRD Non Af Amer 56 (L) >60 mL/min/1.73m2    Bilirubin, Total 0.3 0.0 - 1.0 mg/dL    Calcium 9.0 8.5 - 10.5 mg/dL    Protein, Total 6.6 6.0 - 8.0 g/dL    Albumin 3.0 (L) 3.5 - 5.0 g/dL    Alkaline Phosphatase 84 45 - 120 U/L    AST 13 0 - 40 U/L    ALT 12 0 - 45 U/L   HM1 (CBC with Diff)   Result Value Ref Range    WBC 9.2 4.0 - 11.0 thou/uL    RBC 3.60 (L) 3.80 - 5.40 mill/uL    Hemoglobin 9.3 (L) 12.0 - 16.0 g/dL    Hematocrit 30.8 (L) 35.0 - 47.0 %    MCV 86 80 - 100 fL    MCH 25.8 (L) 27.0 - 34.0 pg    MCHC 30.2 (L) 32.0 - 36.0 g/dL    RDW 17.2 (H) 11.0 - 14.5 %    Platelets 425 140 - 440 thou/uL    MPV 9.3 8.5 - 12.5 fL    Neutrophils % 77 (H) 50 - 70 %    Lymphocytes % 14 (L) 20 - 40 %    Monocytes % 7 2 - 10 %    Eosinophils % 0 0 - 6 %    Basophils % 1 0 - 2 %    Neutrophils Absolute 7.1 2.0 - 7.7 thou/uL    Lymphocytes Absolute 1.3 0.8 - 4.4 thou/uL    Monocytes Absolute 0.6 0.0 - 0.9 thou/uL    Eosinophils Absolute 0.0 0.0 - 0.4 thou/uL    Basophils Absolute 0.1 0.0 - 0.2 thou/uL       Imaging    No results found.        Signed by: Diane Mcnael, CNP

## 2021-06-04 NOTE — PROGRESS NOTES
Sandra came to chemo infusion this morning for day 3 treatment with Etoposide.  VSS.  Pt assessed and she is feeling well.  IV placed and had good blood return. Premedication and Etoposide infused as ordered and was well tolerated while in clinic.  IV was flushed with ns then d/c'd.  Pt d/c from clinic ambulatory accompanied by her son.  She will return tomorrow  for day Neulasta.

## 2021-06-04 NOTE — PROGRESS NOTES
Pt arrived amb for with her daughter for her 1st neulasta shot, vs's Went over neulasta, side effects, , and follow up, Neulasta injection given in lt arm, sienna well pt observed for 10 min, Pt left amb at 1535 with her daughter  [facility]

## 2021-06-04 NOTE — TELEPHONE ENCOUNTER
Mary Kay calls in today stating that they added the MiraLAX yesterday as well as increased her stool softener to 2 tablets 4 times a day and she still has not had a bowel movement.  Her hemorrhoids are really bothering her due to the pressure and they are wondering what they should do.  I sent in a prescription for magnesium citrate.  She is to take half the bottle and if after 1 to 2 hours she still does not have a bowel movement, she should take the other half of the bottle.  Mary Kay reports that her mother has not eaten or drink much of anything today or yesterday.  I said if this does not help, she may need to be brought in to be evaluated.  She is aware that if she calls our main number after hours or on the weekend that it gets them through to a triage nurse who can then get in contact with the on-call oncologist.  She verbalized understanding and stated that she will bring her in tomorrow if she still does not have any relief.    Chinyere Vega RN

## 2021-06-04 NOTE — PATIENT INSTRUCTIONS - HE
Call with any concerns, questions  [facility] Patient Education     Pegfilgrastim Solution for injection  What is this medicine?  PEGFILGRASTIM (peg shannan GRA stim) is a long-acting granulocyte colony-stimulating factor that stimulates the growth of neutrophils, a type of white blood cell important in the body s fight against infection. It is used to reduce the incidence of fever and infection in patients with certain types of cancer who are receiving chemotherapy that affects the bone marrow.  This medicine may be used for other purposes; ask your health care provider or pharmacist if you have questions.  What should I tell my health care provider before I take this medicine?  They need to know if you have any of these conditions:    latex allergy    ongoing radiation therapy    sickle cell disease    skin reactions to acrylic adhesives (On-Body Injector only)    an unusual or allergic reaction to pegfilgrastim, filgrastim, other medicines, foods, dyes, or preservatives    pregnant or trying to get pregnant    breast-feeding  How should I use this medicine?  This medicine is for injection under the skin. If you get this medicine at home, you will be taught how to prepare and give the pre-filled syringe or how to use the On-body Injector. Refer to the patient Instructions for Use for detailed instructions. Use exactly as directed. Take your medicine at regular intervals. Do not take your medicine more often than directed.  It is important that you put your used needles and syringes in a special sharps container. Do not put them in a trash can. If you do not have a sharps container, call your pharmacist or healthcare provider to get one.  Talk to your pediatrician regarding the use of this medicine in children. Special care may be needed.  Overdosage: If you think you have taken too much of this medicine contact a poison control center or emergency room at once.  NOTE: This medicine is only for you. Do not share  this medicine with others.  What if I miss a dose?  It is important not to miss your dose. Call your doctor or health care professional if you miss your dose. If you miss a dose due to an On-body Injector failure or leakage, a new dose should be administered as soon as possible using a single prefilled syringe for manual use.  What may interact with this medicine?  Interactions have not been studied.  Give your health care provider a list of all the medicines, herbs, non-prescription drugs, or dietary supplements you use. Also tell them if you smoke, drink alcohol, or use illegal drugs. Some items may interact with your medicine.  What should I watch for while using this medicine?  You may need blood work done while you are taking this medicine.  If you are going to need a MRI, CT scan, or other procedure, tell your doctor that you are using this medicine (On-Body Injector only).  What side effects may I notice from receiving this medicine?  Side effects that you should report to your doctor or health care professional as soon as possible:    allergic reactions like skin rash, itching or hives, swelling of the face, lips, or tongue    dizziness    fever    pain, redness, or irritation at site where injected    pinpoint red spots on the skin    shortness of breath or breathing problems    stomach or side pain, or pain at the shoulder    swelling    tiredness    trouble passing urine  Side effects that usually do not require medical attention (report to your doctor or health care professional if they continue or are bothersome):    bone pain    muscle pain  This list may not describe all possible side effects. Call your doctor for medical advice about side effects. You may report side effects to FDA at 0-645-FDA-6226.  Where should I keep my medicine?  Keep out of the reach of children.  Store pre-filled syringes in a refrigerator between 2 and 8 degrees C (36 and 46 degrees F). Do not freeze. Keep in carton to  protect from light. Throw away this medicine if it is left out of the refrigerator for more than 48 hours. Throw away any unused medicine after the expiration date.  NOTE: This sheet is a summary. It may not cover all possible information. If you have questions about this medicine, talk to your doctor, pharmacist, or health care provider.  NOTE:This sheet is a summary. It may not cover all possible information. If you have questions about this medicine, talk to your doctor, pharmacist, or health care provider. Copyright  2015 [employer]

## 2021-06-04 NOTE — TELEPHONE ENCOUNTER
Patient's daughter Mary Kay calls in today stating that her mother has 2 hemorrhoids that recently appeared.  She does have a history of these.  She also has constipation and is taking 2 tablets stool softener twice daily.  This does not seem to be working.  She is having a hard time sitting down and Mary Kay reports that she has not had a bowel movement in 2 days.  I called Mary Kay back and let her know that she can increase the stool softeners to 2 tablets up to 4 times daily.  She should also try to add in MiraLAX.  She is using Preparation H for her hemorrhoids which does not seem to help.  I told her to continue the Preparation H and add in using witch hazel pads and she can also take Tylenol or other pain medications if she has them at home.  I did let her know that pain medication can increase constipation and to be aware of this.  I also advised that she take a bath if she can.  Mary Kay states that she will try these and let us know if she needs anything else.    Chinyere Vega RN

## 2021-06-04 NOTE — PROGRESS NOTES
Gabriela Watkins, 79 y.o., female arrived ambulatory to clinic at 1200 for Cycle #2 Day #1 of her chemotherapy regimen. PIV inserted after 3rd attempt into L hand with great blood return and flushed well. Labs reviewed. Administered premedications and chemotherapy per MD order. She tolerated infusion well, no s/s of infusion reaction. At completion of infusion PIV flushed well with normal saline and left in place for tomorrow's appointment. Gabriela Watkins verbalized understanding of plan of care and return to clinic. Discharged to [facility] at 1510 alert and ambulatory with daughter.

## 2021-06-04 NOTE — PROGRESS NOTES
I met with Sandra and her daughter, Mary Kay, in the infusion room today.  I spoke with Mary Kay about [employer] [place] by phone last week and she expressed interest in the program.  We completed the application together today and the form was faxed to [employer].  Sandra and Mary Kay live in a trailer home together and Sandra pays Mary Kay rent.  She has limited monthly income with  and her pension and has needed to set up a payment plan for medical expenses.  I shared some information about the [facility] emergeny jon program and submitted an application on her behalf today.  I will be in touch by phone once the application has been processed and approved.  I encouraged them to look on the [employer] web site for additional resources and I provided a pamphlet as well.  Sandra has noticed her hair has been thinning since she began tx.  She picked up a couple of wigs and plans to have her hair clipped soon.  I offered additional resources for wigs, hats and scarves and Sandra stated she would appreciate that.  I provided a wig resource folder and went over the contents with them.  We discussed emotional resources and Sandra said she does not care for support groups but she would maybe be interested in meeting with Letty Serrano, oncology psychotherapist, in the future.  She will reach out to myself or a staff member if she decides she wants to schedule a visit.  I provided a Senior Linkage Line pamphlet and [employer] leaflet for Mary Kay and Sandra to have on hand should they want to look into local resources near home.  I also talked with Mary Kay about the caregiver resources available online.    Sandra and Mary Kay were appreciative of the information today.  I verified they have my direct number at home and I invited calls for questions or needs.  I will continue to check in with them as well.

## 2021-06-04 NOTE — TELEPHONE ENCOUNTER
I called to see how Sandra is doing since she began systemic tx and her daughter, Mary Kay, answered.  Mary Kay said her Mom had a rough week following her first tx but she was dealing with a cold at the same time.  She also felt quite achy following her neulasta injection.  She tried claritin for the aches but didn't notice much improvement.  She takes Tylenol as needed and she has oxycodone on hand but hasn't needed to take it.  Mary Kay said her Mom spent several days just laying in bed and really did not eat or drink much.  Mary Kay threatened to call an ambulance a couple times as she was worried her Mom would need hospitalization for dehydration.  Nausea was really not a problem for her.  She only needed to take compazine once and it was effective.  She was prescribed abx and cough medication at her last appt and she's doing better now.  She still has a cough but it's much improved.    We discussed needs and Sandra has noticed her hair is thinning.  She found a hat she likes at the clinic and they've ordered two wigs for her.  I offered to provided some additional resources for hats/wigs/scarves and Mary Kay said they would appreciate that.  We discussed meal preparation and Mary Kay said her Mom has always been the cook in their home.  Since Sandra began tx Mary Kay has been doing most of the meal preparation but she works full time.  Mary Kay expressed interest in a meal delivery program and I shared some information about [employer] [place].  I plan to meet with Sandra and Mary Kay next week at Sandra's infusion appt on the 24th to help complete the application.  Mary Kay denies a need for financial resources at this time.  She said she set up a payment plan for her Mom for $50/month.  I will talk with them further about the [facility] jon program when we meet next week.    Mary Kay denies a need for additional resources today.  I invited calls if questions or needs arise prior to her Mom's next appt.

## 2021-06-04 NOTE — PROGRESS NOTES
Pt came into infusion clinic for her Neulasta injection as ordered. Pt tolerated this well. Pt left infusion clinic via ambulatory and will RTC as sched.

## 2021-06-04 NOTE — TELEPHONE ENCOUNTER
I called Mary Kay to notify her that Sandra has been approved for a $500 general jon through the [employer].  I left a detailed message on her cell phone and invited calls if they have questions or need assistance after looking over the bill payment form.  I provided my direct number.

## 2021-06-05 NOTE — PROGRESS NOTES
[facility] Hematology and Oncology Progress Note    Patient: Gabriela Watkins  MRN: [id number]  Date of Service: 02/03/2020        Reason for Visit    Chief Complaint   Patient presents with     HE Cancer     Small cell lung cancer, right lower lobe       Assessment and Plan  Cancer Staging  Primary lung adenocarcinoma, left (H)  Staging form: Lung, AJCC 8th Edition  - Clinical stage from 10/15/2019: Stage IIB (cT3, cN0, cM0) - Signed by Quintin Niño MD on 10/15/2019  PDL1 negative, KRAS mutation    1. Lung cancer, stage IIB, LEFT lung: s/p trisegmentectomy on 8/30/19. Continue to monitor on scans.     2. Lung cancer, small cell, limited stage, RIGHT lung: pt has started chemo. She will get sequential therapy due to age and comorbidities.  Patient has been getting carbo etoposide.  The first 2 cycles were given at a 25% reduction and she had a significant response on her CT scan.  She had a lot of toxicity so she got her third cycle at a 50% reduction.  She is here today for her fourth cycle.  She says she tolerated that cycle much better so she is willing to continue chemo and will get her fourth and final cycle this week at a 50% reduction.  We will then refer to radiation therapy.  She is not sure that she is willing to do the radiation but she will think about it.  Patient will return in about 3 weeks with a CT scan and will see Dr. Niño as well as the radiation doctor at that time    3.  Mild anemia: This is normocytic.  Likely from chronic disease and cancer.  We will continue to monitor especially with chemotherapy.  It is better from 3 weeks ago.    4. Fatigue: likely chemo induced and is cumulative. Encourage good hydration, healthy diet with good protein. Also encourage daily exercise and to be as active as possible.  I encouraged her to rest when she needs to but to also just try to be active          ECOG Performance   ECOG Performance Status: 1     Distress Assessment  Distress  Assessment Score: No distress    Pain  Currently in Pain: No/denies:        Problem List    1. Encounter for antineoplastic chemotherapy  CC OFFICE VISIT LONG    Infusion Appointment    Infusion Appointment    Injection Appointment    DISCONTINUED: sodium chloride 0.9% 250 mL infusion    DISCONTINUED: ondansetron injection 8 mg (ZOFRAN)    DISCONTINUED: dexamethasone injection 10 mg (DECADRON)    DISCONTINUED: etoposide 85 mg in sodium chloride 0.9% 500 mL (TOPOSAR)    DISCONTINUED: CARBOplatin 180 mg in dextrose 5% 250 mL (PARAPLATIN)    DISCONTINUED: sodium chloride flush 20 mL (NS)    DISCONTINUED: heparin lockflush (PF) porcine 300-600 Units    DISCONTINUED: diphenhydrAMINE injection 50 mg (BENADRYL)    DISCONTINUED: famotidine 20 mg/2 mL injection 20 mg (PEPCID)    DISCONTINUED: hydrocortisone sod succ (PF) injection 100 mg    DISCONTINUED: acetaminophen tablet 1,000 mg (TYLENOL)    DISCONTINUED: sodium chloride 0.9% 500 mL   2. Small cell lung cancer, right lower lobe (H)  CC OFFICE VISIT LONG    Ambulatory referral to Radiation Therapy    Infusion Appointment    Infusion Appointment    Injection Appointment    DISCONTINUED: sodium chloride 0.9% 250 mL infusion    DISCONTINUED: ondansetron injection 8 mg (ZOFRAN)    DISCONTINUED: dexamethasone injection 10 mg (DECADRON)    DISCONTINUED: etoposide 85 mg in sodium chloride 0.9% 500 mL (TOPOSAR)    DISCONTINUED: CARBOplatin 180 mg in dextrose 5% 250 mL (PARAPLATIN)    DISCONTINUED: sodium chloride flush 20 mL (NS)    DISCONTINUED: heparin lockflush (PF) porcine 300-600 Units    DISCONTINUED: diphenhydrAMINE injection 50 mg (BENADRYL)    DISCONTINUED: famotidine 20 mg/2 mL injection 20 mg (PEPCID)    DISCONTINUED: hydrocortisone sod succ (PF) injection 100 mg    DISCONTINUED: acetaminophen tablet 1,000 mg (TYLENOL)    DISCONTINUED: sodium chloride 0.9% 500 mL        ______________________________________________________________________________    History of  Present Illness    Measurable disease: CT of the chest, PET scan    Current therapy: Patient has started carboplatin and etoposide, today is cycle 4.  The first 2 cycles were given at 25% reduction.  The third cycle was 50% reduction.    Interim history: Patient is here today to continue on chemo.  She says the third cycle went significantly better with lowered chemo.  She says she is willing to continue chemo because of that reason.  Her biggest side effect is that she was just feeling tired.  She says she just needed to take some naps but other than that she denies any nausea vomiting or changes in her bowel.    Pain Status  Currently in Pain: No/denies    Review of Systems    Oncology Nurse Assessment/CMA Intake: Constitutional  Constitutional (WDL): Exceptions to WDL  Fatigue: Concerns(relieved with rest)  Neurosensory  Neurosensory (WDL): All neurosensory elements are within defined limits  Eye   Eye Disorder (WDL): All eye disorder elements are within defined limits  Ear  Ear Disorder (WDL): All ear disorder elements are within defined limits  Cardiovascular  Cardiovascular (WDL): All cardiovascular elements are within defined limits  Pulmonary  Respiratory (WDL): Within Defined Limits  Gastrointestinal  Gastrointestinal (WDL): Exceptions to WDL  Anorexia: Concerns(intermittently)  Dehydration: Concerns  Dysgeusia: Concerns(improving)  Genitourinary  Genitourinary (WDL): All genitourinary elements are within defined limits  Lymphatic  Lymph (WDL): All lymph disorder elements are within defined limits  Musculoskeletal and Connective Tissue  Musculoskeletal and Connetive Tissue Disorders (WDL): Exceptions to WDL  Arthralgia: Concerns(ankles)  Muscle Weakness : Concerns(hands)  Integumentary  Integumentary (WDL): All integumentary elements are within defined limits  Patient Coping  Patient Coping: Accepting;Open/discussion  Accompanied by  Accompanied by: Family Member  Oral Chemo Adherence         Past  "History  Past Medical History:   Diagnosis Date     Acute gangrenous cholecystitis      Arthritis      Cancer (H)      Cerebral aneurysm, unruptured     11mm     Dyslipidemia      Hypertension      Lung nodules      Osteoarthritis      Primary adenocarcinoma of upper lobe of left lung      Short-term memory loss      Stroke (H)        PHYSICAL EXAM:  /67   Pulse 60   Temp 97.7  F (36.5  C) (Oral)   Ht 4' 11\" (1.499 m)   Wt 139 lb 6.4 oz (63.2 kg)   SpO2 100%   BMI 28.16 kg/m    GENERAL: no acute distress. Cooperative in conversation. Here with daughter  HEENT: pupils are equal, round and reactive. Oromucosa is clean and intact. No ulcerations or mucositis noted. No bleeding noted.  RESP: lungs are clear bilaterally per auscultation. Regular respiratory rate. No wheezes or rhonchi.  CV: Regular, rate and rhythm. No murmurs.  ABD: soft, nontender. Positive bowel sounds. No organomegaly.   MUSCULOSKELETAL: No lower extremity swelling.   NEURO: non focal. Alert and oriented x3.   PSYCH: within normal limits. No depression or anxiety.  SKIN: warm dry intact   LYMPH: no cervical, supraclavicular lymphadenopathy        Lab Results    Recent Results (from the past 168 hour(s))   Magnesium   Result Value Ref Range    Magnesium 1.8 1.8 - 2.6 mg/dL   Comprehensive Metabolic Panel   Result Value Ref Range    Sodium 141 136 - 145 mmol/L    Potassium 3.9 3.5 - 5.0 mmol/L    Chloride 109 (H) 98 - 107 mmol/L    CO2 25 22 - 31 mmol/L    Anion Gap, Calculation 7 5 - 18 mmol/L    Glucose 97 70 - 125 mg/dL    BUN 18 8 - 28 mg/dL    Creatinine 0.92 0.60 - 1.10 mg/dL    GFR MDRD Af Amer >60 >60 mL/min/1.73m2    GFR MDRD Non Af Amer 59 (L) >60 mL/min/1.73m2    Bilirubin, Total 0.4 0.0 - 1.0 mg/dL    Calcium 9.0 8.5 - 10.5 mg/dL    Protein, Total 6.3 6.0 - 8.0 g/dL    Albumin 3.6 3.5 - 5.0 g/dL    Alkaline Phosphatase 100 45 - 120 U/L    AST 13 0 - 40 U/L    ALT <9 0 - 45 U/L   HM1 (CBC with Diff)   Result Value Ref Range    " WBC 6.2 4.0 - 11.0 thou/uL    RBC 3.27 (L) 3.80 - 5.40 mill/uL    Hemoglobin 9.1 (L) 12.0 - 16.0 g/dL    Hematocrit 30.8 (L) 35.0 - 47.0 %    MCV 94 80 - 100 fL    MCH 27.8 27.0 - 34.0 pg    MCHC 29.5 (L) 32.0 - 36.0 g/dL    RDW 25.5 (H) 11.0 - 14.5 %    Platelets 243 140 - 440 thou/uL    MPV 9.3 8.5 - 12.5 fL    Neutrophils % 70 50 - 70 %    Lymphocytes % 16 (L) 20 - 40 %    Monocytes % 12 (H) 2 - 10 %    Eosinophils % 1 0 - 6 %    Basophils % 1 0 - 2 %    Neutrophils Absolute 4.3 2.0 - 7.7 thou/uL    Lymphocytes Absolute 1.0 0.8 - 4.4 thou/uL    Monocytes Absolute 0.8 0.0 - 0.9 thou/uL    Eosinophils Absolute 0.0 0.0 - 0.4 thou/uL    Basophils Absolute 0.0 0.0 - 0.2 thou/uL   Manual Differential   Result Value Ref Range    Platelet Estimate Normal Normal    Ovalocytes 2+ (!) Negative    Polychromasia 1+ (!) Negative    Schistocytes 1+ (!) Negative       Imaging    Ct Chest Abdomen Pelvis With Oral With Iv Cont    Result Date: 1/10/2020  EXAM: CT CHEST ABDOMEN PELVIS W ORAL W IV CONTRAST LOCATION: [facility] DATE/TIME: 1/10/2020 12:01 PM INDICATION: Small right upper lobe cancer- s/p 2 cycles chemo; assess response. COMPARISON: 11/11/2019. TECHNIQUE: CT scan of the chest, abdomen, and pelvis was performed before and after injection of IV contrast. Multiplanar reformats were obtained. Dose reduction techniques were used. CONTRAST: Iohexol (Omni) 75mL. FINDINGS: LUNGS AND PLEURA: Substantial improvement of right upper lobe irregular nodule measuring 3 x 5 mm versus 21 x 25 mm (series 4, image 72). No significant new findings. MEDIASTINUM/AXILLAE: Improved right perihilar adenopathy measuring 3 x 9 mm versus 12 x 19 mm previously (series 4, image 83). No other adenopathy. Atherosclerosis. HEPATOBILIARY: Cholecystectomy. Stable mildly prominent biliary system and hypodensity along the left liver and biliary tree (series 4, image 210). PANCREAS: Normal. SPLEEN: Normal. ADRENAL GLANDS: Normal. KIDNEYS/BLADDER:  Extensive renal hypodensities again noted. No hydronephrosis. BOWEL: Diffuse colonic stool. No obstruction. Colonic diverticulosis. LYMPH NODES: No abdominal or pelvic adenopathy. VASCULATURE: Ectatic abdominal aorta with severe mixed plaque. PELVIC ORGANS: No acute findings. OTHER: None. MUSCULOSKELETAL: No focal bony lesion.     1.  Substantial improvement of right upper lobe lobulated nodule and right perihilar adenopathy. Only a tiny right upper lobe nodule remains and resolution of adenopathy. 2.  No new or enlarging findings. No other evidence for metastatic disease.     Signed by: Sharita Meyers, CNP

## 2021-06-05 NOTE — PROGRESS NOTES
Sandra arrived A&Ox4, confirms she is here for her Neulasta injection as ordered. Pt tolerated inj to R abd this well. Pt left infusion clinic via ambulatory, accompanied by her daughter.

## 2021-06-05 NOTE — PROGRESS NOTES
Pt arrived to infusion clinic. IV from yesterday flushed and blood return noted. When Etoposide was started, IV flushed with resistance. IV removed. New IV access established in the Right forearm with great blood return. Etoposide infusion tolerated well. IV flushed, saline locked and capped, wrapped in coban. Pt ambulated out of clinic independently.

## 2021-06-05 NOTE — PROGRESS NOTES
Pt here for C4 D3. IV from yesterday patent and used for treatment. Upon completion IV removed and pt d/c ambulatory to lobby with her son. Pt aware of treatment plan. Pt will return tomorrow for neulasta injection.

## 2021-06-05 NOTE — PROGRESS NOTES
Pt arrived to infusion clinic. IV started in Left Forearm. Labs reviewed. Pt tolerated infusions well. IV access saline locked, cap applied, wrapped in Coban for treatment tomorrow. Pt ambulated out of infusion clinic independently.

## 2021-06-05 NOTE — PROGRESS NOTES
Pt here for D2 C4. IV placed after 4 attempts R forearm. No problem with infusion as directed. Pt denies new complaint. Upon completion IV flushed wrapped and left in place for tomorrow. Pt d/c ambulatory to lobby with her son.

## 2021-06-05 NOTE — PROGRESS NOTES
[facility] Hematology and Oncology Progress Note    Patient: Gabriela Watikns  MRN: [id number]  Date of Service: 01/13/2020        Reason for Visit    Chief Complaint   Patient presents with     HE Cancer     Small cell lung cancer       Assessment and Plan    T3 N0 M0, stage IIb adenocarcinoma of the left lung status post trisegmentectomy on August 30, 2019  Tumor is PDL 1 negative; K-wilberto mutated, no other alterations noted  Right upper lobe lung nodule, biopsy shows small cell lung cancer diagnosis in October 2019  History of smoking    Patient is completed 2 cycles of chemotherapy with CT scan showing excellent response and almost 90% reduction in the tumor.  Patient having significant side effects with fatigue, decreased performance status, weight loss and constipation and pain related to Neulasta.  Discussed options and she agrees to continue with chemotherapy but we will do dose reduction to 50% of full dose.  We will see her back again in 3 weeks with lab work.  She may consider stopping all treatment completely after 3 cycles or possibly some radiation therapy.    Plan: Proceed with cycle 3 of carboplatin and etoposide with 50% of full dose  Fever precautions  Neulasta support  Follow-up in 3 weeks  Patient is considering either stopping all treatment after this third cycle but may consider radiation therapy      Measurable disease: CT of the chest    Current therapy: Carboplatin and etoposide, today is day 1 cycle 3  Cycle 3 to be administered with 50% of full dose  First 2 cycles administered with 75% of full dose            Cancer Staging  Primary lung adenocarcinoma, left (H)  Staging form: Lung, AJCC 8th Edition  - Clinical stage from 10/15/2019: Stage IIB (cT3, cN0, cM0) - Signed by Quintin Niño MD on 10/15/2019      ECOG Performance   ECOG Performance Status: 1    Distress Assessment  Distress Assessment Score: No distress    Pain         Problem List    1. Small cell lung cancer,  right lower lobe (H)  CC OFFICE VISIT LONG    CC OFFICE VISIT LONG    Infusion Appointment   2. Encounter for antineoplastic chemotherapy  CC OFFICE VISIT LONG    CC OFFICE VISIT LONG    Infusion Appointment   3. Primary lung adenocarcinoma, left (H)          CC: Kirsty Rainey PA-C    ______________________________________________________________________________    History of Present Illness    Ms. Gabriela Watkins returns for reevaluation.  She was seen 3 weeks ago and has now completed cycle 2 of chemotherapy at 75% of full dose.  Overall has had issues with fatigue and decrease in her activity level.  Also anorexia and weight loss.  Also has had pain related to Neulasta.  Also issues with constipation.  ECOG status is 2.  She is debating whether to do anymore treatment.      Currently in Pain: No/denies    Review of Systems    Constitutional  Constitutional (WDL): Exceptions to WDL  Fatigue: Fatigue not relieved by rest - Limiting instrumental ADL  Neurosensory  Neurosensory (WDL): Exceptions to WDL  Ataxia: Asymptomatic, clinical or diagnostic observations only, intervention not indicated(Wobbly gait. )  Cardiovascular  Cardiovascular (WDL): All cardiovascular elements are within defined limits  Pulmonary  Respiratory (WDL): Within Defined Limits  Gastrointestinal  Gastrointestinal (WDL): Exceptions to WDL  Nausea: Loss of appetite without alteration in eating habits  Genitourinary  Genitourinary (WDL): All genitourinary elements are within defined limits  Integumentary  Integumentary (WDL): All integumentary elements are within defined limits  Patient Coping  Patient Coping: Accepting  Distress Assessment  Distress Assessment Score: No distress  Accompanied by  Accompanied by: Family Member    Past History  Past Medical History:   Diagnosis Date     Acute gangrenous cholecystitis      Arthritis      Cancer (H)      Cerebral aneurysm, unruptured     11mm     Dyslipidemia      Hypertension      Lung  nodules      Osteoarthritis      Primary adenocarcinoma of upper lobe of left lung      Short-term memory loss      Stroke (H)          Past Surgical History:   Procedure Laterality Date     BLADDER SURGERY       CATARACT EXTRACTION       CT BIOPSY LUNG  7/8/2019     CT BIOPSY LUNG  10/30/2019     HYSTERECTOMY  age 30     MEDIASTINOSCOPY N/A 8/8/2019    Procedure: MEDIASTINOSCOPY;  Surgeon: Bernard Rosado MD;  Location: [facility];  Service: Cardiovascular     OOPHORECTOMY  age 30     NE LAP,CHOLECYSTECTOMY N/A 6/3/2019    Procedure: CHOLECYSTECTOMY, LAPAROSCOPIC;  Surgeon: Samson Cobb MD;  Location: [facility] Main OR;  Service: General       Physical Exam    Recent Vitals 1/13/2020   Height -   Weight 138 lbs 3 oz   BSA (m2) 1.62 m2   /57   Pulse 56   Temp 98.3   Temp src 1   SpO2 100   Some recent data might be hidden       GENERAL: Alert and oriented to time place and person. Seated comfortably. In no distress.    HEAD: Atraumatic and normocephalic.    EYES: RICKI, EOMI.  No pallor.  No icterus.    Oral cavity: no mucosal lesion or tonsillar enlargement.    NECK: supple. JVP normal.  No thyroid enlargement.    LYMPH NODES: No palpable, cervical, axillary or inguinal lymphadenopathy.    CHEST: clear to auscultation bilaterally.  Resonant to percussion throughout bilaterally.  Symmetrical breath movements bilaterally.    CVS: S1 and S2 are heard. Regular rate and rhythm.  No murmur or gallop or rub heard.  No peripheral edema.    ABDOMEN: Soft. Not tender. Not distended.  No palpable hepatomegaly or splenomegaly.  No other mass palpable.  Bowel sounds heard.    EXTREMITIES: Warm.    SKIN: no rash, or bruising or purpura.  Has a full head of hair.      Lab Results    Recent Results (from the past 168 hour(s))   POCT CREATININE   Result Value Ref Range    iSTAT Creatinine 1.2 (H) 0.6 - 1.1 mg/dL    iSTAT GFR MDRD Af Amer 52 (L) >60 mL/min/1.73m2    iSTAT GFR MDRD Non Af Amer 43 (L) >60  mL/min/1.73m2   HM1 (CBC with Diff)   Result Value Ref Range    WBC 8.3 4.0 - 11.0 thou/uL    RBC 3.10 (L) 3.80 - 5.40 mill/uL    Hemoglobin 8.4 (L) 12.0 - 16.0 g/dL    Hematocrit 27.7 (L) 35.0 - 47.0 %    MCV 89 80 - 100 fL    MCH 27.1 27.0 - 34.0 pg    MCHC 30.3 (L) 32.0 - 36.0 g/dL    RDW 24.6 (H) 11.0 - 14.5 %    Platelets 368 140 - 440 thou/uL    MPV 9.2 8.5 - 12.5 fL       Imaging    Ct Chest Abdomen Pelvis With Oral With Iv Cont    Result Date: 1/10/2020  EXAM: CT CHEST ABDOMEN PELVIS W ORAL W IV CONTRAST LOCATION: [facility] DATE/TIME: 1/10/2020 12:01 PM INDICATION: Small right upper lobe cancer- s/p 2 cycles chemo; assess response. COMPARISON: 11/11/2019. TECHNIQUE: CT scan of the chest, abdomen, and pelvis was performed before and after injection of IV contrast. Multiplanar reformats were obtained. Dose reduction techniques were used. CONTRAST: Iohexol (Omni) 75mL. FINDINGS: LUNGS AND PLEURA: Substantial improvement of right upper lobe irregular nodule measuring 3 x 5 mm versus 21 x 25 mm (series 4, image 72). No significant new findings. MEDIASTINUM/AXILLAE: Improved right perihilar adenopathy measuring 3 x 9 mm versus 12 x 19 mm previously (series 4, image 83). No other adenopathy. Atherosclerosis. HEPATOBILIARY: Cholecystectomy. Stable mildly prominent biliary system and hypodensity along the left liver and biliary tree (series 4, image 210). PANCREAS: Normal. SPLEEN: Normal. ADRENAL GLANDS: Normal. KIDNEYS/BLADDER: Extensive renal hypodensities again noted. No hydronephrosis. BOWEL: Diffuse colonic stool. No obstruction. Colonic diverticulosis. LYMPH NODES: No abdominal or pelvic adenopathy. VASCULATURE: Ectatic abdominal aorta with severe mixed plaque. PELVIC ORGANS: No acute findings. OTHER: None. MUSCULOSKELETAL: No focal bony lesion.     1.  Substantial improvement of right upper lobe lobulated nodule and right perihilar adenopathy. Only a tiny right upper lobe nodule remains and  resolution of adenopathy. 2.  No new or enlarging findings. No other evidence for metastatic disease.         Signed by: Quintin Niño MD

## 2021-06-05 NOTE — PROGRESS NOTES
Gabriela Watkins, 79 y.o., female arrived ambulatory to clinic at 1000 for Cycle #3 Day #3 of her chemotherapy regimen. PIV already present from yesterday's treatment. Positive blood return and flushed well. Administered premedications and chemotherapy per MD order. She tolerated infusion well, no s/s of infusion reaction. At completion of infusion PIV flushed well with normal saline and discontinued. Site covered with gauze and coban wrap. Gabriela Watkins verbalized understanding of plan of care and will return tomorrow for Neulasta injection. Discharged to [facility] at 1145 alert and ambulatory with son.

## 2021-06-05 NOTE — PROGRESS NOTES
Pt arrived ambulatory to clinic for Cycle # 4 Day # 4 of her chemotherapy regimen.  Administered SQ Neulasta into LLQ of ABD per MD order.  Pt tolerated procedure well, no s/s of bleeding or swelling at site.  Pt verbalized understanding of plan of care and return to clinic.

## 2021-06-06 NOTE — PROGRESS NOTES
[facility] Radiation Oncology Follow Up Note    Patient: Gabriela Watkins  MRN: [id number]  Date of Service: 03/09/2020        Ms. Watkins is a 79 y.o. female who is a chronic smoker 50 pack a year for many years.  Patient was admitted to the hospital due to the ongoing cholecystitis in June 2019. She had incidental finding of bilateral lung masses.  CT of the chest shows similar findings.  PET scan shows 4 cm lesion in the left lung and 0.8 cm bilobed lesion in the right lung.  No lymph node involvement.  No evidence of distant metastatic disease.  MRI brain also showed no evidence of metastatic disease.  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  Patient underwent left upper lobe trisegmentectomy by Dr. Oneal, thoracic surgeon at [facility] on 8/30/2019.  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  14 removed lymph nodes showed no evidence of metastatic disease.  Postoperatively she has been recovering well.  The patient received 4 cycles of carbo and etoposide chemotherapy for her small cell lung cancer under the supervision of Dr. Niño, medical oncology. The first 2 cycles were given at a 25% reduction and she had a significant response on her CT scan.  She had a lot of toxicity so she got her third and the fourth cycle at a 50% reduction.  Her last chemotherapy was given 2 weeks ago.  The patient was seen and evaluated earlier on 3/2//2020 for possible definitive radiation therapy.  She was recommended to have PET CT scan for restaging and patient had scan done on 3/4/2020 which showed no evidence of residual disease.. Patient is here today for further evaluation and discussion.     CHEMOTHERAPY HISTORY: Concurrent Chemotherapy: No     RADIATION THERAPY HISTORY: Prior Radiation:  No     IMPLANTED CARDIAC DEVICE: none     Impression      1. Non-small cell lung cancer involving left upper lobe, stage T3 N0 M0, status post left upper lobe trisegmentectomy with negative margin.      2. Limited stage small cell lung cancer involving right upper lobe, clinical stage T1 N0 M0 status post 4 cycles of chemotherapy with good response.      Assessment & Plan:      I have personally reviewed her upcoming medical record today.  I have also reviewed her most recent radiology study including PET CT scan.  The PET CT scan on 3/4/2020 showed complete response with no evidence of local and systemic disease. The possible treatment options for lung cancer including surgery, systemic therapy, and radiation therapy has been discussed with the patient in detail and at a great lengths.  The possible risks and side effects of radiation therapy has also been explained to the patient.  Questions are answered to patient satisfaction.  This is 79-year-old female with diagnosis of bilateral lung cancer.  Her left lung cancer is status post surgery with negative margin.  There is no adjuvant radiation therapy indicated at this point.  Her right limited stage small cell lung cancer received 4 cycles of chemotherapy with a great response.  Patient is not excited about radiation therapy given the bad experience of side effect from her family member.  She is very much worried about the potential side effects from the radiation therapy.  Potential radiation therapy including SBRT in 5 fractions versus standard once a day or twice daily treatment for 5 to 7 weeks has been discussed with the patient.  Patient is interested in the short course of radiation therapy using SBRT which might give her least amount of side effect.  She is also informed that SBRT will not include hilar and mediastinal lymph nodes which might give patient small risk of regional recurrence in the future.  The pros and cons of different options has also  been discussed with the patient.    After long discussion, the patient had proceeded with SBRT as her treatment choice for her lung cancer being aware of potential risks and side effect involved.  She is scheduled to return to radiation oncology later on today for simulation.  I plan to give her radiation therapy to a total dose of 5000 cGy in 5 treatments using SBRT targeted to the right lung cancer.    I spent approximately 25 minutes today with the patient and 80% time was used for counseling.       Stephani Beasley MD, PhD  Department of Radiation Oncology   [facility]  Tel: [phone]  Page: [phone]    [facility]  [address] [place], [place] [address] [place], [place] [address] [facility] [place] [address]    CC:  Patient Care Team:  Kirsty Rainey PA-C as PCP - General (Physician Assistant)  Quintin Niño MD as Physician (Hematology and Oncology)  Aaliyah Jose, RN as Oncology Nurse Navigator (Hematology and Oncology)  Stephani Beasley MD as Physician (Radiation Oncology)

## 2021-06-06 NOTE — TELEPHONE ENCOUNTER
I called Mary Kay to see how her Mom is doing.  Sandra had her final cycle of chemo last week and felt tired Friday and Saturday.  Sunday she felt well enough to play bingo and today she has been doing some food prep at the house.  She's meeting with Dr. Beasley on the 24th but is undecided on wether she'll proceed with radiation.  I encouraged them to keep the appt to learn more about the risks/benfits of tx so she can make the best decision for herself.  Sandra and Mary Kay are receiving deliveries from [employer] every Friday.  Mary Kay has really liked the food and found the service helpful.  Sandra has enjoyed most of the food as well.  They have given Mary Kay's brother some meals, too, so they don't go to waste.  Mary Kay submitted the bill payment form to the [employer] and they requested [employer] cards and [employer] gas cards.  I relayed that Sandra will be eligible for [employer] next week.  I'll submit an application and be in touch with them once it's processed.  Sandra is doing well and Mary Kay has no questions or needs at this time.  I will continue to check in with them and I invited calls as well.

## 2021-06-06 NOTE — TELEPHONE ENCOUNTER
I called Mary Kay and left a message that o[employer]'s fund application was approved.  She can expect a bill payment form in the mail within the next week.  Funds can be used for payment of non medical bills and/or [employer] and [employer] gas gift cards.  I invited calls for questions.

## 2021-06-06 NOTE — PROGRESS NOTES
Pt here with her daughter for f/u with Dr. Beasley, to get PET results, and for likely CT simulation for radiation planning. She's feeling well.

## 2021-06-06 NOTE — PROCEDURES
Procedures  Clinical Treatment Planning Note    The complex radiotherapy planning will be completed for her lung cancer. The patient had a planning CT earlier today for planning. The treatment aids were used for planning, including headrest and SBRT Board to help keep the same position during the daily radiation therapy. The therapy planning is necessary to reduce radiotherapy dose to the normal critical organs which are not possible with simple treatment. In addition, dose to the target and the critical structures requires three-dimensional analysis of the isodose distribution. The planning will be done to reduce dose to the spinal cord, lungs, bronchial tree, esophagus, liver, heart/large vessels, spleen, stomach, small bowel, diaphragm, bone and soft tissue.   I will contour the clinical tumor volume  CTV , with expanded volume of planning treatment volume  PTV  on the treatment planning system. The critical structures will be outlined, including spinal cord, brachial plexus, lungs, bronchial tree, esophagus, liver, heart/large vessels, spleen, stomach, small bowel, diaphragm, bone and soft tissue.   Treatment planning will be done on the computer treatment planning system. The multiple fields will be used to achieve optimal coverage of the target volume. Dose distribution to the above critical structures will be reviewed. Isodose distribution along with the X, Y, Z plan will also be reviewed. Custom blocking will be used to shield normal structures. The beam s eye views will be reviewed and the digital reconstructed image will be reviewed on the planning software. The patient will receive 5000 cGy in 5 treatments targeted to the lung tumors using 6 MV photons with SBRT/IMRT/IGRT.      Stephani Beasley MD, PhD  Department of Radiation Oncology   [facility]  Tel: [phone]  Page: [phone]    [facility]  [address] [place] [address]     [address]  [place]  [address]

## 2021-06-06 NOTE — TELEPHONE ENCOUNTER
Called to get pt scheduled for Simulation on Monday, 3/2/2020 at 1:00 pm.  lft to c/b [phone] to schedule.

## 2021-06-06 NOTE — PROCEDURES
Procedures  SBRT Special Treatment Procedure Note  Date of Service: 3/9/2020    Diagnosis:      1. Non-small cell lung cancer involving left upper lobe, stage T3 N0 M0, status post left upper lobe trisegmentectomy with negative margin.     2. Limited stage small cell lung cancer involving right upper lobe, clinical stage T1 N0 M0 status post 4 cycles of chemotherapy with good response.     Medical Indication:  To escalate the radiotherapy dose to a curative dose (5000 cGy) using stereotactic body radiotherapy technique and to spare surrounding lung and normal tissues from excessive toxicity.  SBRT planning was completed today to prepare for lung cancer treatment.  SBRT planning is chosen to avoid the critical structures, which cannot be done adequately with conventional planning due to the dose constraints of the normal surrounding critical organs.  In addition, the treatment will be high dose per fraction with complete course to be done in 5 sessions. The patient previously had CT scan acquisition for planning and special treatment aids used include.  The patient was simulated in a supine position. After the contouring of the GTV, ITV (MIPS 90%) and MIPS Averages, a clinical tumor volume (CTV), expanded volume of planned treatment volume (PTV) was carried out on the treatment planning system.  Critical structures outlined included both right and left lung, brachial plexus, spinal cord, esophagus, and airway. Extra efforts were required to immobilize the patient using the custom designed stereotactic frame as well as planning.  Extra efforts during the planning process included contouring of critical structures, GTV, ITV, CTV, PTV and evaluating the DVH and coverage of the PTV.    The patient is going to have SBRT technique for her lung cancer. I have explained to the patient this is a very complicated process which will require an extensive amount of time for planning, delivering and monitoring the patient. The  patient understands well and wished to proceed.     Stephani Beasley MD, PhD  Department of Radiation Oncology   [facility]  Tel: [phone]  Page: [phone]    [facility]  [address] [place] [address]     [address]   [place] [address]

## 2021-06-06 NOTE — PROGRESS NOTES
Pt and her daughter are back to discuss RT with Dr. Beasley. Pt states she's leaning towards SBRT instead of standard fractionation. Feeling well, mild fatigue.

## 2021-06-06 NOTE — PROCEDURES
Procedures  SIMULATION NOTE:   DIAGNOSIS:  1. Non-small cell lung cancer involving left upper lobe, stage T3 N0 M0, status post left upper lobe trisegmentectomy with negative margin.   2. Limited stage small cell lung cancer involving right upper lobe, clinical stage T1 N0 M0 status post 4 cycles of chemotherapy with good response.      INDICATION: After discussion with patient about various treatment options, the patient elected to proceed with definitive radiation therapy using SBRT technique for her right lung cancer. The patient is here for simulation.     CONSENT: The possible risks and side effects of radiation therapy have been discussed with the patient in detail and at a great length. The patient's questions are answered to patient's satisfaction. Written consent was obtained.     SIMULATION: Patient is in supine position with SBRT frame and VacLok to help keep the same position during the radiation therapy. Tentative isocenter is set in the center of thoracic region. We will acquire 4D scan to help us better locate target and design radiation therapy field. We are also going to use the respiratory gating technique to help us to better locate the movement of the tumor.     BLOCKS: Custom blocks will be drawn to minimize radiation to normal tissues and to protect normal organs including, but not limited to, spinal cord, brachial plexus, lungs, bronchial tree, esophagus, liver, heart/large vessels, spleen, stomach, small bowel, diaphragm, bone and soft tissue.     DOSAGE: I plan to give her radiation therapy at 1000 cGy each fraction to a total of 5000 cGy in 5 fractions over 2 weeks. I will consider using SBRT/IGRT technique to help us to better locate the target and to protect normal tissue.     Stephani Beasley MD, PhD  Department of Radiation Oncology   [facility]  Tel: [phone]  Page: [phone]    [facility]  [address] [place] [address]     [address] [place] [address]

## 2021-06-06 NOTE — PROGRESS NOTES
[facility] Radiation Oncology Follow Up Note    Patient: Gabriela Watkins  MRN: [id number]  Date of Service: 03/02/2020      Ms. Watkins is a 79 y.o. female who is a chronic smoker 50 pack a year for many years.  Patient was admitted to the hospital due to the ongoing cholecystitis in June 2019. She had incidental finding of bilateral lung masses.  CT of the chest shows similar findings.  PET scan shows 4 cm lesion in the left lung and 0.8 cm bilobed lesion in the right lung.  No lymph node involvement.  No evidence of distant metastatic disease.  MRI brain also showed no evidence of metastatic disease.  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  Patient underwent left upper lobe trisegmentectomy by Dr. Oneal, thoracic surgeon at [facility] on 8/30/2019.  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  14 removed lymph nodes showed no evidence of metastatic disease.  Postoperatively she has been recovering well.  The patient received 4 cycles of carbo and etoposide chemotherapy for her small cell lung cancer under the supervision of Dr. Niño, medical oncology. The first 2 cycles were given at a 25% reduction and she had a significant response on her CT scan.  She had a lot of toxicity so she got her third and the fourth cycle at a 50% reduction.  Her last chemotherapy was given 2 weeks ago.  The patient was seen and evaluated earlier on 2/24/2020 for possible definitive radiation therapy.  Patient is here today for further evaluation and discussion.    CHEMOTHERAPY HISTORY: Concurrent Chemotherapy: No     RADIATION THERAPY HISTORY: Prior Radiation: No     IMPLANTED CARDIAC DEVICE: none      Impression      1. Non-small cell lung cancer involving left upper lobe, stage T3 N0 M0, status post  left upper lobe trisegmentectomy with negative margin.      2. Limited stage small cell lung cancer involving right upper lobe, clinical stage T1 N0 M0 status post 4 cycles of chemotherapy with good response.     Assessment & Plan:      I have personally reviewed her upcoming medical record today.  I have also reviewed her most recent radiology study including PET CT scan.  The possible treatment options for lung cancer including surgery, systemic therapy, and radiation therapy has been discussed with the patient in detail and at a great lengths.  The possible risks and side effects of radiation therapy has also been explained to the patient.  Questions are answered to patient satisfaction.  This is 79-year-old female with diagnosis of bilateral lung cancer.  Her left lung cancer is status post surgery with negative margin.  There is no adjuvant radiation therapy indicated at this point.  Her right limited stage small cell lung cancer received 4 cycles of chemotherapy with a great response.  Patient is not excited about radiation therapy given the bad experience of side effect from her family member.  She is very much worried about the potential side effects from the radiation therapy.  Potential radiation therapy including SBRT in 5 fractions versus standard once a day or twice daily treatment for 5 to 7 weeks has been discussed with the patient.  Patient is interested in the short course of radiation therapy using SBRT which might give her least amount of side effect.  She is also informed that SBRT will not include hilar and mediastinal lymph nodes which might give patient small risk of regional recurrence in the future.  The pros and cons of different options has also been discussed with the patient.  After seeing Dr. Eagle last week, the patient is still considering possible SBRT for her lung cancer.  This is more convenient for the patient and her family.  We therefore will schedule patient to have  restaging PET CT scan.  If the scan showed no evidence of fran and systemic metastasis, we will proceed with SBRT.    She is therefore scheduled to return to radiation oncology in 1 week post PET CT scan for another office follow-up and discussion of her treatment decision.     I spent approximately 25 minutes today with the patient and 80% time was used for counseling.        Stephani Beasley MD, PhD  Department of Radiation Oncology   [facility]  Tel: [phone]  Page: [phone]    [facility]  [address] [address]    CC:  Patient Care Team:  Kirsty Rainey PA-C as PCP - General (Physician Assistant)  Quintin Niño MD as Physician (Hematology and Oncology)  Aaliyah Jose, RN as Oncology Nurse Navigator (Hematology and Oncology)  Stephani Beasley MD as Physician (Radiation Oncology)

## 2021-06-06 NOTE — PROGRESS NOTES
Sandra and her daughter, Mary Kay, met with Dr. Beasley today and Sandra is undecided about radiation tx.  She plans to consider her options and would like to talk it over with Dr. Niño at her f/u appt this Friday.  Dr. Beasley will see her back on Monday for a f/u and possible sim.    I told Sandar and Mary Kay I submitted an application for [facility] through the [employer] last week and I'm expecting it will be processed this week.  I will call once the application has been approved.  They have no further questions or needs today.

## 2021-06-06 NOTE — PROGRESS NOTES
Pt here ambulatory with her daughter for consult with Dr. Beasley. Feeling fairly good since chemo ended. I gave her a new patient RT folder and we discussed the routine for SBRT including consult, simulation CT, likely 5 treatments, and common s/e of fatigue and possible cough or shortness of breath. She met with Dr. Beasley, will meet with Dr. Niño in f/u in four days time and then possibly come back to see us and have simulation in one week.

## 2021-06-06 NOTE — CONSULTS
Consults   [facility] Radiation Oncology Consult Note     Patient: Gabriela Watkins  MRN: [id number]  Date of Service: 02/24/2020          Quintin Niño MD  [address] [place] [address]       Dear Dr. Niño:    Thank you very much for referring this patient for consideration of radiotherapy. As you know Ms. Watkins is a 79 y.o. female with a diagnosis of non-small cell lung cancer involving left upper lobe, stage T3 N0 M0, status post left upper lobe trisegmentectomy with negative margin and a diagnosis of limited stage small cell lung cancer involving right upper lobe, clinical stage T1 N0 M0 status post 4 cycles of chemotherapy with good response.  Patient is referred to radiation oncology for evaluation and consideration of possible definitive radiation therapy for her right lung cancer.    HISTORY OF PRESENT ILLNESS:   Ms. Watkins is a 79 y.o. female who is a chronic smoker 50 pack a year for many years.  Patient was admitted to the hospital due to the ongoing cholecystitis in June 2019. She had incidental finding of bilateral lung masses.  CT of the chest shows similar findings.  PET scan shows 4 cm lesion in the left lung and 0.8 cm bilobed lesion in the right lung.  No lymph node involvement.  No evidence of distant metastatic disease.  MRI brain also showed no evidence of metastatic disease.  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  Patient underwent left upper lobe trisegmentectomy by Dr. Oneal, thoracic surgeon at [facility] on 8/30/2019.  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  14 removed lymph nodes showed no evidence of metastatic disease.  Postoperatively she has been recovering well.  The patient received 4 cycles of carbo and etoposide  chemotherapy for her small cell lung cancer under the supervision of Dr. Niño, medical oncology. The first 2 cycles were given at a 25% reduction and she had a significant response on her CT scan.  She had a lot of toxicity so she got her third and the fourth cycle at a 50% reduction.  Her last chemotherapy was given 2 weeks ago.  Patient is referred to radiation oncology for evaluation and consideration of possible definitive radiation therapy for her limited stage small cell lung cancer.    CHEMOTHERAPY HISTORY: Concurrent Chemotherapy: No    RADIATION THERAPY HISTORY: Prior Radiation: No    IMPLANTED CARDIAC DEVICE: none     PREGNANCY: The patient is informed not to be pregnant during the radiation therapy.  She is post menopausal.    Current Outpatient Medications   Medication Sig Dispense Refill     acetaminophen (TYLENOL) 500 MG tablet Take 1,000 mg by mouth every 6 (six) hours as needed for pain.       ALPRAZolam (XANAX) 0.25 MG tablet Take 1 dose prior to procedure 5 tablet 0     amLODIPine (NORVASC) 10 MG tablet Take 10 mg by mouth daily.       atorvastatin (LIPITOR) 40 MG tablet Take 1 tablet (40 mg total) by mouth at bedtime. 30 tablet 0     clopidogrel (PLAVIX) 75 mg tablet Take 1 tablet (75 mg total) by mouth daily. 30 tablet 0     codeine-guaiFENesin (GUAIFENESIN AC)  mg/5 mL liquid Take 5 mL by mouth 3 (three) times a day as needed for cough. 240 mL 0     fluticasone propionate (FLONASE) 50 mcg/actuation nasal spray 1 spray into each nostril daily.  2     furosemide (LASIX) 20 MG tablet Take 20 mg by mouth daily.  3     loratadine (CLARITIN) 10 mg tablet Take 10 mg by mouth daily. Take for 5-7 days with Neulasta injection       magnesium citrate solution Take half bottle.  If after 1-2 hours, no relief, take other half of bottle 296 mL 1     metoprolol tartrate (LOPRESSOR) 50 MG tablet TAKE 1.5 TABLETS (75 MG TOTAL) BY MOUTH 2 (TWO) TIMES A DAY. 270 tablet 1     miscellaneous medical supply  Misc Use 1 spray As Directed as needed ([employer] topical analgesic).       potassium chloride (KLOR-CON) 10 MEQ CR tablet Take 1 tab daily       prochlorperazine (COMPAZINE) 10 MG tablet Take 1 tablet (10 mg total) by mouth every 6 (six) hours as needed (For breakthrough nausea/vomiting). 30 tablet 1     No current facility-administered medications for this visit.      Past Medical History:   Diagnosis Date     Acute gangrenous cholecystitis      Arthritis      Cancer (H)      Cerebral aneurysm, unruptured     11mm     Dyslipidemia      Hypertension      Lung nodules      Osteoarthritis      Primary adenocarcinoma of upper lobe of left lung      Short-term memory loss      Stroke (H)      Past Surgical History:   Procedure Laterality Date     BLADDER SURGERY       CATARACT EXTRACTION       CT BIOPSY LUNG  7/8/2019     CT BIOPSY LUNG  10/30/2019     HYSTERECTOMY  age 30     MEDIASTINOSCOPY N/A 8/8/2019    Procedure: MEDIASTINOSCOPY;  Surgeon: Bernard Rosado MD;  Location: [facility] OR;  Service: Cardiovascular     OOPHORECTOMY  age 30     AL LAP,CHOLECYSTECTOMY N/A 6/3/2019    Procedure: CHOLECYSTECTOMY, LAPAROSCOPIC;  Surgeon: Samson Cobb MD;  Location: [facility] OR;  Service: General     Patient has no known allergies.  Family History   Problem Relation Age of Onset     Colon cancer Mother      Breast cancer Sister         50s     Cancer Maternal Aunt         type unknown     Social History     Socioeconomic History     Marital status:      Spouse name: Not on file     Number of children: Not on file     Years of education: Not on file     Highest education level: Not on file   Occupational History     Not on file   Social Needs     Financial resource strain: Not on file     Food insecurity:     Worry: Not on file     Inability: Not on file     Transportation needs:     Medical: Not on file     Non-medical: Not on file   Tobacco Use     Smoking status: Former Smoker     Last attempt  to quit: 2007     Years since quittin.7     Smokeless tobacco: Never Used   Substance and Sexual Activity     Alcohol use: Yes     Comment: occassional     Drug use: Never     Sexual activity: Never   Lifestyle     Physical activity:     Days per week: Not on file     Minutes per session: Not on file     Stress: Not on file   Relationships     Social connections:     Talks on phone: Not on file     Gets together: Not on file     Attends Orthodoxy service: Not on file     Active member of club or organization: Not on file     Attends meetings of clubs or organizations: Not on file     Relationship status: Not on file     Intimate partner violence:     Fear of current or ex partner: Not on file     Emotionally abused: Not on file     Physically abused: Not on file     Forced sexual activity: Not on file   Other Topics Concern     Not on file   Social History Narrative     Not on file        Review of Systems:      General  Constitutional (WDL): Exceptions to WDL  Fatigue: Fatigue relieved by rest  EENT  Eye Disorder (WDL): All eye disorder elements are within defined limits  Ear Disorder (WDL): All ear disorder elements are within defined limits  Respiratory   Respiratory (WDL): Exceptions to WDL  Cough: Mild symptoms, nonprescription intervention indicated(mild, rare)  Cardiovascular  Cardiovascular (WDL): All cardiovascular elements are within defined limits  Endocrine     Gastrointestinal  Gastrointestinal (WDL): All gastrointestinal elements are within defined limits  Musculoskeletal  Musculoskeletal and Connetive Tissue Disorders (WDL): All Musculoskeletal and Connetive Tissue Disorder elements are within defined limits  Integumentary               Integumentary (WDL): Exceptions to WDL  Alopecia: Hair loss of >50% normal for that individual that is readily apparent to others, a wig or hair piece is necessary if the patient desires to completely camouflage the hair loss, associated with psychosocial  impact  Neurological  Neurosensory (WDL): All neurosensory elements are within defined limits  Psychological/Emotional   Patient Coping: Accepting;Open/discussion  Hematological/Lymphatic  Lymph (WDL): All lymph disorder elements are within defined limits  Dermatologic     Genitourinary/Reproductive  Genitourinary (WDL): All genitourinary elements are within defined limits  Reproductive     Pain              Currently in Pain: No/denies  Accompanied by  Accompanied by: Family Member    Imaging: Reviewed    Pathology: Reviewed    ECOG Peformance Status  ECOG Performance Status: 1  Distress Assessment Score: 3(deciding about tx)    Objective:      PHYSICAL EXAMINATION:    /56   Pulse 60   Temp 97.7  F (36.5  C) (Oral)   Wt 140 lb 3.2 oz (63.6 kg)   SpO2 98%   BMI 28.32 kg/m      Gen: Alert, in NAD  Eyes: PERRL, EOMI, sclera anicteric  HENT     Head: NC/AT     Ears: No external auricular lesions     Nose/sinus: No rhinorrhea or epistaxis     Oropharynx: MMM, no visible oral lesions  Neck: Supple, full ROM, no LAD  Pulm: No wheezing, stridor or respiratory distress  CV: Well-perfused, no cyanosis, no pedal edema  Abdominal: BS+, soft, nontender, nondistended, no hepatomegaly  Back: No step-offs or pain to palpation along the thoracolumbar spine  Rectal: Deferred  : Deferred  Musculoskeletal: Normal muscle bulk and tone  Skin: Normal color and turgor  Neurologic: A/Ox3, CN II-XII intact, normal gait and station  Psychiatric: Appropriate mood and affect     Impression     1. Non-small cell lung cancer involving left upper lobe, stage T3 N0 M0, status post left upper lobe trisegmentectomy with negative margin.     2. Limited stage small cell lung cancer involving right upper lobe, clinical stage T1 N0 M0 status post 4 cycles of chemotherapy with good response.     Assessment & Plan:     I have personally reviewed her upcoming medical record today.  I have also reviewed her most recent radiology study including  PET CT scan.  The possible treatment options for lung cancer including surgery, systemic therapy, and radiation therapy has been discussed with the patient in detail and at a great lengths.  The possible risks and side effects of radiation therapy has also been explained to the patient.  Questions are answered to patient satisfaction.  This is 79-year-old female with diagnosis of bilateral lung cancer.  Her left lung cancer is status post surgery with negative margin.  There is no adjuvant radiation therapy indicated at this point.  Her right limited stage small cell lung cancer received 4 cycles of chemotherapy with a great response.  Patient is not excited about radiation therapy given the bad experience of side effect from her family member.  She is very much worried about the potential side effects from the radiation therapy.  Potential radiation therapy including SBRT in 5 fractions versus standard once a day or twice daily treatment for 5 to 7 weeks has been discussed with the patient.  Patient is interested in the short course of radiation therapy using SBRT which might give her least amount of side effect.  She is also informed that SBRT will not include hilar and mediastinal lymph nodes which might give patient small risk of regional recurrence in the future.  The pros and cons of different options has also been discussed with the patient.  She is going to think it over and discuss her options with Dr. Niño, medical oncology later on this week.  She is therefore scheduled to return to radiation oncology in 1 week for another office follow-up and discussion of her treatment decision.    Again, thank you very much for the referral and allowing me to participate in the care of this patient.  If you have any questions or concerns about this consultation, please do not hesitate to call.  I spent approximately 45 minutes today with the patient and 80% time was used for  counseling.      Sincerely,          Stephani Beasley MD, PhD  Department of Radiation Oncology   [facility]  Tel: [phone]  Page: [phone]    [facility]  [address] [place] [address]     [address] [place] [address]    CC:  Patient Care Team:  Kirsty Rainey PA-C as PCP - General (Physician Assistant)  Quintin Niño MD as Physician (Hematology and Oncology)  Aaliyah Jose RN as Oncology Nurse Navigator (Hematology and Oncology)  Stephani Beasley MD as Physician (Radiation Oncology)

## 2021-06-06 NOTE — PROGRESS NOTES
[facility] Hematology and Oncology Progress Note    Patient: Gabriela Watkins  MRN: [id number]  Date of Service: 02/28/2020        Reason for Visit    Chief Complaint   Patient presents with     HE Cancer     Small cell lung cancer, right lower lobe       Assessment and Plan    T3 N0 M0, stage IIb adenocarcinoma of the left lung status post trisegmentectomy on August 30, 2019  Tumor is PDL 1 negative; K-wilberto mutated, no other alterations noted  Right upper lobe lung nodule, biopsy shows small cell lung cancer diagnosis in October 2019  History of smoking    Patient completed cycle 4 of chemotherapy beginning February 3.  CT scan shows resolution of lymphadenopathy and further reduction in size of the right upper lobe lung mass.    Patient has met with Dr. Beasley, and has discussed options for radiation therapy.  Will defer decision about stereotactic versus standard radiation to Dr. Beasley.    Anticipate that the patient will have restaging scans in 3 to 4 months from now and I will defer scheduling this to Dr. Beasley.  I will see patient back in 4 months.    Discussed that our plan from here on out would be monitoring with CT scans about every 3 months.    Plan: Follow-up in 4 months  Defer scheduling of scan after radiation therapy to Dr. Foley    Measurable disease: CT of the chest    Current therapy:To start radiation therapy      Treatment history:  Carbo etoposide for 4 cycles last starting February 3, 2020   carboplatin and etoposide, today is day 1 cycle 3  Cycle 3 to be administered with 50% of full dose  First 2 cycles administered with 75% of full dose            Cancer Staging  Primary lung adenocarcinoma, left (H)  Staging form: Lung, AJCC 8th Edition  - Clinical stage from 10/15/2019: Stage IIB (cT3, cN0, cM0) - Signed by Quintin Niño MD on 10/15/2019      ECOG Performance   ECOG Performance Status: 1    Distress Assessment  Distress Assessment Score: No distress    Pain         Problem  List    1. Small cell lung cancer, right lower lobe (H)     2. Primary lung adenocarcinoma, left (H)          CC: Kirsty Rainey PA-C    ______________________________________________________________________________    History of Present Illness    Ms. Gabriela Watkins returns for reevaluation.  She completed cycle 4 of chemotherapy just over 3 weeks ago.  Has tolerated much better with the dose reduction.  Appetite and weight are stable.  No new issues.  ECOG status is 1.    Currently in Pain: No/denies    Review of Systems    Constitutional  Constitutional (WDL): Exceptions to WDL  Fatigue: Fatigue not relieved by rest - Limiting instrumental ADL  Neurosensory  Neurosensory (WDL): All neurosensory elements are within defined limits  Cardiovascular  Cardiovascular (WDL): Exceptions to WDL  Edema: Yes(Feet. Occassional.)  Pulmonary  Respiratory (WDL): Within Defined Limits  Gastrointestinal  Gastrointestinal (WDL): All gastrointestinal elements are within defined limits  Genitourinary  Genitourinary (WDL): All genitourinary elements are within defined limits  Integumentary  Integumentary (WDL): Exceptions to WDL  Alopecia: Hair loss of >50% normal for that individual that is readily apparent to others, a wig or hair piece is necessary if the patient desires to completely camouflage the hair loss, associated with psychosocial impact  Patient Coping  Patient Coping: Accepting  Distress Assessment  Distress Assessment Score: No distress  Accompanied by  Accompanied by: Family Member    Past History  Past Medical History:   Diagnosis Date     Acute gangrenous cholecystitis      Arthritis      Cancer (H)      Cerebral aneurysm, unruptured     11mm     Dyslipidemia      Hypertension      Lung nodules      Osteoarthritis      Primary adenocarcinoma of upper lobe of left lung      Short-term memory loss      Stroke (H)          Past Surgical History:   Procedure Laterality Date     BLADDER SURGERY       CATARACT  EXTRACTION       CT BIOPSY LUNG  7/8/2019     CT BIOPSY LUNG  10/30/2019     HYSTERECTOMY  age 30     MEDIASTINOSCOPY N/A 8/8/2019    Procedure: MEDIASTINOSCOPY;  Surgeon: Bernard Rosado MD;  Location: [facility];  Service: Cardiovascular     OOPHORECTOMY  age 30     [place] LAP,CHOLECYSTECTOMY N/A 6/3/2019    Procedure: CHOLECYSTECTOMY, LAPAROSCOPIC;  Surgeon: Samson Cobb MD;  Location: [facility];  Service: General       Physical Exam    Recent Vitals 2/28/2020   Height -   Weight 142 lbs 14 oz   BSA (m2) 1.64 m2   /52   Pulse 58   Temp 97.6   Temp src 1   SpO2 100   Some recent data might be hidden       GENERAL: Alert and oriented to time place and person. Seated comfortably. In no distress.    HEAD: Atraumatic and normocephalic.    EYES: RICKI, EOMI.  No pallor.  No icterus.    Oral cavity: no mucosal lesion or tonsillar enlargement.    NECK: supple. JVP normal.  No thyroid enlargement.    LYMPH NODES: No palpable, cervical, axillary or inguinal lymphadenopathy.    CHEST: clear to auscultation bilaterally.  Resonant to percussion throughout bilaterally.  Symmetrical breath movements bilaterally.    CVS: S1 and S2 are heard. Regular rate and rhythm.  No murmur or gallop or rub heard.  No peripheral edema.    ABDOMEN: Soft. Not tender. Not distended.  No palpable hepatomegaly or splenomegaly.  No other mass palpable.  Bowel sounds heard.    EXTREMITIES: Warm.    SKIN: no rash, or bruising or purpura.  Has a full head of hair.      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 TECHNIQUE: CT chest with IV contrast. Multiplanar reformats were obtained. Dose reduction techniques were used. CONTRAST: Iohexol (Omni) 75 mL. FINDINGS: LUNGS AND PLEURA: There is a fiducial marker in the right upper lobe cancer, and there is a tiny density remaining (image 31, series 2), that measures 3 x 4 mm in diameter, it was previously measured at 3 x 5 mm. Again seen are posttreatment changes in the left upper lung field with volume loss and with a small paramediastinal density as seen on image 31 that is stable. There is a 7 x 6 mm density in the right posterior costophrenic angle on image 91 that measured 6.4 mm in maximum diameter on 11/11/2019. This needs further follow-up. MEDIASTINUM/AXILLAE: There was right hilar adenopathy on the CT of 11/11/2009. This was normal in size at 3 x 9 mm on the CT of 1/10/2020. No mediastinal adenopathy is seen on today's study. There are normal-sized lymph nodes visible. A precarinal node on image 40 measures 5 mm in short axis diameter which is unchanged and normal. UPPER ABDOMEN: Patient has had a cholecystectomy. There are prominent atherosclerotic changes in the aorta. There are multiple bilateral renal cysts MUSCULOSKELETAL: There are degenerative changes in the spine.     1.  There is a fiducial marker at the patient's previously diagnosed small cell neuroendocrine carcinoma in the right upper lobe. There is a small 4 mm density remaining adjacent to the fiducial marker, this measured 5 mm on the prior study. Right hilar adenopathy seen on the study of 11/11/2019 has resolved. 2.  There is a 7 x 6 mm density in the right posterior costophrenic angle that measured 6.4 mm and 11/11/2019. This needs follow-up        Signed by: Quintin Niño MD

## 2021-06-07 NOTE — PROGRESS NOTES
SBRT/SRS Treatment Summary    Patient: Gabriela Watkins   MRN: [id number]  : 1940  Care Provider: Stephani Beasley    Date of Service: 2020        Quintin Niño MD  [address] [place] [address]                   Dear Dr. Niño:     Your patient Mrs. Gabriela Watkins completed her radiation therapy 2020. As you know Ms. Watkins is a 79 y.o. female with a diagnosis of non-small cell lung cancer involving left upper lobe, stage T3 N0 M0, status post left upper lobe trisegmentectomy with negative margin and a diagnosis of limited stage small cell lung cancer involving right upper lobe, clinical stage T1 N0 M0 status post 4 cycles of chemotherapy with good response.  She received definitive radiation therapy to her right lung cancer with a total dose of 5000 cGy in 5 treatments given from 3/23/2020-2020.  She tolerated radiation therapy very well with minimal side effect.  She is scheduled to return to radiation oncology in 2 months for routine postradiation therapy office follow-up and restaging CT scan.    Again, thank you very much for the referral and allowing me to explain the care of this patient.  If you have any question about the patient, please do not hesitate to call.    Sincerely,      Stephani Beasley MD, PhD  Department of Radiation Oncology   [facility]  Tel: [phone]  Page: [phone]    [facility]  [address] [place] [address]     [address]  [place] [address]    CC:  Patient Care Team:  Kirsty Rainey PA-C as PCP - General (Physician Assistant)  Quintin Niño MD as Physician (Hematology and Oncology)  Aaliyah Jose, RN as Oncology Nurse Navigator (Hematology and Oncology)  Stephani Beasley MD as Physician (Radiation Oncology)

## 2021-06-07 NOTE — PROGRESS NOTES
Pt ambulatory to radiation clinic for last RT. D/C instructions given. Informed to call with any questions or concerns. Follow up in 3 months with a scan prior.

## 2021-06-07 NOTE — PROGRESS NOTES
RADIATION ONCOLOGY WEEKLY TREATMENT VISIT NOTE      Assessment / Impression       1. Small cell lung cancer, right lower lobe (H)       Tolerating radiation therapy well.  All questions and concerns addressed.    Plan:     Continue radiation treatment as prescribed.    Subjective:      HPI: Gabriela Watkins is a 79 y.o. female with    1. Small cell lung cancer, right lower lobe (H)         The following portions of the patient's history were reviewed and updated as appropriate: allergies, current medications, past family history, past medical history, past social history, past surgical history and problem list.    Assessment                  Body Site: Thoracic Site: Lung  Stereotactic Radiosurgery: Yes  Stereotactic Radiosurgery date: 20  Today's Dose: 1000  Total Dose for Thoracic: 5000  Today's Fraction/Total Fraction Thoracic:   Voice Chances/Stridor/Larynx: 0: Normal  Pharynx and Esphogaus: 1: Tolerates regular diet  Constipation: 0: None  Diarrhea W/O Colostomy: 0: None  Cough: 0: Absent  Hemoptysis: 0: None  Dyspnea: 2: Dyspnea on exertion                                              Sexuality Alteration                 Emotional Alteration Copin: Effective  Comfort Alteration KPS: 90% Can perform normal activity, minor signs of disease  Fatigue (ONS scale) : 1: Mild Fatigue  Pain Location: denies   Nutrition Alteration Anorexia: 0: None  Nausea: 0: None  Vomitin: None  Dyspepsia and/or Heartburn: 0: None  Pharynx and Esphogaus: 1: Tolerates regular diet  Skin Alteration Skin Sensation: 0: No problem  Skin Reaction: 0: None  AUA Assessment                                  Accompanied by       Objective:     Exam: Examination reviewed no significant changes.    Vitals:    20 1455   Weight: 144 lb 4.8 oz (65.5 kg)       Wt Readings from Last 8 Encounters:   20 144 lb 4.8 oz (65.5 kg)   20 142 lb 14.4 oz (64.8 kg)   20 140 lb 3.2 oz (63.6 kg)   20 139 lb  6.4 oz (63.2 kg)   01/13/20 138 lb 3.2 oz (62.7 kg)   12/23/19 141 lb 12.8 oz (64.3 kg)   12/11/19 141 lb 11.2 oz (64.3 kg)   12/05/19 150 lb (68 kg)       General: Alert and oriented, in no acute distress  Gabriela has no Erythema.  Aria chart and setup information reviewed    Stephani Beasley MD

## 2021-06-07 NOTE — TELEPHONE ENCOUNTER
Called and spoke to Mary Kay at this time, Sandra was resting, and she said Sandra is doing fine just a bit tired. I reassured her that fatigue is a normal s/e of radiation and should improve with time. No other concerns.

## 2021-06-08 NOTE — PROGRESS NOTES
"Gabriela Watkins is a 79 y.o. female who is being evaluated via a billable telephone visit.      The patient has been notified of following:     \"This telephone visit will be conducted via a call between you and your physician/provider. We have found that certain health care needs can be provided without the need for a physical exam.  This service lets us provide the care you need with a short phone conversation.  If a prescription is necessary we can send it directly to your pharmacy.  If lab work is needed we can place an order for that and you can then stop by our lab to have the test done at a later time.    Telephone visits are billed at different rates depending on your insurance coverage. During this emergency period, for some insurers they may be billed the same as an in-person visit.  Please reach out to your insurance provider with any questions.    If during the course of the call the physician/provider feels a telephone visit is not appropriate, you will not be charged for this service.\"    Patient has given verbal consent to a Telephone visit? Yes    What phone number would you like to be contacted at? [phone]    Patient would like to receive their AVS by AVS Preference: Jose Ramon.    Called patient for routine telephone follow up call s/p radiation for her lung cancer.  Patient states she is doing well.  CT scan done yesterday and calling results today.  Talked with Dr. Beasley.  Plan RTC for follow up as directed by physician.    Phone call duration: 10 minutes nursing time    Bing Webb RN    "

## 2021-06-08 NOTE — PROGRESS NOTES
[facility] Radiation Oncology Follow Up     Patient: Gabriela Watkins  MRN: [id number]  Date of Service: 06/09/2020       DISEASE TREATED:    1. Non-small cell lung cancer involving left upper lobe, stage T3 N0 M0, status post left upper lobe trisegmentectomy with negative margin.   2. Limited stage small cell lung cancer involving right upper lobe, clinical stage T1 N0 M0 status post 4 cycles of chemotherapy with good response.        TYPE OF RADIATION THERAPY ADMINISTERED:  5000 cGy in 5 treatments given from 3/23/2020-4/1/2020.      INTERVAL SINCE COMPLETION OF RADIATION THERAPY: 2 months.      SUBJECTIVE:  Ms. Watkins is a 79 y.o. female who is a chronic smoker 50 pack a year for many years.  Patient was admitted to the hospital due to the ongoing cholecystitis in June 2019. She had incidental finding of bilateral lung masses.  CT of the chest shows similar findings.  PET scan shows 4 cm lesion in the left lung and 0.8 cm bilobed lesion in the right lung.  No lymph node involvement.  No evidence of distant metastatic disease.  MRI brain also showed no evidence of metastatic disease.  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  Patient underwent left upper lobe trisegmentectomy by Dr. Oneal, thoracic surgeon at [facility] on 8/30/2019.  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  14 removed lymph nodes showed no evidence of metastatic disease.  Postoperatively she has been recovering well.  The patient received 4 cycles of carbo and etoposide chemotherapy for her small cell lung cancer under the supervision of Dr. Niño, medical oncology. The first 2 cycles were given at a 25% reduction and she had a significant response on her CT scan.  She had a lot of toxicity so she got her third and  the fourth cycle at a 50% reduction.  Her last chemotherapy was given in 2/2020.  She received definitive radiation therapy to her right lung cancer with a total dose of 5000 cGy in 5 treatments given from 3/23/2020-4/1/2020.  She tolerated radiation therapy very well with minimal side effect.    The patient has been doing well since completion of radiation therapy.  She denies any ongoing chest pain and shortness of breath at the time of evaluation.  She is here for routine post therapy telephone office follow-up.    Medications were reviewed and are up to date on EPIC.    The following portions of the patient's history were reviewed and updated as appropriate: allergies, current medications, past family history, past medical history, past social history, past surgical history and problem list.    Review of Systems:      General  Constitutional (WDL): Exceptions to WDL  Fatigue: Fatigue relieved by rest  EENT  Eye Disorder (WDL): All eye disorder elements are within defined limits  Ear Disorder (WDL): Exceptions to WDL([place])  Respiratory   Respiratory (WDL): Exceptions to WDL  Cough: Mild symptoms, nonprescription intervention indicated(mostly dry in morning)  Cardiovascular  Cardiovascular (WDL): Exceptions to WDL  Edema: Yes  Edema Limbs: 5 - 10% inter-limb discrepancy in volume or circumference at point of greatest visible difference, swelling or obscuration of anatomic architecture on close inspection(R>L ankle)  Endocrine     Gastrointestinal  Gastrointestinal (WDL): All gastrointestinal elements are within defined limits  Musculoskeletal  Musculoskeletal and Connetive Tissue Disorders (WDL): Exceptions to WDL  Myalgia: Mild pain(occasionally in legs)  Integumentary               Integumentary (WDL): Exceptions to WDL  Alopecia: Hair loss of up to 50% of normal for that individual that is not obvious from a distance but only on close inspection, a different hair style may be required to cover the hair loss but  it does not require a wig or hair piece to camouflage  Neurological  Neurosensory (WDL): Exceptions to WDL  Ataxia: Asymptomatic, clinical or diagnostic observations only, intervention not indicated  Psychological/Emotional   Patient Coping: Accepting;Open/discussion  Hematological/Lymphatic  Lymph (WDL): All lymph disorder elements are within defined limits  Dermatologic     Genitourinary/Reproductive  Genitourinary (WDL): All genitourinary elements are within defined limits  Reproductive     Pain              Currently in Pain: No/denies  Pain Score (Initial OR Reassessment): No/Denies Pain  Accompanied by  Accompanied by: Family Member(daughter Mary Kay)     Imaging: Imaging results 30 days: Ct Chest Without Contrast    Result Date: 6/8/2020  EXAM: CT CHEST WO CONTRAST LOCATION: [facility] DATE/TIME: 6/8/2020 4:35 PM INDICATION: Lung cancer status post S/P RT 03/20/2020 COMPARISON: CT of the chest with contrast 02/24/2020 and PET CT 03/04/2020 TECHNIQUE: CT chest without IV contrast. Multiplanar reformats were obtained. Dose reduction techniques were used. CONTRAST: None. FINDINGS: LUNGS AND PLEURA: Metallic fiducial in the posterolateral right upper lobe has associated beam hardening artifacts. Small adjacent linear finding (series 3, image 59) is unchanged. New patchy, nonsegmental opacities are present in the right upper lobe which are predominantly groundglass but there are small foci of more solid opacity. Associated inflammatory bronchial enlargement. Status post subtotal left upper lobectomy. Unchanged scarring around the staple lines in the left hilum. There are no new left lung opacities. Peripheral linear interstitial thickening in both bases consistent with parenchymal scarring, including a more nodular focus in the right posterior sulcus (series 3, image 160). Underlying emphysema. No pleural fluid or pneumothorax is present. MEDIASTINUM: Cardiac chambers are normal in size. Degenerative  calcification of the aortic root. Fairly diffuse atheromatous calcification of the thoracic aorta with relative sparing of the mid ascending. No thoracic aortic aneurysm. No enlarged mediastinal or hilar lymph nodes are present. The esophagus is decompressed. UPPER ABDOMEN: Multiple bilateral superior renal cysts which require no further workup or follow-up. MUSCULOSKELETAL: Diffuse thoracic spondylosis characterized by disc space narrowing and osteophytosis. No new bone lesions. Symmetric glenohumeral osteoarthrosis.     1.  Development of nonsegmental airspace opacities in the right upper lobe consistent with radiation-induced lung injury. Continued attention on follow-up is suggested.        Impression     The patient is 79 years old female with diagnosis of both non-small cell and small cell lung cancer and recent diagnosis of small cell lung cancer with clinical stage T1 a N0 M0.  She is status post chemotherapy and definitive radiation therapy using SBRT completed 2 months ago with staging work-up which indicated no evidence of recurrence.    Assessment & Plan:     1.  I have personally reviewed her most recent CT scan today.  There is no radiographic evidence of cancer recurrence.  The patient is therefore scheduled to return to radiation oncology in 4 months for office follow-up and restaging CT chest.  2.  Continue long-term follow-up and ongoing care for her lung cancer with Dr. Niño, medical oncology as planned.      Face to face time  25 minutes with > 100% spent on consultation, education and coordination of care.      Stephani Beasley MD, PhD  Department of Radiation Oncology   [facility]  Tel: [phone]  Page: [phone]    [facility]  [address] [place] [address]     [place] [id number] [facility]   [place] [address]    CC:  Patient Care Team:  Kirsty Rianey PA-C as PCP - General (Physician Assistant)  Quintin Niño MD as Physician  (Hematology and Oncology)  Aaliyah Jose RN as Oncology Nurse Navigator (Hematology and Oncology)  Stephani Beasley MD as Physician (Radiation Oncology)

## 2021-06-09 NOTE — PROGRESS NOTES
"Gabriela Watkins is a 79 y.o. female who is being evaluated via a billable telephone visit.      The patient has been notified of following:     \"This telephone visit will be conducted via a call between you and your physician/provider. We have found that certain health care needs can be provided without the need for a physical exam.  This service lets us provide the care you need with a short phone conversation.  If a prescription is necessary we can send it directly to your pharmacy.  If lab work is needed we can place an order for that and you can then stop by our lab to have the test done at a later time.    Telephone visits are billed at different rates depending on your insurance coverage. During this emergency period, for some insurers they may be billed the same as an in-person visit.  Please reach out to your insurance provider with any questions.    If during the course of the call the physician/provider feels a telephone visit is not appropriate, you will not be charged for this service.\"    Patient has given verbal consent to a Telephone visit? Yes    What phone number would you like to be contacted at? [phone]    Phone call duration: 6 minutes    Paradise Gipson RN      "

## 2021-06-09 NOTE — PROGRESS NOTES
[facility] Hematology and Oncology Progress Note    Patient: Gabriela Watkins  MRN: [id number]  Date of Service: 06/25/2020        Reason for Visit    Chief Complaint   Patient presents with     HE Cancer     Small cell lung cancer       Assessment and Plan    T3 N0 M0, stage IIb adenocarcinoma of the left lung status post trisegmentectomy on August 30, 2019  Tumor is PDL 1 negative; K-wilberto mutated, no other alterations noted  Right upper lobe lung nodule, biopsy shows small cell lung cancer diagnosis in October 2019  History of smoking  Mild microcytic anemia    Patient recovering after sequential chemo and radiation.  CT scan couple weeks ago showed no recurrence of cancer.  We will do follow-up with scan in 4 months.    Mild microcytic anemia.  No bleeding symptoms.  Probably related to chemotherapy and is improved.  Will start ferrous sulfate twice daily.  Reviewed symptoms of constipation and dark stools with the iron.    Plan: As above      Measurable disease: CT of the chest    Current therapy:To start radiation therapy      Treatment history:  Carbo etoposide for 4 cycles last starting February 3, 2020   carboplatin and etoposide, today is day 1 cycle 3  Cycle 3 to be administered with 50% of full dose  First 2 cycles administered with 75% of full dose            Cancer Staging  Primary lung adenocarcinoma, left (H)  Staging form: Lung, AJCC 8th Edition  - Clinical stage from 10/15/2019: Stage IIB (cT3, cN0, cM0) - Signed by Quintin Niño MD on 10/15/2019      ECOG Performance   ECOG Performance Status: 1    Distress Assessment  Distress Assessment Score: No distress    Pain         Problem List    1. Small cell lung cancer, right lower lobe (H)  HM1(CBC and Differential)    Ferritin    Vitamin B12   2. Primary lung adenocarcinoma, left (H)     3. Microcytic anemia  HM1(CBC and Differential)    Ferritin    Vitamin B12        CC: Kirsty Rainey  MAYRA    ______________________________________________________________________________    History of Present Illness    Ms. Gabriela Watkins returns for reevaluation.  She completed cycle 4 of chemotherapy just over 3 weeks ago.  Has tolerated much better with the dose reduction.  Appetite and weight are stable.  No new issues.  ECOG status is 1.    Currently in Pain: No/denies    Review of Systems    Constitutional  Constitutional (WDL): Exceptions to WDL  Fatigue: Fatigue relieved by rest  Neurosensory  Neurosensory (WDL): All neurosensory elements are within defined limits  Cardiovascular  Cardiovascular (WDL): Exceptions to WDL  Edema: Yes  Edema Limbs: 5 - 10% inter-limb discrepancy in volume or circumference at point of greatest visible difference, swelling or obscuration of anatomic architecture on close inspection(Bilat ankles)  Pulmonary  Respiratory (WDL): Within Defined Limits  Gastrointestinal  Gastrointestinal (WDL): Exceptions to WDL  Anorexia: Loss of appetite without alteration in eating habits(Up and down)  Diarrhea: Increase of <4 stools per day over baseline, mild increase in ostomy output compared to baseline(Gallbladder out.)  Genitourinary  Genitourinary (WDL): All genitourinary elements are within defined limits  Integumentary  Integumentary (WDL): All integumentary elements are within defined limits  Patient Coping  Patient Coping: Accepting  Distress Assessment  Distress Assessment Score: No distress  Accompanied by  Accompanied by: Family Member(Daughter, Mary Kay)    Past History  Past Medical History:   Diagnosis Date     Acute gangrenous cholecystitis      Arthritis      Cancer (H)      Cerebral aneurysm, unruptured     11mm     Dyslipidemia      Hypertension      Lung nodules      Osteoarthritis      Primary adenocarcinoma of upper lobe of left lung      Short-term memory loss      Stroke (H)          Past Surgical History:   Procedure Laterality Date     BLADDER SURGERY       CATARACT  EXTRACTION       CT BIOPSY LUNG  7/8/2019     CT BIOPSY LUNG  10/30/2019     HYSTERECTOMY  age 30     MEDIASTINOSCOPY N/A 8/8/2019    Procedure: MEDIASTINOSCOPY;  Surgeon: Bernard Rosado MD;  Location: [facility];  Service: Cardiovascular     OOPHORECTOMY  age 30     TN LAP,CHOLECYSTECTOMY N/A 6/3/2019    Procedure: CHOLECYSTECTOMY, LAPAROSCOPIC;  Surgeon: Samson Cobb MD;  Location: [facility] OR;  Service: General       Physical Exam    Recent Vitals 3/23/2020   Weight 144 lbs 5 oz   BSA (m2) 1.65 m2   BP -   Pulse -   Temp -   Temp src -   SpO2 -   Some recent data might be hidden       GENERAL: Alert and oriented to time place and person. Seated comfortably. In no distress.    HEAD: Atraumatic and normocephalic.    EYES: RICKI, EOMI.  No pallor.  No icterus.    Oral cavity: no mucosal lesion or tonsillar enlargement.    NECK: supple. JVP normal.  No thyroid enlargement.    LYMPH NODES: No palpable, cervical, axillary or inguinal lymphadenopathy.    CHEST: clear to auscultation bilaterally.  Resonant to percussion throughout bilaterally.  Symmetrical breath movements bilaterally.    CVS: S1 and S2 are heard. Regular rate and rhythm.  No murmur or gallop or rub heard.  No peripheral edema.    ABDOMEN: Soft. Not tender. Not distended.  No palpable hepatomegaly or splenomegaly.  No other mass palpable.  Bowel sounds heard.    EXTREMITIES: Warm.    SKIN: no rash, or bruising or purpura.  Has a full head of hair.      Lab Results    Recent Results (from the past 168 hour(s))   Magnesium   Result Value Ref Range    Magnesium 2.0 1.8 - 2.6 mg/dL   Comprehensive Metabolic Panel   Result Value Ref Range    Sodium 143 136 - 145 mmol/L    Potassium 4.0 3.5 - 5.0 mmol/L    Chloride 107 98 - 107 mmol/L    CO2 28 22 - 31 mmol/L    Anion Gap, Calculation 8 5 - 18 mmol/L    Glucose 75 70 - 125 mg/dL    BUN 22 8 - 28 mg/dL    Creatinine 1.21 (H) 0.60 - 1.10 mg/dL    GFR MDRD Af Amer 52 (L) >60  mL/min/1.73m2    GFR MDRD Non Af Amer 43 (L) >60 mL/min/1.73m2    Bilirubin, Total 0.3 0.0 - 1.0 mg/dL    Calcium 9.0 8.5 - 10.5 mg/dL    Protein, Total 7.0 6.0 - 8.0 g/dL    Albumin 3.7 3.5 - 5.0 g/dL    Alkaline Phosphatase 126 (H) 45 - 120 U/L    AST 14 0 - 40 U/L    ALT <9 0 - 45 U/L   HM1 (CBC with Diff)   Result Value Ref Range    WBC 6.2 4.0 - 11.0 thou/uL    RBC 4.31 3.80 - 5.40 mill/uL    Hemoglobin 9.8 (L) 12.0 - 16.0 g/dL    Hematocrit 33.7 (L) 35.0 - 47.0 %    MCV 78 (L) 80 - 100 fL    MCH 22.7 (L) 27.0 - 34.0 pg    MCHC 29.1 (L) 32.0 - 36.0 g/dL    RDW 18.2 (H) 11.0 - 14.5 %    Platelets 251 140 - 440 thou/uL    MPV 8.9 8.5 - 12.5 fL    Neutrophils % 62 50 - 70 %    Lymphocytes % 22 20 - 40 %    Monocytes % 14 (H) 2 - 10 %    Eosinophils % 2 0 - 6 %    Basophils % 0 0 - 2 %    Neutrophils Absolute 3.9 2.0 - 7.7 thou/uL    Lymphocytes Absolute 1.3 0.8 - 4.4 thou/uL    Monocytes Absolute 0.9 0.0 - 0.9 thou/uL    Eosinophils Absolute 0.1 0.0 - 0.4 thou/uL    Basophils Absolute 0.0 0.0 - 0.2 thou/uL       Imaging    Ct Chest Without Contrast    Result Date: 6/8/2020  EXAM: CT CHEST WO CONTRAST LOCATION: [facility] DATE/TIME: 6/8/2020 4:35 PM INDICATION: Lung cancer status post S/P RT 03/20/2020 COMPARISON: CT of the chest with contrast 02/24/2020 and PET CT 03/04/2020 TECHNIQUE: CT chest without IV contrast. Multiplanar reformats were obtained. Dose reduction techniques were used. CONTRAST: None. FINDINGS: LUNGS AND PLEURA: Metallic fiducial in the posterolateral right upper lobe has associated beam hardening artifacts. Small adjacent linear finding (series 3, image 59) is unchanged. New patchy, nonsegmental opacities are present in the right upper lobe which are predominantly groundglass but there are small foci of more solid opacity. Associated inflammatory bronchial enlargement. Status post subtotal left upper lobectomy. Unchanged scarring around the staple lines in the left hilum. There are no  new left lung opacities. Peripheral linear interstitial thickening in both bases consistent with parenchymal scarring, including a more nodular focus in the right posterior sulcus (series 3, image 160). Underlying emphysema. No pleural fluid or pneumothorax is present. MEDIASTINUM: Cardiac chambers are normal in size. Degenerative calcification of the aortic root. Fairly diffuse atheromatous calcification of the thoracic aorta with relative sparing of the mid ascending. No thoracic aortic aneurysm. No enlarged mediastinal or hilar lymph nodes are present. The esophagus is decompressed. UPPER ABDOMEN: Multiple bilateral superior renal cysts which require no further workup or follow-up. MUSCULOSKELETAL: Diffuse thoracic spondylosis characterized by disc space narrowing and osteophytosis. No new bone lesions. Symmetric glenohumeral osteoarthrosis.     1.  Development of nonsegmental airspace opacities in the right upper lobe consistent with radiation-induced lung injury. Continued attention on follow-up is suggested.     Mammo Screening Bilateral    Result Date: 6/18/2020  BILATERAL FULL FIELD DIGITAL SCREENING MAMMOGRAM WITH TOMOSYNTHESIS Performed on: 6/18/20. Compared to: 04/27/2019 Mammo Screening Bilateral, 02/21/2018 Mammo Screening Bilateral, and 02/11/2017 Mammo Screening Bilateral Findings: The breasts have scattered areas of fibroglandular densities. There is no radiographic evidence of malignancy. This study was evaluated with the assistance of Computer-Aided Detection. Breast Tomosynthesis was used in interpretation. Repeat routine screening mammogram in one year is recommended. ACR BI-RADS Category 1: Negative        Signed by: Quintin Niño MD

## 2021-06-09 NOTE — TELEPHONE ENCOUNTER
I Spoke with Mary Kay today regarding her mom's SCP. She responded that she has not had time to review it. We discussed adv dir and I explained the rationale as well as the witnessed signature. I advised her to not sign it but bring it into her next appt and the signature will be witnessed and document scanned into the system. She responded that it is just one of those things they mean to do and just have not done. She reports that she has my business card and will give me a call. I advised that I am mostly working remotely but do check my VM. I asked her if she could congratulate her mom for me and she said she would. I thanked her for her time and looked forward to hearing from her. Dewey LEVINE

## 2021-06-09 NOTE — TELEPHONE ENCOUNTER
I called Mary Kay in f/u of her Mom's recent appts in the clinic.  She said her Mom is doing really well.  She got good news with her CT scan which they were happy about.  She has been feeling a little low energy and Dr. Niño started iron pills for her as her hgb is a little low.  She just started taking that.  Mary Kay said they decided to cancel [employer] deliveries.  It was very helpful as Sandra was going through her tx but they didn't feel they needed the service any longer and thought others could benefit more from it.  Her mom was also finding she didn't care for some of the items they sent.    Sandra and Mary Kay are doing well at home and deny a need for additional resources at this time.  I will continue to check in and I invited calls as well.  I did relay that I am working remotely but sill available to help.  She was appreciative of the call.

## 2021-06-09 NOTE — TELEPHONE ENCOUNTER
I spoke with Sandra and her daughter Mary Kay explaining my role with HE and her SCP. I explained the contents and my process for getting it to her and that with her permission I would put it together, mail it to her via certified mail so I can track it. Once delivered, I would call to review. She confirmed his address and I thanked them for their time. Dewey   Aunt at bedside      Leila Serena, [facility]  10/15/18 [id number]

## 2021-06-12 NOTE — TELEPHONE ENCOUNTER
I called Sandra to ask how she is doing and offer resources as needed and her daughter, Mary Kay, answered my call.  Sandra had follow up visits with both Dr. Niño and Dr. Beasley this week and her visits went well.  They were reassured by her CT results.  They also found out her hgb is now in the normal range and she can reduce her iron from twice a day to once a day.  She's noticed her energy has improved as well.  Mary Kay mentioned they still haven't completed her Mom's HCD but plan to do so sometime soon.  They have the [email or web address] document and worksheets at home.  I welcomed calls if they have questions or need assistance with completing her directive.    Mary Kay and Sandra are doing well at home and she does not identify a need for resources at this time.  They still have a couple of gas cards from the [employer] as her Mom has fewer medical appts now, being on observation, and Mary Kay works from home most of the time.  Mary Kay was appreciative of the check in.  I will continue to f/u by phone and I welcomed calls as well.

## 2021-06-12 NOTE — PROGRESS NOTES
[facility] Radiation Oncology Follow Up     Patient: Gabriela Watkins  MRN: [id number]  Date of Service: 10/14/2020       DISEASE TREATED:    1. Non-small cell lung cancer involving left upper lobe, stage T3 N0 M0, status post left upper lobe trisegmentectomy with negative margin.   2. Limited stage small cell lung cancer involving right upper lobe, clinical stage T1 N0 M0 status post 4 cycles of chemotherapy with good response.        TYPE OF RADIATION THERAPY ADMINISTERED:  5000 cGy in 5 treatments given from 3/23/2020-4/1/2020.      INTERVAL SINCE COMPLETION OF RADIATION THERAPY: 6 months.      SUBJECTIVE:  Ms. Watkins is a 80 y.o. female who is a chronic smoker 50 pack a year for many years.  Patient was admitted to the hospital due to the ongoing cholecystitis in June 2019. She had incidental finding of bilateral lung masses.  CT of the chest shows similar findings.  PET scan shows 4 cm lesion in the left lung and 0.8 cm bilobed lesion in the right lung.  No lymph node involvement.  No evidence of distant metastatic disease.  MRI brain also showed no evidence of metastatic disease.  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  Patient underwent left upper lobe trisegmentectomy by Dr. Oneal, thoracic surgeon at [facility] on 8/30/2019.  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  14 removed lymph nodes showed no evidence of metastatic disease.  Postoperatively she has been recovering well.  The patient received 4 cycles of carbo and etoposide chemotherapy for her small cell lung cancer under the supervision of Dr. Niño, medical oncology. The first 2 cycles were given at a 25% reduction and she had a significant response on her CT scan.  She had a lot of toxicity so she got her third and  the fourth cycle at a 50% reduction.  Her last chemotherapy was given in 2/2020.  She received definitive radiation therapy to her right lung cancer with a total dose of 5000 cGy in 5 treatments given from 3/23/2020-4/1/2020.  She tolerated radiation therapy very well with minimal side effect.     The patient has been doing well since completion of radiation therapy.  She denies any ongoing chest pain and shortness of breath at the time of evaluation.  She is here for routine post therapy telephone office follow-up.    Medications were reviewed and are up to date on EPIC.    The following portions of the patient's history were reviewed and updated as appropriate: allergies, current medications, past family history, past medical history, past social history, past surgical history and problem list.    Review of Systems:      General  Constitutional (WDL): All constitutional elements are within defined limits  EENT  Eye Disorder (WDL): All eye disorder elements are within defined limits  Ear Disorder (WDL): All ear disorder elements are within defined limits  Respiratory   Respiratory (WDL): Within Defined Limits  Cardiovascular  Cardiovascular (WDL): All cardiovascular elements are within defined limits  Endocrine     Gastrointestinal  Gastrointestinal (WDL): All gastrointestinal elements are within defined limits  Musculoskeletal  Musculoskeletal and Connetive Tissue Disorders (WDL): Exceptions to WDL  Arthralgia: Mild pain(back)  Integumentary               Integumentary (WDL): All integumentary elements are within defined limits  Neurological  Neurosensory (WDL): Exceptions to WDL  Ataxia: Asymptomatic, clinical or diagnostic observations only, intervention not indicated(mild shuffling gait)  Psychological/Emotional   Patient Coping: Accepting;Open/discussion  Hematological/Lymphatic  Lymph (WDL): All lymph disorder elements are within defined limits  Dermatologic     Genitourinary/Reproductive  Genitourinary  (WDL): All genitourinary elements are within defined limits  Reproductive     Pain              Currently in Pain: No/denies  Accompanied by  Accompanied by: Family Member    Objective:      PHYSICAL EXAMINATION:    /63   Pulse (!) 57   Wt 150 lb 12.8 oz (68.4 kg)   SpO2 98%   BMI 30.46 kg/m      Gen: Alert, in NAD  Eyes: PERRL, EOMI, sclera anicteric  HENT     Head: NC/AT     Ears: No external auricular lesions     Nose/sinus: No rhinorrhea or epistaxis     Oropharynx: MMM, no visible oral lesions  Neck: Supple, full ROM, no LAD  Pulm: No wheezing, stridor or respiratory distress  CV: Well-perfused, no cyanosis, no pedal edema  Abdominal: BS+, soft, nontender, nondistended, no hepatomegaly  Back: No step-offs or pain to palpation along the thoracolumbar spine  Rectal: Deferred  : Deferred  Musculoskeletal: Normal muscle bulk and tone  Skin: Normal color and turgor  Neurologic: A/Ox3, CN II-XII intact, normal gait and station  Psychiatric: Appropriate mood and affect     Imaging: Imaging results 30 days: Ct Chest Without Contrast    Result Date: 10/12/2020  EXAM: CT CHEST WO CONTRAST LOCATION: [facility] DATE/TIME: 10/12/2020 4:06 PM INDICATION: Lung cancer post SBRT in 4/2020 follow up. COMPARISON: Multiple with the most recent from 06/08/2020. TECHNIQUE: CT chest without IV contrast. Multiplanar reformats were obtained. Dose reduction techniques were used. CONTRAST: None. FINDINGS: LUNGS AND PLEURA: Linear consolidation with volume loss in the right upper lobe surrounding the fiducial marker. Patchy groundglass infiltrates in the right upper lobe seen previously are no longer present. Overall findings are compatible with evolving post radiation change in the right upper lobe. Continued surveillance recommended. Stable postoperative changes partially lobectomy left upper lung with associated mild volume loss and linear parenchymal scarring. Mild scattered subpleural scarring or  atelectasis in both lung bases. Tiny calcified granuloma noted in the right lower lung. Mild emphysematous changes in the lungs. No pleural effusions. MEDIASTINUM/AXILLAE: No adenopathy. Normal heart size. Trace amount pericardial fluid. Normal caliber thoracic aorta. Aortic and coronary artery calcification. Esophagus is grossly negative. UPPER ABDOMEN: Cholecystectomy. Numerous bilateral renal cysts, incompletely visualized. MUSCULOSKELETAL: Prominent degenerative changes throughout the spine. No destructive bony lesions.     1.  Evolving postradiation changes in the right upper lung with linear consolidation and volume loss with air bronchograms surrounding a fiducial marker in the right upper lobe. 2.  No CT findings for new or progressive disease in the chest. 3.  Stable postoperative changes left upper lung. 4.  No adenopathy.        Impression     The patient is 80 years old female with diagnosis of both non-small cell and small cell lung cancer and recent diagnosis of small cell lung cancer with clinical stage T1 a N0 M0.  She is status post chemotherapy and definitive radiation therapy using SBRT completed 6 months ago with staging work-up which indicated no evidence of recurrence.    Assessment & Plan:     1.  I have personally reviewed her most recent CT scan today.  There is no radiographic evidence of cancer recurrence.  There are changes from current CT scan which I think is related to the post radiation therapy.  The area actually has significant improved since last CT scan 4 months ago. The patient is therefore scheduled to return to radiation oncology in 4 months for office follow-up and restaging CT chest.    2.  Continue long-term follow-up and ongoing care for her lung cancer with Dr. Niño, medical oncology as planned.        Face to face time  25 minutes with > 100% spent on consultation, education and coordination of care.          Stephani Beasley MD, PhD  Department of Radiation Oncology    [facility]  Tel: [phone]  Page: [phone]    [facility]  [address] [place] [address]     [address]  [place] [address]    CC:  Patient Care Team:  Kirsty Rainey PA-C as PCP - General (Physician Assistant)  Quintin Niño MD as Physician (Hematology and Oncology)  Aaliyah Jose RN as Oncology Nurse Navigator (Hematology and Oncology)  Stephani Beasley MD as Physician (Radiation Oncology)

## 2021-06-12 NOTE — PROGRESS NOTES
Pt here for routine f/u with Dr. Beasley, scan done and results in chart. She is feeling well, really no concerns/complaints. See flowsheet for full assmt and f/u per MD orders.

## 2021-06-12 NOTE — PROGRESS NOTES
[facility] Hematology and Oncology Progress Note    Patient: Gabriela Watkins  MRN: [id number]  Date of Service: 10/15/2020        Reason for Visit    No chief complaint on file.      Assessment and Plan    T3 N0 M0, stage IIb adenocarcinoma of the left lung status post trisegmentectomy on August 30, 2019  Tumor is PDL 1 negative; K-wilberto mutated, no other alterations noted  Right upper lobe lung nodule, biopsy shows small cell lung cancer diagnosis in October 2019  History of smoking  Mild microcytic anemia    CT scan shows no evidence of recurrent disease.  Patient reassured.  Will do follow-up with CT again in 4 months.    Excellent response to oral iron with hemoglobin up to 13.  Can continue on iron once daily.    Plan: As above      Measurable disease: CT of the chest    Current therapy: Observation      Treatment history:    5000 cGy in 5 treatments between March 23 and April 1, 2020      Carbo etoposide for 4 cycles last starting February 3, 2020   carboplatin and etoposide, today is day 1 cycle 3  Cycle 3 to be administered with 50% of full dose  First 2 cycles administered with 75% of full dose            Cancer Staging  Primary lung adenocarcinoma, left (H)  Staging form: Lung, AJCC 8th Edition  - Clinical stage from 10/15/2019: Stage IIB (cT3, cN0, cM0) - Signed by Quintin Niño MD on 10/15/2019      ECOG Performance   ECOG Performance Status: 1    Distress Assessment  Distress Assessment Score: No distress    Pain         Problem List    1. Primary lung adenocarcinoma, left (H)          CC: Kirsty Rainey PA-C    ______________________________________________________________________________    History of Present Illness    Ms. Gabriela Watkins returns for reevaluation.  Seen 3 months ago.  Had CT scan and is here for follow-up.  Continues to have some fatigue.  No other symptoms or problems.  No residual side effects from treatment.  Has been taking iron twice daily.      Currently  in Pain: No/denies    Review of Systems    Constitutional  Constitutional (WDL): Exceptions to WDL  Fatigue: Fatigue relieved by rest  Neurosensory  Neurosensory (WDL): Exceptions to WDL  Ataxia: Asymptomatic, clinical or diagnostic observations only, intervention not indicated  Cardiovascular  Cardiovascular (WDL): Exceptions to WDL(occasional right LE edema)  Pulmonary  Respiratory (WDL): Within Defined Limits  Gastrointestinal  Gastrointestinal (WDL): All gastrointestinal elements are within defined limits  Genitourinary  Genitourinary (WDL): All genitourinary elements are within defined limits  Integumentary  Integumentary (WDL): All integumentary elements are within defined limits  Patient Coping  Patient Coping: Accepting  Distress Assessment  Distress Assessment Score: No distress  Accompanied by  Accompanied by: Family Member(daughter)    Past History  Past Medical History:   Diagnosis Date     Acute gangrenous cholecystitis      Arthritis      Cancer (H)      Cerebral aneurysm, unruptured     11mm     Dyslipidemia      Hypertension      Lung nodules      Osteoarthritis      Primary adenocarcinoma of upper lobe of left lung      Short-term memory loss      Stroke (H)          Past Surgical History:   Procedure Laterality Date     BLADDER SURGERY       CATARACT EXTRACTION       CT BIOPSY LUNG  7/8/2019     CT BIOPSY LUNG  10/30/2019     HYSTERECTOMY  age 30     MEDIASTINOSCOPY N/A 8/8/2019    Procedure: MEDIASTINOSCOPY;  Surgeon: Bernard Rosado MD;  Location: [facility];  Service: Cardiovascular     OOPHORECTOMY  age 30     TX LAP,CHOLECYSTECTOMY N/A 6/3/2019    Procedure: CHOLECYSTECTOMY, LAPAROSCOPIC;  Surgeon: Samson Cobb MD;  Location: [facility];  Service: General       Physical Exam    Recent Vitals 10/15/2020   Weight -   BSA (m2) -   /64   Pulse 58   Temp 97.6   Temp src 1   SpO2 98   Some recent data might be hidden       GENERAL: Alert and oriented to time place and  person. Seated comfortably. In no distress.    HEAD: Atraumatic and normocephalic.    EYES: RICKI, EOMI.  No pallor.  No icterus.    Oral cavity: no mucosal lesion or tonsillar enlargement.    NECK: supple. JVP normal.  No thyroid enlargement.    LYMPH NODES: No palpable, cervical, axillary or inguinal lymphadenopathy.    CHEST: clear to auscultation bilaterally.  Resonant to percussion throughout bilaterally.  Symmetrical breath movements bilaterally.    CVS: S1 and S2 are heard. Regular rate and rhythm.  No murmur or gallop or rub heard.  No peripheral edema.    ABDOMEN: Soft. Not tender. Not distended.  No palpable hepatomegaly or splenomegaly.  No other mass palpable.  Bowel sounds heard.    EXTREMITIES: Warm.    SKIN: no rash, or bruising or purpura.  Has a full head of hair.      Lab Results    Recent Results (from the past 168 hour(s))   HM1 (CBC with Diff)   Result Value Ref Range    WBC 5.0 4.0 - 11.0 thou/uL    RBC 4.39 3.80 - 5.40 mill/uL    Hemoglobin 13.3 12.0 - 16.0 g/dL    Hematocrit 41.4 35.0 - 47.0 %    MCV 94 80 - 100 fL    MCH 30.3 27.0 - 34.0 pg    MCHC 32.1 32.0 - 36.0 g/dL    RDW 15.6 (H) 11.0 - 14.5 %    Platelets 189 140 - 440 thou/uL    MPV 9.2 8.5 - 12.5 fL    Neutrophils % 64 50 - 70 %    Lymphocytes % 24 20 - 40 %    Monocytes % 10 2 - 10 %    Eosinophils % 2 0 - 6 %    Basophils % 0 0 - 2 %    Immature Granulocyte % 0 <=0 %    Neutrophils Absolute 3.2 2.0 - 7.7 thou/uL    Lymphocytes Absolute 1.2 0.8 - 4.4 thou/uL    Monocytes Absolute 0.5 0.0 - 0.9 thou/uL    Eosinophils Absolute 0.1 0.0 - 0.4 thou/uL    Basophils Absolute 0.0 0.0 - 0.2 thou/uL    Immature Granulocyte Absolute 0.0 <=0.0 thou/uL       Imaging    Ct Chest Without Contrast    Result Date: 10/12/2020  EXAM: CT CHEST WO CONTRAST LOCATION: [facility] DATE/TIME: 10/12/2020 4:06 PM INDICATION: Lung cancer post SBRT in 4/2020 follow up. COMPARISON: Multiple with the most recent from 06/08/2020.  TECHNIQUE: CT chest without IV contrast. Multiplanar reformats were obtained. Dose reduction techniques were used. CONTRAST: None. FINDINGS: LUNGS AND PLEURA: Linear consolidation with volume loss in the right upper lobe surrounding the fiducial marker. Patchy groundglass infiltrates in the right upper lobe seen previously are no longer present. Overall findings are compatible with evolving post radiation change in the right upper lobe. Continued surveillance recommended. Stable postoperative changes partially lobectomy left upper lung with associated mild volume loss and linear parenchymal scarring. Mild scattered subpleural scarring or atelectasis in both lung bases. Tiny calcified granuloma noted in the right lower lung. Mild emphysematous changes in the lungs. No pleural effusions. MEDIASTINUM/AXILLAE: No adenopathy. Normal heart size. Trace amount pericardial fluid. Normal caliber thoracic aorta. Aortic and coronary artery calcification. Esophagus is grossly negative. UPPER ABDOMEN: Cholecystectomy. Numerous bilateral renal cysts, incompletely visualized. MUSCULOSKELETAL: Prominent degenerative changes throughout the spine. No destructive bony lesions.     1.  Evolving postradiation changes in the right upper lung with linear consolidation and volume loss with air bronchograms surrounding a fiducial marker in the right upper lobe. 2.  No CT findings for new or progressive disease in the chest. 3.  Stable postoperative changes left upper lung. 4.  No adenopathy.         Signed by: Quintin Niño MD

## 2021-06-15 NOTE — PROGRESS NOTES
[facility] Hematology and Oncology Progress Note    Patient: Gabriela Watkins  MRN: [id number]  Date of Service: 02/18/2021        Reason for Visit    Chief Complaint   Patient presents with     HE Cancer     Primary lung adenocarcinoma, left       Assessment and Plan    T3 N0 M0, stage IIb adenocarcinoma of the left lung status post trisegmentectomy on August 30, 2019  Tumor is PDL 1 negative; K-wilberto mutated, no other alterations noted  Right upper lobe lung nodule, biopsy shows small cell lung cancer diagnosis in October 2019  History of smoking  Mild microcytic anemia    No clinical concern for recurrence of cancer.  CT scan personally reviewed and shows no evidence of recurrence or progression.  Reassured patient.  We will continue observation.  We will do follow-up with CT scan and labs again in 4 months.    Plan: As above      Measurable disease: CT of the chest    Current therapy: Observation      Treatment history:    5000 cGy in 5 treatments between March 23 and April 1, 2020      Carbo etoposide for 4 cycles last starting February 3, 2020   carboplatin and etoposide, today is day 1 cycle 3  Cycle 3 to be administered with 50% of full dose  First 2 cycles administered with 75% of full dose            Cancer Staging  Primary lung adenocarcinoma, left (H)  Staging form: Lung, AJCC 8th Edition  - Clinical stage from 10/15/2019: Stage IIB (cT3, cN0, cM0) - Signed by Quintin Niño MD on 10/15/2019      ECOG Performance   ECOG Performance Status: 1    Distress Assessment  Distress Assessment Score: No distress    Pain         Problem List    1. Primary lung adenocarcinoma, left (H)  CT Chest Without Contrast    HM1(CBC and Differential)    Comprehensive Metabolic Panel   2. Small cell lung cancer, right lower lobe (H)  CT Chest Without Contrast    HM1(CBC and Differential)    Comprehensive Metabolic Panel        CC: Kirsty Rainey,  MAYRA    ______________________________________________________________________________    History of Present Illness    Ms. Gabriela Watkins returns for reevaluation.  Seen 4 months ago.  Had CT scan and is here for follow-up.  Continues to have some fatigue.  No other symptoms or problems.  No residual side effects from treatment.  On iron once daily.  Started on medication for her memory a couple of months ago.  ECOG status is 1.      Currently in Pain: No/denies    Review of Systems    Constitutional  Constitutional (WDL): Exceptions to WDL  Fatigue: Fatigue relieved by rest  Neurosensory  Neurosensory (WDL): All neurosensory elements are within defined limits  Cardiovascular  Cardiovascular (WDL): Exceptions to WDL  Edema: Yes  Edema Limbs: 5 - 10% inter-limb discrepancy in volume or circumference at point of greatest visible difference, swelling or obscuration of anatomic architecture on close inspection(Feet/ankles)  Pulmonary  Respiratory (WDL): Within Defined Limits  Gastrointestinal  Gastrointestinal (WDL): Exceptions to WDL  Diarrhea: Increase of <4 stools per day over baseline, mild increase in ostomy output compared to baseline  Genitourinary  Genitourinary (WDL): All genitourinary elements are within defined limits  Integumentary  Integumentary (WDL): All integumentary elements are within defined limits  Patient Coping  Patient Coping: Accepting  Distress Assessment  Distress Assessment Score: No distress  Accompanied by  Accompanied by: Family Member(Daughter)    Past History  Past Medical History:   Diagnosis Date     Acute gangrenous cholecystitis      Arthritis      Cancer (H)      Cerebral aneurysm, unruptured     11mm     Dyslipidemia      Hypertension      Lung nodules      Osteoarthritis      Primary adenocarcinoma of upper lobe of left lung      Short-term memory loss      Stroke (H)          Past Surgical History:   Procedure Laterality Date     BLADDER SURGERY       CATARACT EXTRACTION        CT BIOPSY LUNG  7/8/2019     CT BIOPSY LUNG  10/30/2019     HYSTERECTOMY  age 30     MEDIASTINOSCOPY N/A 8/8/2019    Procedure: MEDIASTINOSCOPY;  Surgeon: Bernard Rosaod MD;  Location: [facility];  Service: Cardiovascular     OOPHORECTOMY  age 30     AZ LAP,CHOLECYSTECTOMY N/A 6/3/2019    Procedure: CHOLECYSTECTOMY, LAPAROSCOPIC;  Surgeon: Samson Cobb MD;  Location: [facility] OR;  Service: General       Physical Exam    Recent Vitals 2/18/2021   Weight 160 lbs 13 oz   BSA (m2) 1.74 m2   /68   Pulse 54   Temp 97.9   Temp src 1   SpO2 97   Some recent data might be hidden       GENERAL: Alert and oriented to time place and person. Seated comfortably. In no distress.    HEAD: Atraumatic and normocephalic.    EYES: RICKI, EOMI.  No pallor.  No icterus.    Oral cavity: no mucosal lesion or tonsillar enlargement.    NECK: supple. JVP normal.  No thyroid enlargement.    LYMPH NODES: No palpable, cervical, axillary or inguinal lymphadenopathy.    CHEST: clear to auscultation bilaterally.  Resonant to percussion throughout bilaterally.  Symmetrical breath movements bilaterally.    CVS: S1 and S2 are heard. Regular rate and rhythm.  No murmur or gallop or rub heard.  No peripheral edema.    ABDOMEN: Soft. Not tender. Not distended.  No palpable hepatomegaly or splenomegaly.  No other mass palpable.  Bowel sounds heard.    EXTREMITIES: Warm.    SKIN: no rash, or bruising or purpura.  Has a full head of hair.      Lab Results    No results found for this or any previous visit (from the past 168 hour(s)).    Imaging    Ct Chest Without Contrast    Result Date: 2/16/2021  EXAM: CT CHEST WO CONTRAST LOCATION: [facility] DATE/TIME: 2/16/2021 4:17 PM INDICATION: lung cancer post SBRT in/  follow up COMPARISON: CT of the chest without contrast 10/12/2020 TECHNIQUE: CT chest without IV contrast. Multiplanar reformats were obtained. Dose reduction techniques were used. CONTRAST:  None. FINDINGS: LUNGS AND PLEURA: Metallic fiducial in the posterolateral right upper lobe associated with a band of parenchymal opacity consistent with radiation-induced lung fibrosis. Resection staple lines associated with mild linear scarring in the left apex and along the mediastinal pleura anterior to the left hilum. No new airspace opacity or lung nodule. Unchanged mild subpleural interstitial thickening/scarring along the diaphragmatic pleura. Unchanged upper zone predominant emphysema. Trachea and central airways remain patent. No pleural fluid, thickening, or nodularity. MEDIASTINUM: Cardiac chambers are normal in size. No pericardial effusion. Mild degenerative calcification of the aortic root and aortic valve leaflets. Normal caliber thoracic aorta. Moderate atheromatous calcification of the arch and descending thoracic aorta. No discrete enlarged mediastinal or hilar lymph nodes are present. The esophagus is decompressed. UPPER ABDOMEN: Multiple bilateral renal cysts are present which are predominantly fluid attenuation but several are higher attenuation consistent with proteinaceous material or internal hemorrhage, unchanged. These do not require additional workup or follow-up. Prior cholecystectomy. Normal adrenal glands. MUSCULOSKELETAL: Moderate diffuse thoracic and lower cervical spondylosis. No focal compression deformities or aggressive bone lesions. At least moderate bilateral glenohumeral osteoarthrosis. Chest wall soft tissues are symmetric.     1.  Unchanged radiation fibrosis associated with a fiducial in the right upper lobe and linear scarring associated with wedge resections in the left upper lobe/apex. No findings to suggest recurrent or residual tumor in the chest.         Signed by: Quintin Niño MD

## 2021-06-15 NOTE — PROGRESS NOTES
[facility] Radiation Oncology Follow Up     Patient: Gabriela Watkins  MRN: [id number]  Date of Service: 02/18/2021       DISEASE TREATED:    1. Non-small cell lung cancer involving left upper lobe, stage T3 N0 M0, status post left upper lobe trisegmentectomy with negative margin.   2. Limited stage small cell lung cancer involving right upper lobe, clinical stage T1 N0 M0 status post 4 cycles of chemotherapy with good response.        TYPE OF RADIATION THERAPY ADMINISTERED:  5000 cGy in 5 treatments given from 3/23/2020-4/1/2020.      INTERVAL SINCE COMPLETION OF RADIATION THERAPY: 10 months.      SUBJECTIVE:  Ms. Watkins is a 80 y.o. female who is a chronic smoker 50 pack a year for many years.  Patient was admitted to the hospital due to the ongoing cholecystitis in June 2019. She had incidental finding of bilateral lung masses.  CT of the chest shows similar findings.  PET scan shows 4 cm lesion in the left lung and 0.8 cm bilobed lesion in the right lung.  No lymph node involvement.  No evidence of distant metastatic disease.  MRI brain also showed no evidence of metastatic disease.  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  Patient underwent left upper lobe trisegmentectomy by Dr. Oneal, thoracic surgeon at [facility] on 8/30/2019.  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  14 removed lymph nodes showed no evidence of metastatic disease.  Postoperatively she has been recovering well.  The patient received 4 cycles of carbo and etoposide chemotherapy for her small cell lung cancer under the supervision of Dr. Niño, medical oncology. The first 2 cycles were given at a 25% reduction and she had a significant response on her CT scan.  She had a lot of toxicity so she got her third and  the fourth cycle at a 50% reduction.  Her last chemotherapy was given in 2/2020.  She received definitive radiation therapy to her right lung cancer with a total dose of 5000 cGy in 5 treatments given from 3/23/2020-4/1/2020.  She tolerated radiation therapy very well with minimal side effect.     The patient has been doing well since completion of radiation therapy.  She denies any ongoing chest pain and shortness of breath at the time of evaluation.  She is here for routine post therapy office follow-up.    Medications were reviewed and are up to date on EPIC.    The following portions of the patient's history were reviewed and updated as appropriate: allergies, current medications, past family history, past medical history, past social history, past surgical history and problem list.    Review of Systems:      General  Constitutional (WDL): Exceptions to WDL  Fatigue: Fatigue relieved by rest  EENT  Eye Disorder (WDL): All eye disorder elements are within defined limits  Ear Disorder (WDL): All ear disorder elements are within defined limits([place] but unchanged)  Respiratory   Respiratory (WDL): Within Defined Limits  Cardiovascular  Cardiovascular (WDL): Exceptions to WDL  Edema: Yes  Edema Limbs: 5 - 10% inter-limb discrepancy in volume or circumference at point of greatest visible difference, swelling or obscuration of anatomic architecture on close inspection(feet/ankles)  Endocrine     Gastrointestinal  Gastrointestinal (WDL): All gastrointestinal elements are within defined limits  Musculoskeletal  Musculoskeletal and Connetive Tissue Disorders (WDL): All Musculoskeletal and Connetive Tissue Disorder elements are within defined limits  Integumentary               Integumentary (WDL): All integumentary elements are within defined limits  Neurological  Neurosensory (WDL): All neurosensory elements are within defined limits  Psychological/Emotional   Patient Coping:  Accepting;Open/discussion  Hematological/Lymphatic  Lymph (WDL): All lymph disorder elements are within defined limits  Dermatologic     Genitourinary/Reproductive  Genitourinary (WDL): All genitourinary elements are within defined limits  Reproductive     Pain              Currently in Pain: No/denies  Accompanied by  Accompanied by: Family Member    Objective:     PHYSICAL EXAMINATION:    There were no vitals taken for this visit.    Gen: Alert, in NAD    Neck: Supple, full ROM, no LAD  Pulm: No wheezing, stridor or respiratory distress  CV: Well-perfused, no cyanosis, no pedal edema  Rectal: Deferred  : Deferred  Musculoskeletal: Normal muscle bulk and tone  Skin: Normal color and turgor  Neurologic: A/Ox3, CN II-XII intact, normal gait and station  Psychiatric: Appropriate mood and affect     Imaging: Imaging results 30 days: Ct Chest Without Contrast    Result Date: 2/16/2021  EXAM: CT CHEST WO CONTRAST LOCATION: [facility] DATE/TIME: 2/16/2021 4:17 PM INDICATION: lung cancer post SBRT in/  follow up COMPARISON: CT of the chest without contrast 10/12/2020 TECHNIQUE: CT chest without IV contrast. Multiplanar reformats were obtained. Dose reduction techniques were used. CONTRAST: None. FINDINGS: LUNGS AND PLEURA: Metallic fiducial in the posterolateral right upper lobe associated with a band of parenchymal opacity consistent with radiation-induced lung fibrosis. Resection staple lines associated with mild linear scarring in the left apex and along the mediastinal pleura anterior to the left hilum. No new airspace opacity or lung nodule. Unchanged mild subpleural interstitial thickening/scarring along the diaphragmatic pleura. Unchanged upper zone predominant emphysema. Trachea and central airways remain patent. No pleural fluid, thickening, or nodularity. MEDIASTINUM: Cardiac chambers are normal in size. No pericardial effusion. Mild degenerative calcification of the aortic root and  aortic valve leaflets. Normal caliber thoracic aorta. Moderate atheromatous calcification of the arch and descending thoracic aorta. No discrete enlarged mediastinal or hilar lymph nodes are present. The esophagus is decompressed. UPPER ABDOMEN: Multiple bilateral renal cysts are present which are predominantly fluid attenuation but several are higher attenuation consistent with proteinaceous material or internal hemorrhage, unchanged. These do not require additional workup or follow-up. Prior cholecystectomy. Normal adrenal glands. MUSCULOSKELETAL: Moderate diffuse thoracic and lower cervical spondylosis. No focal compression deformities or aggressive bone lesions. At least moderate bilateral glenohumeral osteoarthrosis. Chest wall soft tissues are symmetric.     1.  Unchanged radiation fibrosis associated with a fiducial in the right upper lobe and linear scarring associated with wedge resections in the left upper lobe/apex. No findings to suggest recurrent or residual tumor in the chest.        Impression     The patient is 80 years old female with diagnosis of both non-small cell and small cell lung cancer and recent diagnosis of small cell lung cancer with clinical stage T1a N0 M0.  She is status post chemotherapy and definitive radiation therapy using SBRT completed 10 months ago with staging work-up which indicated no evidence of recurrence.     Assessment & Plan:     1.  I have personally reviewed her most recent CT scan and compared to the previous CT chest and radiation therapy record.  There is no radiographic evidence of cancer recurrence.  There are changes from current CT scan which I think is related to the post radiation therapy.  The area has significant improved since last CT scan 4 months ago. The patient is therefore scheduled to return to radiation oncology in 4 months for office follow-up and restaging CT chest.     2.  Continue long-term follow-up and ongoing care for her lung cancer with   [facility] as planned.        Face to face time  25 minutes with > 100% spent on consultation, education and coordination of care.      Stephani Beasley MD, PhD  Department of Radiation Oncology   [facility]  Tel: [phone]  Page: [phone]    [facility]  [address] [place] [address]     [facility]   [address] [place] [address]    CC:  Patient Care Team:  Kirsty Rainey PA-C as PCP - General (Physician Assistant)  Quintin Niño MD as Physician (Hematology and Oncology)  Aaliyah Jose RN as Oncology Nurse Navigator (Hematology and Oncology)  Stephani Beasley MD as Physician (Radiation Oncology)  Sybil Magdaleno MD as Assigned Heart and Vascular Provider  Quintin Niño MD as Assigned Cancer Care Provider

## 2021-06-15 NOTE — PROGRESS NOTES
Patient seen in clinic today for follow-up of lung cancer.    Paradise Gipson RN, Oncology Clinic   [facility]

## 2021-06-15 NOTE — PROGRESS NOTES
Pt here with her daughter for f/u with Dr. Beasley, saw Dr. Niño today as well. Feeling well, no new concerns. F/u in four months with a scan per Dr. Niño.

## 2021-06-17 NOTE — PATIENT INSTRUCTIONS - HE
Patient Instructions by Quintin Niño MD at 11/15/2019  2:15 PM     Author: Quintin Niño MD Service: -- Author Type: Physician    Filed: 11/15/2019  3:04 PM Encounter Date: 11/15/2019 Status: Addendum    : Quintin Niño MD (Physician)    Related Notes: Original Note by Quintin Niño MD (Physician) filed at 11/15/2019  3:04 PM       Patient Education     Carboplatin injection  Brand Name: Paraplatin  What is this medicine?  CARBOPLATIN (MARIO ALBERTO bree charmaine tin) is a chemotherapy drug. It targets fast dividing cells, like cancer cells, and causes these cells to die. This medicine is used to treat ovarian cancer and many other cancers.  How should I use this medicine?  This drug is usually given as an infusion into a vein. It is administered in a hospital or clinic by a specially trained health care professional.  Talk to your pediatrician regarding the use of this medicine in children. Special care may be needed.  What side effects may I notice from receiving this medicine?  Side effects that you should report to your doctor or health care professional as soon as possible:    allergic reactions like skin rash, itching or hives, swelling of the face, lips, or tongue    signs of infection - fever or chills, cough, sore throat, pain or difficulty passing urine    signs of decreased platelets or bleeding - bruising, pinpoint red spots on the skin, black, tarry stools, nosebleeds    signs of decreased red blood cells - unusually weak or tired, fainting spells, lightheadedness    breathing problems    changes in hearing    changes in vision    chest pain    high blood pressure    low blood counts - This drug may decrease the number of white blood cells, red blood cells and platelets. You may be at increased risk for infections and bleeding.    nausea and vomiting    pain, swelling, redness or irritation at the injection site    pain, tingling, numbness in  the hands or feet    problems with balance, talking, walking    trouble passing urine or change in the amount of urine  Side effects that usually do not require medical attention (report to your doctor or health care professional if they continue or are bothersome):    hair loss    loss of appetite    metallic taste in the mouth or changes in taste  What may interact with this medicine?    medicines for seizures    medicines to increase blood counts like filgrastim, pegfilgrastim, sargramostim    some antibiotics like amikacin, gentamicin, neomycin, streptomycin, tobramycin    vaccines  Talk to your doctor or health care professional before taking any of these medicines:    acetaminophen    aspirin    ibuprofen    ketoprofen    naproxen  What if I miss a dose?  It is important not to miss a dose. Call your doctor or health care professional if you are unable to keep an appointment.  Where should I keep my medicine?  This drug is given in a hospital or clinic and will not be stored at home.  What should I tell my health care provider before I take this medicine?  They need to know if you have any of these conditions:    blood disorders    hearing problems    kidney disease    recent or ongoing radiation therapy    an unusual or allergic reaction to carboplatin, cisplatin, other chemotherapy, other medicines, foods, dyes, or preservatives    pregnant or trying to get pregnant    breast-feeding  What should I watch for while using this medicine?  Your condition will be monitored carefully while you are receiving this medicine. You will need important blood work done while you are taking this medicine.  This drug may make you feel generally unwell. This is not uncommon, as chemotherapy can affect healthy cells as well as cancer cells. Report any side effects. Continue your course of treatment even though you feel ill unless your doctor tells you to stop.  In some cases, you may be given additional medicines to help with  side effects. Follow all directions for their use.  Call your doctor or health care professional for advice if you get a fever, chills or sore throat, or other symptoms of a cold or flu. Do not treat yourself. This drug decreases your body's ability to fight infections. Try to avoid being around people who are sick.  This medicine may increase your risk to bruise or bleed. Call your doctor or health care professional if you notice any unusual bleeding.  Be careful brushing and flossing your teeth or using a toothpick because you may get an infection or bleed more easily. If you have any dental work done, tell your dentist you are receiving this medicine.  Avoid taking products that contain aspirin, acetaminophen, ibuprofen, naproxen, or ketoprofen unless instructed by your doctor. These medicines may hide a fever.  Do not become pregnant while taking this medicine. Women should inform their doctor if they wish to become pregnant or think they might be pregnant. There is a potential for serious side effects to an unborn child. Talk to your health care professional or pharmacist for more information. Do not breast-feed an infant while taking this medicine.  NOTE:This sheet is a summary. It may not cover all possible information. If you have questions about this medicine, talk to your doctor, pharmacist, or health care provider. Copyright  2018 [employer]         Patient Education   Etoposide Solution for injection  What is this medicine?  ETOPOSIDE, -16 (e toe MANGO side) is a chemotherapy drug. It is used to treat testicular cancer, lung cancer, and other cancers.  This medicine may be used for other purposes; ask your health care provider or pharmacist if you have questions.  What should I tell my health care provider before I take this medicine?  They need to know if you have any of these conditions:    infection    kidney disease    low blood counts, like low white cell, platelet, or red cell counts    an unusual or  allergic reaction to etoposide, other chemotherapeutic agents, other medicines, foods, dyes, or preservatives    pregnant or trying to get pregnant    breast-feeding  How should I use this medicine?  This medicine is for infusion into a vein. It is administered in a hospital or clinic by a specially trained health care professional.  Talk to your pediatrician regarding the use of this medicine in children. Special care may be needed.  Overdosage: If you think you have taken too much of this medicine contact a poison control center or emergency room at once.  NOTE: This medicine is only for you. Do not share this medicine with others.  What if I miss a dose?  It is important not to miss your dose. Call your doctor or health care professional if you are unable to keep an appointment.  What may interact with this medicine?    cyclosporine    medicines to increase blood counts like filgrastim, pegfilgrastim, sargramostim    vaccines  This list may not describe all possible interactions. Give your health care provider a list of all the medicines, herbs, non-prescription drugs, or dietary supplements you use. Also tell them if you smoke, drink alcohol, or use illegal drugs. Some items may interact with your medicine.  What should I watch for while using this medicine?  Visit your doctor for checks on your progress. This drug may make you feel generally unwell. This is not uncommon, as chemotherapy can affect healthy cells as well as cancer cells. Report any side effects. Continue your course of treatment even though you feel ill unless your doctor tells you to stop.  In some cases, you may be given additional medicines to help with side effects. Follow all directions for their use.  Call your doctor or health care professional for advice if you get a fever, chills or sore throat, or other symptoms of a cold or flu. Do not treat yourself. This drug decreases your body's ability to fight infections. Try to avoid being  around people who are sick.  This medicine may increase your risk to bruise or bleed. Call your doctor or health care professional if you notice any unusual bleeding.  Be careful brushing and flossing your teeth or using a toothpick because you may get an infection or bleed more easily. If you have any dental work done, tell your dentist you are receiving this medicine.  Avoid taking products that contain aspirin, acetaminophen, ibuprofen, naproxen, or ketoprofen unless instructed by your doctor. These medicines may hide a fever.  Do not become pregnant while taking this medicine. Women should inform their doctor if they wish to become pregnant or think they might be pregnant. There is a potential for serious side effects to an unborn child. Talk to your health care professional or pharmacist for more information. Do not breast-feed an infant while taking this medicine.  What side effects may I notice from receiving this medicine?  Side effects that you should report to your doctor or health care professional as soon as possible:    allergic reactions like skin rash, itching or hives, swelling of the face, lips, or tongue    low blood counts - this medicine may decrease the number of white blood cells, red blood cells and platelets. You may be at increased risk for infections and bleeding.    signs of infection - fever or chills, cough, sore throat, pain or difficulty passing urine    signs of decreased platelets or bleeding - bruising, pinpoint red spots on the skin, black, tarry stools, blood in the urine    signs of decreased red blood cells - unusually weak or tired, fainting spells, lightheadedness    breathing problems    changes in vision    mouth or throat sores or ulcers    pain, redness, swelling or irritation at the injection site    pain, tingling, numbness in the hands or feet    redness, blistering, peeling or loosening of the skin, including inside the mouth    seizures    vomiting  Side effects that  usually do not require medical attention (report to your doctor or health care professional if they continue or are bothersome):    diarrhea    hair loss    loss of appetite    nausea    stomach pain  This list may not describe all possible side effects. Call your doctor for medical advice about side effects. You may report side effects to FDA at 8-939-TDC-0073.  Where should I keep my medicine?  This drug is given in a hospital or clinic and will not be stored at home.  NOTE:This sheet is a summary. It may not cover all possible information. If you have questions about this medicine, talk to your doctor, pharmacist, or health care provider. Copyright  2015 [employer]

## 2021-06-18 NOTE — PATIENT INSTRUCTIONS - HE
Patient Instructions by Tahira Matthews RN at 3/9/2020  2:15 PM     Author: Tahira Matthews RN Service: -- Author Type: Registered Nurse    Filed: 3/9/2020  2:21 PM Encounter Date: 3/9/2020 Status: Signed    : Tahira Matthews RN (Registered Nurse)       Patient Education     Stereotactic Body Radiotherapy (SBRT) for Cancer    Stereotactic body radiotherapy (SBRT) is a form of radiation therapy that is used to treat cancer. Your healthcare provider may suggest SBRT if your cancer cannot be treated with surgery or other methods. SBRT may be used to help cure or control the growth of your cancer. Or, it may be used to help reduce pain and other symptoms caused by your cancer. This sheet tells you more about SBRT and what to expect. If you have more questions about the treatment, talk with your healthcare provider.  How SBRT Works  SBRT uses strong  X-rays to destroy cancer cells. Imaging studies are used to get very clear pictures of the tumor. Then a machine called a linear accelerator (linac) is aimed at the tumor. It sends high doses of X-rays into the tumor. This can help kill cancer cells or slow their growth. It can also shrink the size of the tumor. Each radiation dose is precisely aimed so that normal tissue around the tumor receives little or no radiation. This helps reduce the risk of side effects.  Preparing for Your Treatment  SBRT is performed by a radiation therapy team. This team often includes a radiation healthcare provider, nurse, radiation therapist, physicist, and dosimetrist. Before your treatment begins, youll have one or more visits with your team to plan and prepare for your treatment. This may involve having other tests and procedures done. Prior to your first treatment session:    You may need to have fiducial markers (also called seeds) placed in or near the tumor site. The markers are tiny pieces of gold metal. They show up clearly on imaging studies and are used later to help guide  your radiation treatment. Your healthcare provider will explain this procedure to you in more detail if it is needed.    You may have scans or other imaging tests done. These are used to map out the exact sites in your body that will be treated.    You may have things made that will help hold your body in the same position for each treatment session. These can include molds, masks, rests, and cushions.  Having SBRT Treatments  With SBRT, one to five doses are given over the course of one to two weeks. You and your team will discuss the exact schedule for your treatment in advance. Each treatment session takes about 60 to 90 minutes. Heres what to expect during each session:    You change into a patient gown. The radiation therapist positions you on the treatment table. If positioning devices were made, they are used at this time.    The therapist leaves the room and turns on the machine from outside. He or she watches you on a TV monitor. You and the therapist can speak through an intercom.    X-ray or other scan images are used to make sure that the beams from the machine are positioned and lined up correctly with your body. The beams are then directed at the tumor. You will hear the machine, but you wont feel anything.    You can go home shortly after the treatment is done. Your healthcare provider or nurse will let you know if and when you need to return for your next session.  Possible Side Effects of SBRT  With any form of radiation therapy, healthy cells and tissue around the tumor can also be affected by the treatment. This can lead to side effects, such as fatigue and skin changes. Most side effects go away soon after treatment ends. But some side effects do not occur until months or even years after the treatment. The type of side effects you have and how severe they are will depend on the amount of radiation you get and the part of your body being treated. Your healthcare provider can tell you more about  what side effects to expect and how to manage them. If needed, your healthcare provider may give you medicines to treat some side effects. Your healthcare team can also teach you ways to help cope with the side effects.  Call your healthcare provider if you have any of the following:    Fever of 100.4  F (38  C) or higher, or as directed by your healthcare provider    Shortness of breath or trouble breathing    Tiredness that doesnt go away between treatments    Pain that doesnt go away, especially if its in the same place    A new or unusual lump, bump, or swelling    Dizziness or lightheadedness    Unusual rashes, bruises, or bleeding    Uncontrolled nausea and vomiting    Diarrhea that doesnt improve over time    Skin breakdown or severe pain due to skin irritation    Any new or concerning symptom  Follow-Up  Youll have one or more follow-up visits with your healthcare provider. These allow your healthcare provider to check your health and the progress of your treatment. If more tests or treatments are needed, your healthcare provider will discuss these with you.  Date Last Reviewed: 6/1/2018 2000-2019 [employer]. [address] [place] [address], [place] [address]. All rights reserved. This information is not intended as a substitute for professional medical care. Always follow your healthcare professional's instructions.

## 2021-06-18 NOTE — PATIENT INSTRUCTIONS - HE
Patient Instructions by Tahira Matthews RN at 2/24/2020  2:00 PM     Author: Tahira Matthews RN Service: -- Author Type: Registered Nurse    Filed: 2/24/2020  2:48 PM Encounter Date: 2/24/2020 Status: Signed    : Tahira Matthews RN (Registered Nurse)       Patient Education     Stereotactic Body Radiotherapy (SBRT) for Cancer    Stereotactic body radiotherapy (SBRT) is a form of radiation therapy that is used to treat cancer. Your healthcare provider may suggest SBRT if your cancer cannot be treated with surgery or other methods. SBRT may be used to help cure or control the growth of your cancer. Or, it may be used to help reduce pain and other symptoms caused by your cancer. This sheet tells you more about SBRT and what to expect. If you have more questions about the treatment, talk with your healthcare provider.  How SBRT Works  SBRT uses strong  X-rays to destroy cancer cells. Imaging studies are used to get very clear pictures of the tumor. Then a machine called a linear accelerator (linac) is aimed at the tumor. It sends high doses of X-rays into the tumor. This can help kill cancer cells or slow their growth. It can also shrink the size of the tumor. Each radiation dose is precisely aimed so that normal tissue around the tumor receives little or no radiation. This helps reduce the risk of side effects.  Preparing for Your Treatment  SBRT is performed by a radiation therapy team. This team often includes a radiation healthcare provider, nurse, radiation therapist, physicist, and dosimetrist. Before your treatment begins, youll have one or more visits with your team to plan and prepare for your treatment. This may involve having other tests and procedures done. Prior to your first treatment session:    You may need to have fiducial markers (also called seeds) placed in or near the tumor site. The markers are tiny pieces of gold metal. They show up clearly on imaging studies and are used later to help  guide your radiation treatment. Your healthcare provider will explain this procedure to you in more detail if it is needed.    You may have scans or other imaging tests done. These are used to map out the exact sites in your body that will be treated.    You may have things made that will help hold your body in the same position for each treatment session. These can include molds, masks, rests, and cushions.  Having SBRT Treatments  With SBRT, one to five doses are given over the course of one to two weeks. You and your team will discuss the exact schedule for your treatment in advance. Each treatment session takes about 60 to 90 minutes. Heres what to expect during each session:    You change into a patient gown. The radiation therapist positions you on the treatment table. If positioning devices were made, they are used at this time.    The therapist leaves the room and turns on the machine from outside. He or she watches you on a TV monitor. You and the therapist can speak through an intercom.    X-ray or other scan images are used to make sure that the beams from the machine are positioned and lined up correctly with your body. The beams are then directed at the tumor. You will hear the machine, but you wont feel anything.    You can go home shortly after the treatment is done. Your healthcare provider or nurse will let you know if and when you need to return for your next session.  Possible Side Effects of SBRT  With any form of radiation therapy, healthy cells and tissue around the tumor can also be affected by the treatment. This can lead to side effects, such as fatigue and skin changes. Most side effects go away soon after treatment ends. But some side effects do not occur until months or even years after the treatment. The type of side effects you have and how severe they are will depend on the amount of radiation you get and the part of your body being treated. Your healthcare provider can tell you more  about what side effects to expect and how to manage them. If needed, your healthcare provider may give you medicines to treat some side effects. Your healthcare team can also teach you ways to help cope with the side effects.  Call your healthcare provider if you have any of the following:    Fever of 100.4  F (38  C) or higher, or as directed by your healthcare provider    Shortness of breath or trouble breathing    Tiredness that doesnt go away between treatments    Pain that doesnt go away, especially if its in the same place    A new or unusual lump, bump, or swelling    Dizziness or lightheadedness    Unusual rashes, bruises, or bleeding    Uncontrolled nausea and vomiting    Diarrhea that doesnt improve over time    Skin breakdown or severe pain due to skin irritation    Any new or concerning symptom  Follow-Up  Youll have one or more follow-up visits with your healthcare provider. These allow your healthcare provider to check your health and the progress of your treatment. If more tests or treatments are needed, your healthcare provider will discuss these with you.  Date Last Reviewed: 6/1/2018 2000-2019 [employer]. [address] [place] [address], [place] [address]. All rights reserved. This information is not intended as a substitute for professional medical care. Always follow your healthcare professional's instructions.

## 2021-06-19 NOTE — LETTER
Letter by Bernard Rosado MD at      Author: Bernard Rosado MD Service: -- Author Type: --    Filed:  Encounter Date: 8/1/2019 Status: (Other)         Kisrty Rainey PA-C  [address]  [place] [address]                                  August 1, 2019    Patient: Gabriela Watkins   MR Number: [id number]   YOB: 1940   Date of Visit: 8/1/2019     Dear Dr. Redd PA-C:    Thank you for referring Gabriela Watkins to me for evaluation. Below are the relevant portions of my assessment and plan of care.    If you have questions, please do not hesitate to call me. I look forward to following Gabriela along with you.    Sincerely,        Bernard Rosado MD            MD Alonzo Bahena Rafael S, MD  8/1/2019  4:51 PM  Sign at close encounter  THORACIC SURGERY OFFICE NEW PATIENT VISIT      Dear Dr. Rainey,    I saw Ms. Watkins at Dr. Kent request in consultation for the evaluation and treatment of a LEFT upper lobe adenocarcinoma (cT2aN0) and a RIGHT upper lobe PET positive nodule suspicious for malignancy (cT1aN0).     HPI  Ms. Gabriela Watkins is a 79 y.o. year-old female who was incidentally found to have 2 lung lesions while hospitalized for acute cholecystitis (S/P cholecystectomy) and concomitant ischemic stroke (6/3/2019). She has recovered well from both recent disease processes and is close to her baseline level of activity, albeit still somewhat tired.    Tests   CT chest (6/7/2019):        PET-CT (6/19/2019): JULIANNA mass, 4 cm, SUV 9.2, RUL 0.8 cm nodule SUV 8.6.    CT-guided biopsy and fiducial placement (7/8/2019): adenocarcinoma, TTF negative    Brain MRI (6/4/2019): ischemic stroke, no masses    CT angio brain (6/6/2019): 11 mm cerebral artery aneurysm    PMH    Past Medical History:   Diagnosis Date   ? Acute gangrenous cholecystitis    ? Hypertension    ? Lung nodules    ? Stroke (H)       Past Surgical History:   Procedure Laterality  Date   ? CT BIOPSY LUNG  7/8/2019   ? HYSTERECTOMY  age 30   ? OOPHORECTOMY  age 30   ? ID LAP,CHOLECYSTECTOMY N/A 6/3/2019    Procedure: CHOLECYSTECTOMY, LAPAROSCOPIC;  Surgeon: Samson Cobb MD;  Location: [facility];  Service: General         ETOH negative  TOB     Physical examination  BMI 30  She looks well and follows the conversation well. Steady gait.    From a personal perspective, she lives with her daughter, Stefani, who takes care of all her medical appointments since she has poor short-term memory. Stefani and Ms. Watkins's daughter-in-law, Marilu, are here with her and are very involved in her care.      IMPRESSION   1. Malignant neoplasm of upper lobe of left lung (H)     2. Pulmonary nodule     3. Cerebrovascular accident (CVA), unspecified mechanism (H)        79 y.o. year-old female with a LEFT upper lobe adenocarcinoma (cT2aN0) and a RIGHT upper lobe PET positive nodule suspicious for malignancy (cT1aN0).   Recent ischemic stroke (8 weeks ago; minimal sequelae)    PLAN  I spent a total of 45 minutes with Ms. Watkins and her family, more than 50% of which were spent in counseling, coordination of care, and face-to-face time. I reviewed the plan as follows:    We discussed her case at tumor conference. Initially I thought I would do a mediastinoscopy and if negative a RUL wedge resection in the same setting. However, given the complexity of her medical situation, I believe that just a mediastinoscopy alone is a wiser first step. Depending on how she does with that and the results, we'll decide in what sequence and with which modality to treat the lungs.    Procedure planned: mediastinoscopy. I reviewed the indications, risks, and benefits of the procedure with Ms. Watkins and her family. (Since we are only doing a mediastinoscopy she will not require repeat imaging for this procedure)    DVT prophylaxis: enoxaparin 40 mg SQ in preop holding    Type and cross 2 UPRBC    Stop Plavix  now    Neurology:  I am waiting for a response from the neurologists regarding her risk for surgery 8 weeks after an ischemic stroke.    They had all their questions answered and were in agreement with the plan.  I appreciate the opportunity to participate in the care of your patient and will keep you updated.    Sincerely,

## 2021-06-19 NOTE — LETTER
Letter by Bernard Rosado MD at      Author: Bernard Rosado MD Service: -- Author Type: --    Filed:  Encounter Date: 8/15/2019 Status: (Other)         Kirsty Rainey PA-C  [address]  [place] [address]                                  August 15, 2019    Patient: Gabriela Watkins   MR Number: [id number]   YOB: 1940   Date of Visit: 8/15/2019     Dear Dr. Redd PA-C:    Thank you for referring Gabriela Watkins to me for evaluation. Below are the relevant portions of my assessment and plan of care.    If you have questions, please do not hesitate to call me. I look forward to following Gabriela along with you.    Sincerely,        Bernard Rosado MD            MD Alonzo Bahena Rafael S, MD  8/15/2019  3:11 PM  Sign at close encounter  THORACIC SURGERY ESTABLISHED PATIENT OFFICE VISIT    Dear Dr. Rainey,    I saw Ms. Watkins in follow-up today. The clinical summary follows:     PREOP DIAGNOSIS   1) LEFT upper lobe adenocarcinoma (cT2aN0)   2) RIGHT upper lobe PET positive nodule suspicious for malignancy (cT1aN0)   3) 1 week S/P mediastinoscopy  4) 10 weeks post ischemic stroke with minimal sequelae    PROCEDURE   Mediastinoscopy (8/8/2019)    HISTOPATHOLOGY   4R, 4L, and 7 negative    COMPLICATIONS  None    INTERVAL STUDIES  None    ETOH neg  TOB neg  BMI 29    SUBJECTIVE   Ms. Watkins has recovered quite well, she only has a mild dry cough.    IMPRESSION   1. Malignant neoplasm of upper lobe of left lung (H)     2. Pulmonary nodules       79 year-old female with:  1) LEFT upper lobe adenocarcinoma (cT2aN0)   2) RIGHT upper lobe PET positive nodule suspicious for malignancy (cT1aN0)   3) 1 week S/P mediastinoscopy  4) 10 weeks post ischemic stroke with minimal sequelae    PLAN  I spent a total of 40 minutes with Ms. Watkins and her daughter, Stefani, more than half of which were spent in counseling, coordination of care, and face-to-face time. I  reviewed the plan as follows:    After a long conversation, I decided that a LEFT upper lobe superior trisegmentectomy would be the best next step. The RUL lesion is small and can wait, and the RUL is much more likely to be successfully treated with stereotactic radiation if necessary than the larger JULIANNA known cancer.    1) Procedure planned at [facility] (due to scheduling logistics): LEFT VATS superior trisegmentectomy, with possible thoracotomy.  I reviewed the indications, risks, and benefits of the procedure with Ms. Gabriela Watkins. We discussed the intraoperative risks of bleeding and injury to vital organs, potential postoperative complications including, but not limited to, major respiratory events, arrhythmia, bleeding, infection, reoperation, and death. I explained the anticipated hospital course (+/- 2-4 days) and postoperative recovery including pain control, chest drain management, and variable degrees of dyspnea (or need for supplemental oxygen) and fatigue that tend to get better with time.      2) PAC visit at [facility]    3) Lovenox 30 mg SQ in preop holding    4) Repeat CT chest    5) Type and cross 2 U PRBC    They had all their questions answered and were in agreement with the plan.  I appreciate the opportunity to participate in the care of your patient and will keep you updated.  Sincerely,

## 2021-06-19 NOTE — LETTER
Letter by Aaliyah Jose RN at      Author: Aaliyah Jose RN Service: -- Author Type: --    Filed:  Encounter Date: 6/26/2019 Status: (Other)       Dear Gabriela Watkins    Thank you for choosing [facility] for your care.  We are committed to providing you with the highest quality and compassionate healthcare services.  The following information pertains to your first appointment with our clinic.    Date/Time of appointment: Friday, July 12th 2019 at 2:45 pm.    Note: Please arrive at 2:35 pm.  This allows time to complete forms, possible labs and nursing assessment.     Name of your Physician: Quintin Niño MD    What to bring to your appointment:    Completed Patient History/Initial Nursing Assessment and Medication/Allergy List (these forms were sent to you).    Your current insurance card(s).    Parking:    Please refer to the map included to direct you.  The [facility] is located at the [address] end of [facility] in [place], [place].      After turning onto [address] from [facility], take a right turn at the first stop sign.  We have designated parking on the left, identified as parking for Cancer Care patients (Lot D).     The Code to Enter Lot D is: 0701. This code changes monthly and will always coincide with the current month followed by 01. For example August will be 0801.  The month will continue to change but the 01 will remain constant.  If lot D is full please use Parking Lot A, directly across the street.    Please enter the Cancer Care Center on the north end of the [facility].  You will see a sign on the building.        For Medical Oncology or Hematology appointments, please take the elevator to the second floor to check in.   For Radiation Oncology appointments, please go straight through the double doors and check in.     Also please note appointments can last 1.5-2 hours.      We hope these instructions are helpful to you.  If you have any  questions or concerns, please call us at [phone].  It is our pleasure to assist you.    Warm Regards,  Aaliyah HOLLINGSWORTH Gerrits  Nurse Navigator  [phone]

## 2021-06-27 NOTE — PROGRESS NOTES
Progress Notes by Sary Christina MD at 6/13/2019  1:00 PM     Author: Sary Christina MD Service: -- Author Type: Physician    Filed: 6/13/2019  1:44 PM Encounter Date: 6/13/2019 Status: Signed    : Sary Christina MD (Physician)       Pulmonary Clinic Outpatient Consultation  6/13/2019     Assessment and Plan:   78 year old former smoker with the most pertinent medical history of incidentally discovered JULIANNA mass and RUL bilobulated nodules, presenting for evaluation of abnormal lung findings. Imaging findings concerning for potential pulmonary malignancy.      PET/CT scan at the next available opportunity to help evaluate the JULIANNA and RUL nodules, as well as attempt staging of a potential malignancy    Depending on the PET CT scan results we may need biopsy areas of interest that will help appropriately stage patient malignancy    I plan to call patient (confirmed that they should call her daughter Mary Kay's phone number) discussed next steps in the work-up      I appreciate the opportunity to participate in the care of Gabriela aWtkins.  Please, feel free to contact me at any time.    Sary Christina MD  Pulmonary and Critical Care   ______________________________________________________________________________    CC: JULIANNA mass    HPI:   Gabriela Watkins is a 78 y.o. former smoker with history of recent hospitalization for cholecystitis complicated by acute CVA, presenting today for evaluation of JULIANNA spiculated mass concerning for lung malignancy. She is doing well following her prolonged hospitalization. Her abdominal pain has resolved.  She denies any underlying respiratory issues, including dyspnea, cough, chest pain or tightness.  Denies any recent weight changes.  She is not very mobile at baseline, but states that she would be able to climb approximately 1 flight of stairs without dyspnea or walk about a block.  Her main physical activity detracted appears to be  lower extremity pain.  She is a former smoker with a 50-pack-year history.  She quit smoking 12 years ago.    ROS:  Review of Systems - 10 point ROS reviewed and noted to be negative w/ exceptions as detailed in the HPI.    PMH:  Patient Active Problem List    Diagnosis Date Noted   ? Essential hypertension 06/10/2019   ? Pulmonary mass    ? Lung nodules    ? Quit using tobacco in remote past    ? Fall, initial encounter    ? Nonruptured cerebral aneurysm    ? Acute gangrenous cholecystitis 06/04/2019   ? Acute CVA (cerebrovascular accident) (H) 06/04/2019   ? Cholelithiasis and cholecystitis without obstruction 06/03/2019   ? Acute cholecystitis 06/03/2019       PSH:  Past Surgical History:   Procedure Laterality Date   ? HYSTERECTOMY  age 30   ? OOPHORECTOMY  age 30   ? PA LAP,CHOLECYSTECTOMY N/A 6/3/2019    Procedure: CHOLECYSTECTOMY, LAPAROSCOPIC;  Surgeon: Samson Cobb MD;  Location: [facility] OR;  Service: General       Allergies:  No Known Allergies    Family HX:  Family History   Problem Relation Age of Onset   ? Colon cancer Mother    ? Breast cancer Sister         50s   ? Cancer Maternal Aunt         type unknown       Social Hx:  Social History     Socioeconomic History   ? Marital status:      Spouse name: Not on file   ? Number of children: Not on file   ? Years of education: Not on file   ? Highest education level: Not on file   Occupational History   ? Not on file   Social Needs   ? Financial resource strain: Not on file   ? Food insecurity:     Worry: Not on file     Inability: Not on file   ? Transportation needs:     Medical: Not on file     Non-medical: Not on file   Tobacco Use   ? Smoking status: Not on file   Substance and Sexual Activity   ? Alcohol use: Not on file   ? Drug use: Not on file   ? Sexual activity: Not on file   Lifestyle   ? Physical activity:     Days per week: Not on file     Minutes per session: Not on file   ? Stress: Not on file   Relationships   ? Social  connections:     Talks on phone: Not on file     Gets together: Not on file     Attends [employer] service: Not on file     Active member of club or organization: Not on file     Attends meetings of clubs or organizations: Not on file     Relationship status: Not on file   ? Intimate partner violence:     Fear of current or ex partner: Not on file     Emotionally abused: Not on file     Physically abused: Not on file     Forced sexual activity: Not on file   Other Topics Concern   ? Not on file   Social History Narrative   ? Not on file       Current Meds:  Medication Sig   ? amLODIPine (NORVASC) 5 MG tablet Take 5 mg by mouth daily.   ? atenolol (TENORMIN) 100 MG tablet Take 100 mg by mouth daily. for blood pressure   ? atorvastatin (LIPITOR) 40 MG tablet Take 1 tablet (40 mg total) by mouth at bedtime.   ? clopidogrel (PLAVIX) 75 mg tablet Take 1 tablet (75 mg total) by mouth daily.   ? fluticasone propionate (FLONASE) 50 mcg/actuation nasal spray 1 spray into each nostril daily.   ? furosemide (LASIX) 20 MG tablet Take 20 mg by mouth daily.     Physical Exam:  /68   Pulse 64   Resp 20   Ht 5' (1.524 m)   Wt 147 lb 6.4 oz (66.9 kg)   SpO2 98% Comment: RA  BMI 28.79 kg/m    Gen: pleasant petite elderly woman, alert, oriented, no distress  HEENT: EOMI, MMM; no stridor  CV: RRR, no M/G/R  Resp: equal bilateral air entry, breath sounds clear throughout, no focal crackles or wheezes; able to converse in full sentences w/ no respiratory distress  Abd: soft, nontender, no palpable organomegaly  Skin: no apparent rashes  Ext: no cyanosis, clubbing or edema  Neuro: alert, nonfocal    Labs: personally reviewed in the EMR.    Imaging studies: personally reviewed. Below are the Radiology interpretations.  #. CT chest, w/ contrast, 6/7/19:  LUNGS AND PLEURA: Spiculated elongated anterior left upper lobe mass measuring 4 x 1.8 cm worrisome for malignancy and unchanged compared to recent neck CT. The mass abuts the  pleura anteriorly but does not clearly invade it. In addition, there is a lobulated right upper lobe nodule present having 2 dominant lobulations each measuring 8 mm in size appearing to surround a small bifurcating pulmonary artery.  MEDIASTINUM: No significant lymphadenopathy. Prominent atherosclerotic vascular calcifications.  LIMITED UPPER ABDOMEN: Interval cholecystectomy. Small complex fluid collection seen within the surgical bed. Fatty infiltration of liver. Innumerable renal cysts. Prominent atherosclerotic calcifications.  MUSCULOSKELETAL: Diffuse degenerative changes. No suspicious bony lesions.        PFT's none.

## 2021-06-27 NOTE — PROGRESS NOTES
Progress Notes by Bernard Rosado MD at 8/1/2019  1:30 PM     Author: Bernard Rosado MD Service: -- Author Type: Physician    Filed: 8/1/2019  4:51 PM Encounter Date: 8/1/2019 Status: Signed    : Bernard Rosado MD (Physician)       THORACIC SURGERY OFFICE NEW PATIENT VISIT      Dear Dr. Rainey,    I saw Ms. Watkins at Dr. Kent request in consultation for the evaluation and treatment of a LEFT upper lobe adenocarcinoma (cT2aN0) and a RIGHT upper lobe PET positive nodule suspicious for malignancy (cT1aN0).     HPI  Ms. Gabriela Watkins is a 79 y.o. year-old female who was incidentally found to have 2 lung lesions while hospitalized for acute cholecystitis (S/P cholecystectomy) and concomitant ischemic stroke (6/3/2019). She has recovered well from both recent disease processes and is close to her baseline level of activity, albeit still somewhat tired.    Tests   CT chest (6/7/2019):        PET-CT (6/19/2019): JULIANNA mass, 4 cm, SUV 9.2, RUL 0.8 cm nodule SUV 8.6.    CT-guided biopsy and fiducial placement (7/8/2019): adenocarcinoma, TTF negative    Brain MRI (6/4/2019): ischemic stroke, no masses    CT angio brain (6/6/2019): 11 mm cerebral artery aneurysm    PMH    Past Medical History:   Diagnosis Date   ? Acute gangrenous cholecystitis    ? Hypertension    ? Lung nodules    ? Stroke (H)       Past Surgical History:   Procedure Laterality Date   ? CT BIOPSY LUNG  7/8/2019   ? HYSTERECTOMY  age 30   ? OOPHORECTOMY  age 30   ? ID LAP,CHOLECYSTECTOMY N/A 6/3/2019    Procedure: CHOLECYSTECTOMY, LAPAROSCOPIC;  Surgeon: Samson Cobb MD;  Location: [facility];  Service: General         ETOH negative  TOB     Physical examination  BMI 30  She looks well and follows the conversation well. Steady gait.    From a personal perspective, she lives with her daughter, Stefani, who takes care of all her medical appointments since she has poor short-term memory. Stefani and Ms. Watkins's daughter-in-law,  Marilu, are here with her and are very involved in her care.      IMPRESSION   1. Malignant neoplasm of upper lobe of left lung (H)     2. Pulmonary nodule     3. Cerebrovascular accident (CVA), unspecified mechanism (H)        79 y.o. year-old female with a LEFT upper lobe adenocarcinoma (cT2aN0) and a RIGHT upper lobe PET positive nodule suspicious for malignancy (cT1aN0).   Recent ischemic stroke (8 weeks ago; minimal sequelae)    PLAN  I spent a total of 45 minutes with Ms. Watkins and her family, more than 50% of which were spent in counseling, coordination of care, and face-to-face time. I reviewed the plan as follows:    We discussed her case at tumor conference. Initially I thought I would do a mediastinoscopy and if negative a RUL wedge resection in the same setting. However, given the complexity of her medical situation, I believe that just a mediastinoscopy alone is a wiser first step. Depending on how she does with that and the results, we'll decide in what sequence and with which modality to treat the lungs.    Procedure planned: mediastinoscopy. I reviewed the indications, risks, and benefits of the procedure with Ms. Watkins and her family. (Since we are only doing a mediastinoscopy she will not require repeat imaging for this procedure)    DVT prophylaxis: enoxaparin 40 mg SQ in preop holding    Type and cross 2 UPRBC    Stop Plavix now    Neurology:  I am waiting for a response from the neurologists regarding her risk for surgery 8 weeks after an ischemic stroke.    They had all their questions answered and were in agreement with the plan.  I appreciate the opportunity to participate in the care of your patient and will keep you updated.    Sincerely,

## 2021-06-28 NOTE — PROGRESS NOTES
Progress Notes by Sybil Magdaleno MD at 10/28/2019  1:50 PM     Author: Sybil Magdaleno MD Service: -- Author Type: Physician    Filed: 10/28/2019  2:51 PM Encounter Date: 10/28/2019 Status: Signed    : Sybil Magdaleno MD (Physician)           Thank you, Dr. Rainey, for asking the [facility] Care team to see Ms. Gabriela Watkins to evaluate paroxysmal atrial fibrillation.    Assessment/Recommendations   Assessment:    1.  Paroxysmal atrial fibrillation.  This occurred on the second postoperative day following trisegmentectomy for adenocarcinoma of the lung.  She was given additional IV beta-blocker with subsequent conversion to sinus rhythm.  No further evidence of atrial fibrillation during her brief hospital stay.  She was seen by cardiology and despite a [id number] score of 6, anticoagulation was not initiated.  She was kept on her dose of Plavix and advised to undergo an outpatient event monitor.  This had not been performed as of yet.  At this point, I would recommend a 3-week event monitor to see if she is having asymptomatic episodes of atrial fibrillation.  2.  Status post ischemic stroke.  This occurred around the time of her laparoscopic cholecystectomy in early June for gangrenous gallbladder.  The exact timing of her stroke is unclear.  Echocardiogram demonstrated mild left atrial enlargement and normal LV function without significant valvular disease although no bubble study was performed.  Head CTA demonstrated 50% stenosis in the left internal carotid artery carotids.  There is no mention of atrial fibrillation during that hospitalization.  3.  Essential hypertension, well controlled  4.  Adenocarcinoma of the left upper lobe.  Patient scheduled to undergo biopsy of the right upper lobe with further recommendations per oncology.    Plan:  1.  Discontinue atenolol and increase metoprolol tartrate to 75 mg twice daily  2.  Outpatient 3-week event monitor to look for evidence of recurrent  atrial fibrillation  3.  Follow-up with me in approximately 6 weeks       History of Present Illness    Ms. Gabriela Watkins is a 79 y.o. female with history of essential hypertension, tobacco use, ischemic CVA in June 2019 following emergent laparoscopic cholecystectomy of a gangrenous gallbladder of unknown cause who presents today for consultation regarding paroxysmal atrial fibrillation.  At the time of the patient's gallbladder surgery, she had a chest CT demonstrating evidence of nodules in the left upper and right upper lobe overload.  She has since undergone resection in August demonstrating evidence of adenocarcinoma of the left upper lobe for which she is scheduled to begin radiation therapy.  On the second postoperative day, she had an episode of atrial fibrillation with rapid ventricular response which resolved following IV metoprolol.  She was started on metoprolol tartrate twice daily in addition to the atenolol 100 mg daily she had been taking for many years.  It was advised that she have an outpatient event monitor although this has not been performed yet.  There was also mention of an echocardiogram with bubble study as a bubble study was not performed during her echo in June.  He has had no sense of palpitations, lightheadedness or near syncope.    ECG (personally reviewed): No ECG today    Cardiac Imaging Studies (personally reviewed): Echocardiogram in June demonstrated normal LV function without wall motion abnormality.  No significant valve disease.     Physical Examination Review of Systems   Vitals:    10/28/19 1400   BP: 122/70   Pulse: (!) 56   Resp: 16     Body mass index is 29.43 kg/m .  Wt Readings from Last 3 Encounters:   10/28/19 146 lb (66.2 kg)   10/15/19 144 lb 12.8 oz (65.7 kg)   08/15/19 148 lb (67.1 kg)     General Appearance:   Awake, Alert, No acute distress.   HEENT:  No scleral icterus; the mucous membranes were pink and moist.   Neck: No cervical bruits or jugular venous  distention    Chest: The spine was straight. The chest was symmetric.   Lungs:   Respirations unlabored; the lungs are clear to auscultation. No wheezing   Cardiovascular:    Regular rate and rhythm.  S1, S2 normal.  No murmur or gallop heard.   Abdomen:  No organomegaly, masses, bruits, or tenderness. Bowels sounds are present   Extremities:  No peripheral edema bilaterally.   Skin: No xanthelasma. Warm, Dry.   Musculoskeletal: No tenderness.   Neurologic: Mood and affect are appropriate.    General: WNL  Eyes: WNL  Ears/Nose/Throat: WNL  Lungs: WNL  Heart: WNL  Stomach: WNL  Bladder: WNL  Muscle/Joints: WNL  Skin: WNL  Nervous System: WNL  Mental Health: WNL     Blood: WNL     Medical History  Surgical History Family History Social History   Past Medical History:   Diagnosis Date   ? Acute gangrenous cholecystitis    ? Arthritis    ? Cancer (H)    ? Cerebral aneurysm, unruptured     11mm   ? Dyslipidemia    ? Hypertension    ? Lung nodules    ? Osteoarthritis    ? Primary adenocarcinoma of upper lobe of left lung    ? Short-term memory loss    ? Stroke (H)     Past Surgical History:   Procedure Laterality Date   ? BLADDER SURGERY     ? CATARACT EXTRACTION     ? CT BIOPSY LUNG  7/8/2019   ? HYSTERECTOMY  age 30   ? MEDIASTINOSCOPY N/A 8/8/2019    Procedure: MEDIASTINOSCOPY;  Surgeon: Bernard Rosado MD;  Location: [facility] Main OR;  Service: Cardiovascular   ? OOPHORECTOMY  age 30   ? [place] LAP,CHOLECYSTECTOMY N/A 6/3/2019    Procedure: CHOLECYSTECTOMY, LAPAROSCOPIC;  Surgeon: Samson Cobb MD;  Location: [facility] Main OR;  Service: General    Family History   Problem Relation Age of Onset   ? Colon cancer Mother    ? Breast cancer Sister         50s   ? Cancer Maternal Aunt         type unknown    Social History     Socioeconomic History   ? Marital status:      Spouse name: Not on file   ? Number of children: Not on file   ? Years of education: Not on file   ? Highest education level: Not on file    Occupational History   ? Not on file   Social Needs   ? Financial resource strain: Not on file   ? Food insecurity:     Worry: Not on file     Inability: Not on file   ? Transportation needs:     Medical: Not on file     Non-medical: Not on file   Tobacco Use   ? Smoking status: Former Smoker     Last attempt to quit: 2007     Years since quittin.3   ? Smokeless tobacco: Never Used   Substance and Sexual Activity   ? Alcohol use: Yes     Comment: occassional   ? Drug use: Never   ? Sexual activity: Not on file   Lifestyle   ? Physical activity:     Days per week: Not on file     Minutes per session: Not on file   ? Stress: Not on file   Relationships   ? Social connections:     Talks on phone: Not on file     Gets together: Not on file     Attends [employer] service: Not on file     Active member of club or organization: Not on file     Attends meetings of clubs or organizations: Not on file     Relationship status: Not on file   ? Intimate partner violence:     Fear of current or ex partner: Not on file     Emotionally abused: Not on file     Physically abused: Not on file     Forced sexual activity: Not on file   Other Topics Concern   ? Not on file   Social History Narrative   ? Not on file          Medications  Allergies   Current Outpatient Medications   Medication Sig Dispense Refill   ? acetaminophen (TYLENOL) 500 MG tablet Take 1,000 mg by mouth every 6 (six) hours as needed for pain.     ? amLODIPine (NORVASC) 10 MG tablet Take 10 mg by mouth daily.     ? atorvastatin (LIPITOR) 40 MG tablet Take 1 tablet (40 mg total) by mouth at bedtime. 30 tablet 0   ? clopidogrel (PLAVIX) 75 mg tablet Take 1 tablet (75 mg total) by mouth daily. 30 tablet 0   ? fluticasone propionate (FLONASE) 50 mcg/actuation nasal spray 1 spray into each nostril daily.  2   ? furosemide (LASIX) 20 MG tablet Take 20 mg by mouth daily.  3   ? miscellaneous medical supply Misc Use 1 spray As Directed as needed ([employer]  topical analgesic).     ? potassium chloride (KLOR-CON) 10 MEQ CR tablet Take 1 tab daily     ? metoprolol tartrate (LOPRESSOR) 50 MG tablet Take 1.5 tablets (75 mg total) by mouth 2 (two) times a day. 270 tablet 0   ? oxyCODONE-acetaminophen (PERCOCET/ENDOCET) 5-325 mg per tablet Take 2 tablets by mouth every 4 (four) hours as needed for pain. 10 tablet 0     No current facility-administered medications for this visit.       Allergies   Allergen Reactions   ? Crestor [Rosuvastatin]    ? Simvastatin          Lab Results    Chemistry/lipid CBC Cardiac Enzymes/BNP/TSH/INR   Lab Results   Component Value Date    CHOL 109 06/07/2019    HDL 39 (L) 06/07/2019    LDLCALC 45 06/07/2019    TRIG 126 06/07/2019    CREATININE 0.98 08/08/2019    BUN 24 08/08/2019    K 4.6 08/08/2019     08/08/2019     (H) 08/08/2019    CO2 26 08/08/2019    Lab Results   Component Value Date    WBC 7.4 08/08/2019    HGB 13.2 08/08/2019    HCT 40.5 08/08/2019    MCV 92 08/08/2019     08/08/2019    Lab Results   Component Value Date    TROPONINI 0.01 06/04/2019    TSH 2.51 02/06/2018    INR 0.96 07/08/2019

## 2021-06-28 NOTE — PROGRESS NOTES
Progress Notes by Sybil Magdaleno MD at 12/5/2019  2:30 PM     Author: Sybil Magdaleno MD Service: -- Author Type: Physician    Filed: 12/5/2019  2:59 PM Encounter Date: 12/5/2019 Status: Signed    : Sybil Magdaleno MD (Physician)           Thank you, Dr. Rainey, for asking the [facility] Heart Care team to see Ms. Gabriela Watkins to follow-up on isolated episode of atrial fibrillation..    Assessment/Recommendations   Assessment:    1.  Paroxysmal atrial fibrillation, resolved.  This occurred 2 days following surgical resection of the lung carcinoma.  Patient has had no episodes of palpitations since.  A recent 3-week event monitor showed no evidence of atrial fibrillation.  At this point we will continue with Plavix therapy for documented cerebrovascular disease.  2.  Status post ischemic stroke, possibly embolic from carotid stenosis.  Occurred following cholecystectomy in June with no documentation of atrial fibrillation during that hospitalization.  3.  Lung carcinoma with documented adenocarcinoma in the left and small cell on the right, now undergoing chemotherapy.  4.  Essential hypertension, controlled    Plan:  1.  Continue current medications  2.  Follow-up with cardiology as needed       History of Present Illness    Ms. Gabriela Watkins is a 79 y.o. female with history of hypertension, prior tobacco use and recent documentation of lung carcinoma status post trisegmental wedge resection on the left revealing adenocarcinoma and biopsy on the right demonstrating small cell carcinoma now on chemotherapy who experienced an episode of atrial fibrillation 2 days postoperatively.  She was given additional beta-blocker therapy with subsequent conversion to sinus rhythm.  She had no recurrence of atrial fibrillation during her hospitalization.  I saw her in consult following her discharge and recommended a 3-week event monitor which was recently completed showing no evidence of atrial fibrillation.   She has since started chemotherapy for her lung carcinoma which has been going well.  She reports no palpitations, chest discomfort or shortness of breath.    ECG (personally reviewed): No ECG today    Cardiac Imaging Studies (personally reviewed): No additional imaging     Physical Examination Review of Systems   Vitals:    12/05/19 1434   BP: 112/56   Pulse: (!) 56   Resp: 16     Body mass index is 30.3 kg/m .  Wt Readings from Last 3 Encounters:   12/05/19 150 lb (68 kg)   12/02/19 146 lb 3.2 oz (66.3 kg)   11/15/19 144 lb 11.2 oz (65.6 kg)     General Appearance:   Awake, Alert, No acute distress.   HEENT:  No scleral icterus; the mucous membranes were pink and moist.   Neck: No cervical bruits or jugular venous distention    Chest: The spine was straight. The chest was symmetric.   Lungs:   Respirations unlabored; the lungs are clear to auscultation. No wheezing   Cardiovascular:    Regular rate and rhythm.  S1, S2 normal.  No murmur or gallop   Abdomen:  No organomegaly, masses, bruits, or tenderness. Bowels sounds are present   Extremities:  No peripheral edema bilaterally.   Skin: No xanthelasma. Warm, Dry.   Musculoskeletal: No tenderness.   Neurologic: Mood and affect are appropriate.    General: WNL  Eyes: WNL  Ears/Nose/Throat: WNL  Lungs: WNL  Heart: WNL  Stomach: WNL  Bladder: WNL  Muscle/Joints: WNL  Skin: WNL  Nervous System: WNL  Mental Health: WNL     Blood: WNL     Medical History  Surgical History Family History Social History   Past Medical History:   Diagnosis Date   ? Acute gangrenous cholecystitis    ? Arthritis    ? Cancer (H)    ? Cerebral aneurysm, unruptured     11mm   ? Dyslipidemia    ? Hypertension    ? Lung nodules    ? Osteoarthritis    ? Primary adenocarcinoma of upper lobe of left lung    ? Short-term memory loss    ? Stroke (H)     Past Surgical History:   Procedure Laterality Date   ? BLADDER SURGERY     ? CATARACT EXTRACTION     ? CT BIOPSY LUNG  7/8/2019   ? CT BIOPSY LUNG   10/30/2019   ? HYSTERECTOMY  age 30   ? MEDIASTINOSCOPY N/A 2019    Procedure: MEDIASTINOSCOPY;  Surgeon: Bernard Rosado MD;  Location: [facility] Main OR;  Service: Cardiovascular   ? OOPHORECTOMY  age 30   ? [place] LAP,CHOLECYSTECTOMY N/A 6/3/2019    Procedure: CHOLECYSTECTOMY, LAPAROSCOPIC;  Surgeon: Samson Cobb MD;  Location: [facility] Main OR;  Service: General    Family History   Problem Relation Age of Onset   ? Colon cancer Mother    ? Breast cancer Sister         50s   ? Cancer Maternal Aunt         type unknown    Social History     Socioeconomic History   ? Marital status:      Spouse name: Not on file   ? Number of children: Not on file   ? Years of education: Not on file   ? Highest education level: Not on file   Occupational History   ? Not on file   Social Needs   ? Financial resource strain: Not on file   ? Food insecurity:     Worry: Not on file     Inability: Not on file   ? Transportation needs:     Medical: Not on file     Non-medical: Not on file   Tobacco Use   ? Smoking status: Former Smoker     Last attempt to quit: 2007     Years since quittin.4   ? Smokeless tobacco: Never Used   Substance and Sexual Activity   ? Alcohol use: Yes     Comment: occassional   ? Drug use: Never   ? Sexual activity: Never   Lifestyle   ? Physical activity:     Days per week: Not on file     Minutes per session: Not on file   ? Stress: Not on file   Relationships   ? Social connections:     Talks on phone: Not on file     Gets together: Not on file     Attends [employer] service: Not on file     Active member of club or organization: Not on file     Attends meetings of clubs or organizations: Not on file     Relationship status: Not on file   ? Intimate partner violence:     Fear of current or ex partner: Not on file     Emotionally abused: Not on file     Physically abused: Not on file     Forced sexual activity: Not on file   Other Topics Concern   ? Not on file   Social History  Narrative   ? Not on file          Medications  Allergies   Current Outpatient Medications   Medication Sig Dispense Refill   ? acetaminophen (TYLENOL) 500 MG tablet Take 1,000 mg by mouth every 6 (six) hours as needed for pain.     ? amLODIPine (NORVASC) 10 MG tablet Take 10 mg by mouth daily.     ? atorvastatin (LIPITOR) 40 MG tablet Take 1 tablet (40 mg total) by mouth at bedtime. 30 tablet 0   ? clopidogrel (PLAVIX) 75 mg tablet Take 1 tablet (75 mg total) by mouth daily. 30 tablet 0   ? fluticasone propionate (FLONASE) 50 mcg/actuation nasal spray 1 spray into each nostril daily.  2   ? furosemide (LASIX) 20 MG tablet Take 20 mg by mouth daily.  3   ? metoprolol tartrate (LOPRESSOR) 50 MG tablet Take 1.5 tablets (75 mg total) by mouth 2 (two) times a day. 270 tablet 0   ? miscellaneous medical supply Misc Use 1 spray As Directed as needed ([employer] topical analgesic).     ? potassium chloride (KLOR-CON) 10 MEQ CR tablet Take 1 tab daily     ? prochlorperazine (COMPAZINE) 10 MG tablet Take 1 tablet (10 mg total) by mouth every 6 (six) hours as needed (For breakthrough nausea/vomiting). 30 tablet 1   ? ALPRAZolam (XANAX) 0.25 MG tablet Take 1 dose prior to procedure 5 tablet 0     No current facility-administered medications for this visit.       No Known Allergies      Lab Results    Chemistry/lipid CBC Cardiac Enzymes/BNP/TSH/INR   Lab Results   Component Value Date    CHOL 109 06/07/2019    HDL 39 (L) 06/07/2019    LDLCALC 45 06/07/2019    TRIG 126 06/07/2019    CREATININE 1.09 12/02/2019    BUN 35 (H) 12/02/2019    K 3.8 12/02/2019     12/02/2019     12/02/2019    CO2 23 12/02/2019    Lab Results   Component Value Date    WBC 8.9 12/02/2019    HGB 10.2 (L) 12/02/2019    HCT 32.3 (L) 12/02/2019    MCV 86 12/02/2019     12/02/2019    Lab Results   Component Value Date    TROPONINI 0.01 06/04/2019    TSH 2.51 02/06/2018    INR 1.03 10/30/2019

## 2021-07-03 NOTE — H&P (VIEW-ONLY)
H&P (View-Only) by Sybil Magdaleno MD at 10/28/2019  1:50 PM     Author: Sybil Magdaleno MD Service: -- Author Type: Physician    Filed: 10/28/2019  2:51 PM Date of Service: 10/28/2019  1:50 PM Status: Signed    : Sybil Magdaleno MD (Physician)           Thank you, Dr. Rainey, for asking the [facility] team to see Ms. Gabriela Watkins to evaluate paroxysmal atrial fibrillation.    Assessment/Recommendations   Assessment:    1.  Paroxysmal atrial fibrillation.  This occurred on the second postoperative day following trisegmentectomy for adenocarcinoma of the lung.  She was given additional IV beta-blocker with subsequent conversion to sinus rhythm.  No further evidence of atrial fibrillation during her brief hospital stay.  She was seen by cardiology and despite a [id number] score of 6, anticoagulation was not initiated.  She was kept on her dose of Plavix and advised to undergo an outpatient event monitor.  This had not been performed as of yet.  At this point, I would recommend a 3-week event monitor to see if she is having asymptomatic episodes of atrial fibrillation.  2.  Status post ischemic stroke.  This occurred around the time of her laparoscopic cholecystectomy in early June for gangrenous gallbladder.  The exact timing of her stroke is unclear.  Echocardiogram demonstrated mild left atrial enlargement and normal LV function without significant valvular disease although no bubble study was performed.  Head CTA demonstrated 50% stenosis in the left internal carotid artery carotids.  There is no mention of atrial fibrillation during that hospitalization.  3.  Essential hypertension, well controlled  4.  Adenocarcinoma of the left upper lobe.  Patient scheduled to undergo biopsy of the right upper lobe with further recommendations per oncology.    Plan:  1.  Discontinue atenolol and increase metoprolol tartrate to 75 mg twice daily  2.  Outpatient 3-week event monitor to look for evidence  of recurrent atrial fibrillation  3.  Follow-up with me in approximately 6 weeks       History of Present Illness    Ms. Gabriela Watkins is a 79 y.o. female with history of essential hypertension, tobacco use, ischemic CVA in June 2019 following emergent laparoscopic cholecystectomy of a gangrenous gallbladder of unknown cause who presents today for consultation regarding paroxysmal atrial fibrillation.  At the time of the patient's gallbladder surgery, she had a chest CT demonstrating evidence of nodules in the left upper and right upper lobe overload.  She has since undergone resection in August demonstrating evidence of adenocarcinoma of the left upper lobe for which she is scheduled to begin radiation therapy.  On the second postoperative day, she had an episode of atrial fibrillation with rapid ventricular response which resolved following IV metoprolol.  She was started on metoprolol tartrate twice daily in addition to the atenolol 100 mg daily she had been taking for many years.  It was advised that she have an outpatient event monitor although this has not been performed yet.  There was also mention of an echocardiogram with bubble study as a bubble study was not performed during her echo in June.  He has had no sense of palpitations, lightheadedness or near syncope.    ECG (personally reviewed): No ECG today    Cardiac Imaging Studies (personally reviewed): Echocardiogram in June demonstrated normal LV function without wall motion abnormality.  No significant valve disease.     Physical Examination Review of Systems   Vitals:    10/28/19 1400   BP: 122/70   Pulse: (!) 56   Resp: 16     Body mass index is 29.43 kg/m .  Wt Readings from Last 3 Encounters:   10/28/19 146 lb (66.2 kg)   10/15/19 144 lb 12.8 oz (65.7 kg)   08/15/19 148 lb (67.1 kg)     General Appearance:   Awake, Alert, No acute distress.   HEENT:  No scleral icterus; the mucous membranes were pink and moist.   Neck: No cervical bruits or  jugular venous distention    Chest: The spine was straight. The chest was symmetric.   Lungs:   Respirations unlabored; the lungs are clear to auscultation. No wheezing   Cardiovascular:    Regular rate and rhythm.  S1, S2 normal.  No murmur or gallop heard.   Abdomen:  No organomegaly, masses, bruits, or tenderness. Bowels sounds are present   Extremities:  No peripheral edema bilaterally.   Skin: No xanthelasma. Warm, Dry.   Musculoskeletal: No tenderness.   Neurologic: Mood and affect are appropriate.    General: WNL  Eyes: WNL  Ears/Nose/Throat: WNL  Lungs: WNL  Heart: WNL  Stomach: WNL  Bladder: WNL  Muscle/Joints: WNL  Skin: WNL  Nervous System: WNL  Mental Health: WNL     Blood: WNL     Medical History  Surgical History Family History Social History   Past Medical History:   Diagnosis Date   ? Acute gangrenous cholecystitis    ? Arthritis    ? Cancer (H)    ? Cerebral aneurysm, unruptured     11mm   ? Dyslipidemia    ? Hypertension    ? Lung nodules    ? Osteoarthritis    ? Primary adenocarcinoma of upper lobe of left lung    ? Short-term memory loss    ? Stroke (H)     Past Surgical History:   Procedure Laterality Date   ? BLADDER SURGERY     ? CATARACT EXTRACTION     ? CT BIOPSY LUNG  7/8/2019   ? HYSTERECTOMY  age 30   ? MEDIASTINOSCOPY N/A 8/8/2019    Procedure: MEDIASTINOSCOPY;  Surgeon: Bernard Rosado MD;  [facility] OR;  Service: Cardiovascular   ? OOPHORECTOMY  age 30   ? MT LAP,CHOLECYSTECTOMY N/A 6/3/2019    Procedure: CHOLECYSTECTOMY, LAPAROSCOPIC;  Surgeon: Samson Cobb MD;  Location: [facility] OR;  Service: General    Family History   Problem Relation Age of Onset   ? Colon cancer Mother    ? Breast cancer Sister         50s   ? Cancer Maternal Aunt         type unknown    Social History     Socioeconomic History   ? Marital status:      Spouse name: Not on file   ? Number of children: Not on file   ? Years of education: Not on file   ? Highest education  level: Not on file   Occupational History   ? Not on file   Social Needs   ? Financial resource strain: Not on file   ? Food insecurity:     Worry: Not on file     Inability: Not on file   ? Transportation needs:     Medical: Not on file     Non-medical: Not on file   Tobacco Use   ? Smoking status: Former Smoker     Last attempt to quit: 2007     Years since quittin.3   ? Smokeless tobacco: Never Used   Substance and Sexual Activity   ? Alcohol use: Yes     Comment: occassional   ? Drug use: Never   ? Sexual activity: Not on file   Lifestyle   ? Physical activity:     Days per week: Not on file     Minutes per session: Not on file   ? Stress: Not on file   Relationships   ? Social connections:     Talks on phone: Not on file     Gets together: Not on file     Attends Buddhism service: Not on file     Active member of club or organization: Not on file     Attends meetings of clubs or organizations: Not on file     Relationship status: Not on file   ? Intimate partner violence:     Fear of current or ex partner: Not on file     Emotionally abused: Not on file     Physically abused: Not on file     Forced sexual activity: Not on file   Other Topics Concern   ? Not on file   Social History Narrative   ? Not on file          Medications  Allergies   Current Outpatient Medications   Medication Sig Dispense Refill   ? acetaminophen (TYLENOL) 500 MG tablet Take 1,000 mg by mouth every 6 (six) hours as needed for pain.     ? amLODIPine (NORVASC) 10 MG tablet Take 10 mg by mouth daily.     ? atorvastatin (LIPITOR) 40 MG tablet Take 1 tablet (40 mg total) by mouth at bedtime. 30 tablet 0   ? clopidogrel (PLAVIX) 75 mg tablet Take 1 tablet (75 mg total) by mouth daily. 30 tablet 0   ? fluticasone propionate (FLONASE) 50 mcg/actuation nasal spray 1 spray into each nostril daily.  2   ? furosemide (LASIX) 20 MG tablet Take 20 mg by mouth daily.  3   ? miscellaneous medical supply Misc Use 1 spray As Directed as needed  ([employer] topical analgesic).     ? potassium chloride (KLOR-CON) 10 MEQ CR tablet Take 1 tab daily     ? metoprolol tartrate (LOPRESSOR) 50 MG tablet Take 1.5 tablets (75 mg total) by mouth 2 (two) times a day. 270 tablet 0   ? oxyCODONE-acetaminophen (PERCOCET/ENDOCET) 5-325 mg per tablet Take 2 tablets by mouth every 4 (four) hours as needed for pain. 10 tablet 0     No current facility-administered medications for this visit.       Allergies   Allergen Reactions   ? Crestor [Rosuvastatin]    ? Simvastatin          Lab Results    Chemistry/lipid CBC Cardiac Enzymes/BNP/TSH/INR   Lab Results   Component Value Date    CHOL 109 06/07/2019    HDL 39 (L) 06/07/2019    LDLCALC 45 06/07/2019    TRIG 126 06/07/2019    CREATININE 0.98 08/08/2019    BUN 24 08/08/2019    K 4.6 08/08/2019     08/08/2019     (H) 08/08/2019    CO2 26 08/08/2019    Lab Results   Component Value Date    WBC 7.4 08/08/2019    HGB 13.2 08/08/2019    HCT 40.5 08/08/2019    MCV 92 08/08/2019     08/08/2019    Lab Results   Component Value Date    TROPONINI 0.01 06/04/2019    TSH 2.51 02/06/2018    INR 0.96 07/08/2019

## 2021-07-03 NOTE — ADDENDUM NOTE
Addendum Note by Stephani Morgan MD at 4/1/2020  3:00 PM     Author: Stephani Morgan MD Service: -- Author Type: Physician    Filed: 4/2/2020 12:29 PM Encounter Date: 4/1/2020 Status: Signed    : Stephani Morgan MD (Physician)    Addended by: STEPHANI MORGAN on: 4/2/2020 12:29 PM        Modules accepted: Orders

## 2021-07-03 NOTE — H&P (VIEW-ONLY)
H&P (View-Only) by Sary Christina MD at 6/13/2019  1:00 PM     Author: Sary Christina MD Service: -- Author Type: Physician    Filed: 6/13/2019  1:44 PM Date of Service: 6/13/2019  1:00 PM Status: Signed    : Sary Christina MD (Physician)       Pulmonary Clinic Outpatient Consultation  6/13/2019     Assessment and Plan:   78 year old former smoker with the most pertinent medical history of incidentally discovered JULIANNA mass and RUL bilobulated nodules, presenting for evaluation of abnormal lung findings. Imaging findings concerning for potential pulmonary malignancy.      PET/CT scan at the next available opportunity to help evaluate the JULIANNA and RUL nodules, as well as attempt staging of a potential malignancy    Depending on the PET CT scan results we may need biopsy areas of interest that will help appropriately stage patient malignancy    I plan to call patient (confirmed that they should call her daughter Mary Kay's phone number) discussed next steps in the work-up      I appreciate the opportunity to participate in the care of Gabriela Watkins.  Please, feel free to contact me at any time.    Sary Christina MD  Pulmonary and Critical Care   ______________________________________________________________________________    CC: JULIANNA mass    HPI:   Gabriela Watkins is a 78 y.o. former smoker with history of recent hospitalization for cholecystitis complicated by acute CVA, presenting today for evaluation of JULIANNA spiculated mass concerning for lung malignancy. She is doing well following her prolonged hospitalization. Her abdominal pain has resolved.  She denies any underlying respiratory issues, including dyspnea, cough, chest pain or tightness.  Denies any recent weight changes.  She is not very mobile at baseline, but states that she would be able to climb approximately 1 flight of stairs without dyspnea or walk about a block.  Her main physical activity detracted  appears to be lower extremity pain.  She is a former smoker with a 50-pack-year history.  She quit smoking 12 years ago.    ROS:  Review of Systems - 10 point ROS reviewed and noted to be negative w/ exceptions as detailed in the HPI.    PMH:  Patient Active Problem List    Diagnosis Date Noted   ? Essential hypertension 06/10/2019   ? Pulmonary mass    ? Lung nodules    ? Quit using tobacco in remote past    ? Fall, initial encounter    ? Nonruptured cerebral aneurysm    ? Acute gangrenous cholecystitis 06/04/2019   ? Acute CVA (cerebrovascular accident) (H) 06/04/2019   ? Cholelithiasis and cholecystitis without obstruction 06/03/2019   ? Acute cholecystitis 06/03/2019       PSH:  Past Surgical History:   Procedure Laterality Date   ? HYSTERECTOMY  age 30   ? OOPHORECTOMY  age 30   ? CT LAP,CHOLECYSTECTOMY N/A 6/3/2019    Procedure: CHOLECYSTECTOMY, LAPAROSCOPIC;  Surgeon: Samson Cobb MD;  Location: [facility] OR;  Service: General       Allergies:  No Known Allergies    Family HX:  Family History   Problem Relation Age of Onset   ? Colon cancer Mother    ? Breast cancer Sister         50s   ? Cancer Maternal Aunt         type unknown       Social Hx:  Social History     Socioeconomic History   ? Marital status:      Spouse name: Not on file   ? Number of children: Not on file   ? Years of education: Not on file   ? Highest education level: Not on file   Occupational History   ? Not on file   Social Needs   ? Financial resource strain: Not on file   ? Food insecurity:     Worry: Not on file     Inability: Not on file   ? Transportation needs:     Medical: Not on file     Non-medical: Not on file   Tobacco Use   ? Smoking status: Not on file   Substance and Sexual Activity   ? Alcohol use: Not on file   ? Drug use: Not on file   ? Sexual activity: Not on file   Lifestyle   ? Physical activity:     Days per week: Not on file     Minutes per session: Not on file   ? Stress: Not on file    Relationships   ? Social connections:     Talks on phone: Not on file     Gets together: Not on file     Attends Baptism service: Not on file     Active member of club or organization: Not on file     Attends meetings of clubs or organizations: Not on file     Relationship status: Not on file   ? Intimate partner violence:     Fear of current or ex partner: Not on file     Emotionally abused: Not on file     Physically abused: Not on file     Forced sexual activity: Not on file   Other Topics Concern   ? Not on file   Social History Narrative   ? Not on file       Current Meds:  Medication Sig   ? amLODIPine (NORVASC) 5 MG tablet Take 5 mg by mouth daily.   ? atenolol (TENORMIN) 100 MG tablet Take 100 mg by mouth daily. for blood pressure   ? atorvastatin (LIPITOR) 40 MG tablet Take 1 tablet (40 mg total) by mouth at bedtime.   ? clopidogrel (PLAVIX) 75 mg tablet Take 1 tablet (75 mg total) by mouth daily.   ? fluticasone propionate (FLONASE) 50 mcg/actuation nasal spray 1 spray into each nostril daily.   ? furosemide (LASIX) 20 MG tablet Take 20 mg by mouth daily.     Physical Exam:  /68   Pulse 64   Resp 20   Ht 5' (1.524 m)   Wt 147 lb 6.4 oz (66.9 kg)   SpO2 98% Comment: RA  BMI 28.79 kg/m    Gen: pleasant petite elderly woman, alert, oriented, no distress  HEENT: EOMI, MMM; no stridor  CV: RRR, no M/G/R  Resp: equal bilateral air entry, breath sounds clear throughout, no focal crackles or wheezes; able to converse in full sentences w/ no respiratory distress  Abd: soft, nontender, no palpable organomegaly  Skin: no apparent rashes  Ext: no cyanosis, clubbing or edema  Neuro: alert, nonfocal    Labs: personally reviewed in the EMR.    Imaging studies: personally reviewed. Below are the Radiology interpretations.  #. CT chest, w/ contrast, 6/7/19:  LUNGS AND PLEURA: Spiculated elongated anterior left upper lobe mass measuring 4 x 1.8 cm worrisome for malignancy and unchanged compared to recent  neck CT. The mass abuts the pleura anteriorly but does not clearly invade it. In addition, there is a lobulated right upper lobe nodule present having 2 dominant lobulations each measuring 8 mm in size appearing to surround a small bifurcating pulmonary artery.  MEDIASTINUM: No significant lymphadenopathy. Prominent atherosclerotic vascular calcifications.  LIMITED UPPER ABDOMEN: Interval cholecystectomy. Small complex fluid collection seen within the surgical bed. Fatty infiltration of liver. Innumerable renal cysts. Prominent atherosclerotic calcifications.  MUSCULOSKELETAL: Diffuse degenerative changes. No suspicious bony lesions.        PFT's none.

## 2021-07-03 NOTE — ADDENDUM NOTE
Addendum Note by Chinyere Pearson RN at 7/12/2019  2:45 PM     Author: Chinyere Pearson RN Service: -- Author Type: Registered Nurse    Filed: 7/22/2019  3:21 PM Encounter Date: 7/12/2019 Status: Signed    : Chinyere Pearson RN (Registered Nurse)    Addended by: CHINYERE PEARSON on: 7/22/2019 03:21 PM        Modules accepted: Orders

## 2021-07-08 NOTE — PRE-PROCEDURE
Pre-Procedure by Yoseph Sue MD at 7/8/2021  8:15 AM     Author: Yoseph Sue MD Service: Interventional Radiology Author Type: Physician    Filed: 7/8/2021  8:15 AM Date of Service: 7/8/2021  8:15 AM Status: Signed    : Yoseph Sue MD (Physician)         Procedure Name: CT guided left lung biopsy and fiducial placement. Chest tube placement in the event of a clinically-significant pneumothorax.  Date/Time: 7/8/2021 8:14 AM  Written consent obtained?: Yes  Risks and benefits: Risks, benefits and alternatives were discussed  Consent given by: patient  Expected level of sedation: moderate  ASA Class: Class 3- Severe systemic disease, definite functional limitations  Patient states understanding of procedure being performed: Yes  Patient's understanding of procedure matches consent: Yes  Procedure consent matches procedure scheduled: Yes  Appropriately NPO: yes  Lungs: lungs clear with good breath sounds bilaterally  Heart: normal heart sounds and rate  History & Physical reviewed: History and physical reviewed and no updates needed  Statement of review: I have reviewed the lab findings, diagnostic data, medications, and the plan for sedation

## 2021-07-08 NOTE — H&P (VIEW-ONLY)
H&P (View-Only) by Quintin Niño MD at 6/18/2021  2:15 PM     Author: Quintin Niño MD Service: -- Author Type: Physician    Filed: 6/21/2021 12:47 PM Date of Service: 6/18/2021  2:15 PM Status: Signed    : Quintin Niño MD (Physician)       [facility] Hematology and Oncology Progress Note    Patient: Gabriela Watkins  MRN: [id number]  Date of Service: 06/18/2021        Reason for Visit    Chief Complaint   Patient presents with   ? HE Cancer     Primary lung adenocarcinoma       Assessment and Plan    T3 N0 M0, stage IIb adenocarcinoma of the left lung status post trisegmentectomy on August 30, 2019  Tumor is PDL 1 negative; K-wilberto mutated, no other alterations noted  Right upper lobe lung nodule, biopsy shows small cell lung cancer diagnosis in October 2019  History of smoking  Mild microcytic anemia    CT scans are reviewed and there is increase in size of the left lower lung medial nodule.  Reviewed with patient and her daughter.  We will schedule biopsy and fiducial placement and follow-up to review results.  If non-small cell lung cancer can consider stereotactic radiation otherwise we will need to discuss systemic treatment again if it is small cell cancer.  Questions answered.    Plan: As above    Measurable disease: CT of the chest    Current therapy: Observation      Treatment history:    5000 cGy in 5 treatments between March 23 and April 1, 2020      Carbo etoposide for 4 cycles last starting February 3, 2020   carboplatin and etoposide, today is day 1 cycle 3  Cycle 3 to be administered with 50% of full dose  First 2 cycles administered with 75% of full dose            Cancer Staging  Primary lung adenocarcinoma, left (H)  Staging form: Lung, AJCC 8th Edition  - Clinical stage from 10/15/2019: Stage IIB (cT3, cN0, cM0) - Signed by Quintin Niño MD on 10/15/2019      ECOG Performance        Distress Assessment  Distress Assessment  Score: No distress    Pain         Problem List    1. Nodule of lower lobe of left lung  CT Lung Biopsy    CT Fiducial Placement Lung   2. Primary lung adenocarcinoma, left (H)     3. Small cell lung cancer, right lower lobe (H)          CC: Kirsty Rainey PA-C    ______________________________________________________________________________    History of Present Illness    Ms. Gabriela Watkins returns for reevaluation.  Seen 4 months ago.  Now about 14 months out from end of radiation treatment for small cell lung cancer.  Still with some fatigue issues.  ECOG status is 1-2.  No other new symptoms or problems.    Currently in Pain: Yes    Review of Systems    As per the HPI.       Patient Coping     Distress Assessment  Distress Assessment Score: No distress  Accompanied by  Accompanied by: Family Member    Past History  Past Medical History:   Diagnosis Date   ? Acute gangrenous cholecystitis    ? Arthritis    ? Cancer (H)    ? Cerebral aneurysm, unruptured     11mm   ? Dyslipidemia    ? Hypertension    ? Lung nodules    ? Osteoarthritis    ? Primary adenocarcinoma of upper lobe of left lung    ? Short-term memory loss    ? Stroke (H)          Past Surgical History:   Procedure Laterality Date   ? BLADDER SURGERY     ? CATARACT EXTRACTION     ? CT BIOPSY LUNG  7/8/2019   ? CT BIOPSY LUNG  10/30/2019   ? HYSTERECTOMY  age 30   ? MEDIASTINOSCOPY N/A 8/8/2019    Procedure: MEDIASTINOSCOPY;  Surgeon: Bernard Rosado MD;  Location: [facility] OR;  Service: Cardiovascular   ? OOPHORECTOMY  age 30   ? MI LAP,CHOLECYSTECTOMY N/A 6/3/2019    Procedure: CHOLECYSTECTOMY, LAPAROSCOPIC;  Surgeon: Samson Cobb MD;  Location: [facility] OR;  Service: General       Physical Exam    Recent Vitals 6/18/2021   Weight 155 lbs   BSA (m2) 1.71 m2   /57   Pulse 46   Temp 99.4   Temp src 1   SpO2 96   Some recent data might be hidden       GENERAL: Alert and oriented to time place and person. Seated  comfortably. In no distress.    HEAD: Atraumatic and normocephalic.    EYES: RICKI, EOMI.  No pallor.  No icterus.    Oral cavity: no mucosal lesion or tonsillar enlargement.    NECK: supple. JVP normal.  No thyroid enlargement.    LYMPH NODES: No palpable, cervical, axillary or inguinal lymphadenopathy.    CHEST: clear to auscultation bilaterally.  Resonant to percussion throughout bilaterally.  Symmetrical breath movements bilaterally.    CVS: S1 and S2 are heard. Regular rate and rhythm.  No murmur or gallop or rub heard.  No peripheral edema.    ABDOMEN: Soft. Not tender. Not distended.  No palpable hepatomegaly or splenomegaly.  No other mass palpable.  Bowel sounds heard.    EXTREMITIES: Warm.    SKIN: no rash, or bruising or purpura.  Has a full head of hair.      Lab Results    Recent Results (from the past 168 hour(s))   Comprehensive Metabolic Panel   Result Value Ref Range    Sodium 144 136 - 145 mmol/L    Potassium 3.4 (L) 3.5 - 5.0 mmol/L    Chloride 107 98 - 107 mmol/L    CO2 29 22 - 31 mmol/L    Anion Gap, Calculation 8 5 - 18 mmol/L    Glucose 134 (H) 70 - 125 mg/dL    BUN 24 8 - 28 mg/dL    Creatinine 1.17 (H) 0.60 - 1.10 mg/dL    GFR MDRD Af Amer 54 (L) >60 mL/min/1.73m2    GFR MDRD Non Af Amer 44 (L) >60 mL/min/1.73m2    Bilirubin, Total 0.8 0.0 - 1.0 mg/dL    Calcium 9.1 8.5 - 10.5 mg/dL    Protein, Total 6.7 6.0 - 8.0 g/dL    Albumin 3.7 3.5 - 5.0 g/dL    Alkaline Phosphatase 95 45 - 120 U/L    AST 11 0 - 40 U/L    ALT 11 0 - 45 U/L   HM1 (CBC with Diff)   Result Value Ref Range    WBC 7.0 4.0 - 11.0 thou/uL    RBC 4.19 3.80 - 5.40 mill/uL    Hemoglobin 13.4 12.0 - 16.0 g/dL    Hematocrit 40.9 35.0 - 47.0 %    MCV 98 80 - 100 fL    MCH 32.0 27.0 - 34.0 pg    MCHC 32.8 32.0 - 36.0 g/dL    RDW 13.0 11.0 - 14.5 %    Platelets 175 140 - 440 thou/uL    MPV 9.5 8.5 - 12.5 fL    Neutrophils % 66 50 - 70 %    Lymphocytes % 23 20 - 40 %    Monocytes % 9 2 - 10 %    Eosinophils % 1 0 - 6 %    Basophils %  0 0 - 2 %    Immature Granulocyte % 0 <=0 %    Neutrophils Absolute 4.6 2.0 - 7.7 thou/uL    Lymphocytes Absolute 1.6 0.8 - 4.4 thou/uL    Monocytes Absolute 0.7 0.0 - 0.9 thou/uL    Eosinophils Absolute 0.1 0.0 - 0.4 thou/uL    Basophils Absolute 0.0 0.0 - 0.2 thou/uL    Immature Granulocyte Absolute 0.0 <=0.0 thou/uL       Imaging    Ct Chest Without Contrast    Result Date: 6/14/2021  EXAM: CT CHEST WO CONTRAST LOCATION: [facility] DATE/TIME: 6/14/2021 3:58 PM INDICATION: fu lung cancer COMPARISON: CT of the chest 2/16/2021, 10/12/2020 and 6/8/2020; PET/CT 3/4/2020 TECHNIQUE: CT chest without IV contrast. Multiplanar reformats were obtained. Dose reduction techniques were used. CONTRAST: None. FINDINGS: LUNGS AND PLEURA: Status post subtotal left upper lobectomy. No new soft tissue thickening along the margin of bronchial resection. Focus of groundglass attenuation in the superior segment left lower lobe is unchanged measuring 8 mm long axis (series 5, image 76). A solid nodule in the medial basal left lower lobe measures 8 x 10 mm (series 5, image 196) which was present in retrospect measuring 4 x 5 mm 10/12/2020. Focal band of opacity around the fiducial marker in the right upper lobe extends from the right hilum to the lateral right apex consistent with cicatrization atelectasis, unchanged. No new right lung nodules. Symmetric subpleural interstitial thickening/fibrosis is present in both lower lobes along the diaphragmatic pleura. MEDIASTINUM: Cardiac chambers are normal in size. No pericardial effusion. Degenerative calcification of the aortic root and thickening/calcification of aortic valve leaflets. Moderate diffuse atheromatous calcification of the normal caliber thoracic aorta. No new enlarged mediastinal or hilar lymph nodes. Esophagus is decompressed. CORONARY ARTERY CALCIFICATION: Moderate. UPPER ABDOMEN: Multiple cysts arise from the upper poles of both kidneys which do not  require specific workup or follow-up. Prior cholecystectomy. MUSCULOSKELETAL: Diffuse thoracic spondylosis characterized by loss of disc space height, discogenic sclerosis and marginal osteophytes. No spondylolisthesis. No aggressive or destructive bone lesions.     1.  Status post left upper lobectomy. No findings to suggest local recurrence at the margin of resection. 2.  Metallic fiducial in the right upper lobe with surrounding radiation fibrosis. No findings to suggest local tumor recurrence in the right upper lobe. 3.  Definite growth of a solid nodule in the medial left lower lobe highly suspect for malignancy. Characterization with PET/CT and/or biopsy is suggested.         Signed by: Quintin Niño MD

## 2021-07-13 NOTE — LETTER
7/13/2021    Renay Arguello MD  [facility] [address]  [place] [address]    RE: Gabriela Watkins       Dear Colleague,    I had the pleasure of seeing Gabriela Watkins in the [facility].        Thank you, Dr. Renay Arguello, for asking the [facility] team to see Ms. Gabriela Watkins to evaluate bradycardia.    Assessment/Recommendations   Assessment:    1.  Bradycardia, likely due to high dose beta-blocker therapy in the setting of possibly some underlying conduction disease.  We will check an ECG today but recommended decreasing metoprolol tartrate dosing and obtaining a follow-up Holter monitor.  2.  Paroxysmal atrial fibrillation, suppressed with high-dose beta-blocker.  This was only documented following her lung surgery 2 years ago.  No clinical evidence of recurrence although uncertain how symptomatic she may have been in the past.  We will need to monitor going forward as we cut back beta-blocker therapy.  3.  Lung carcinoma status post recent biopsy for possible recurrence.  Biopsy suggests a well differentiated adenocarcinoma.  Further recommendations per oncology.  4.  Essential hypertension, well controlled    Plan:  1.  Check ECG today  2.  Likely decrease metoprolol tartrate to 25 mg twice daily.  3.  Follow-up Holter in 1 week with further recommendation to follow.       History of Present Illness    Ms. Gabriela Watkins is a 80 year old female with lung carcinoma status post resection complicated by paroxysmal atrial fibrillation, essential hypertension  who presents today for evaluation of bradycardia.  She was recently found to have an enlarged lymph node on the left lung and presented last week for a biopsy where she was noted to have heart rates in the 40s.  They did and she is now here for further recommendation do a biopsy but did not adjust her dose of metoprolol.  Patient does  "report increased fatigue and lightheadedness but denies any near syncope or true syncope.  Also denies any shortness of breath at rest, orthopnea, PND or lower extremity edema.    ECG (personally reviewed): ECG today demonstrates marked sinus bradycardia rate 46, Axis normal.  T wave abnormality in the anterior leads possibly suggestive of ischemia.    Cardiac Imaging Studies (personally reviewed): No recent cardiac imaging     Physical Examination Review of Systems   /70 (BP Location: Left arm, Patient Position: Sitting, Cuff Size: Adult Large)   Pulse 56   Resp 12   Ht 1.499 m (4' 11\")   Wt 67.7 kg (149 lb 4.8 oz)   BMI 30.15 kg/m    Body mass index is 30.15 kg/m .  Wt Readings from Last 3 Encounters:   07/13/21 67.7 kg (149 lb 4.8 oz)   06/18/21 70.3 kg (155 lb)   02/18/21 72.9 kg (160 lb 12.8 oz)     General Appearance:   Awake, Alert, No acute distress.   HEENT:  No scleral icterus; the mucous membranes were pink and moist.   Neck: No cervical bruits or jugular venous distention    Chest: The spine was straight. The chest was symmetric.   Lungs:   Respirations unlabored; the lungs are clear to auscultation. No wheezing   Cardiovascular:    Regular rhythm which is bradycardic.  S1, S2 normal.  No murmur or gallop   Abdomen:  No organomegaly, masses, bruits, or tenderness. Bowels sounds are present   Extremities:  No peripheral edema   Skin: No xanthelasma. Warm, Dry.   Musculoskeletal: No tenderness.   Neurologic: Mood and affect are appropriate.    Enc Vitals  BP: 106/70  Pulse: 56  Resp: 12  Weight: 67.7 kg (149 lb 4.8 oz)  Height: 149.9 cm (4' 11\")                                         Medical History  Surgical History Family History Social History   Past Medical History:   Diagnosis Date     Acute gangrenous cholecystitis      Arthritis      Cancer (H)      Cerebral aneurysm     11mm     CVA (cerebral vascular accident) (H)      Dyslipidemia      HTN (hypertension)      Hypertension      Lung " nodule      Lung nodules      Malignant neoplasm of upper lobe of left lung (H)      Osteoarthritis      Primary adenocarcinoma of upper lobe of left lung (H)      Short-term memory loss      Stroke (H)     Past Surgical History:   Procedure Laterality Date     BLADDER SURGERY       BLADDER SURGERY       C LAP,CHOLECYSTECTOMY/EXPLORE N/A 6/3/2019    Procedure: CHOLECYSTECTOMY, LAPAROSCOPIC;  Surgeon: Samson Cobb MD;  Location: [facility] Main OR;  Service: General     CATARACT EXTRACTION       CATARACT IOL, RT/LT       CHOLECYSTECTOMY  2019     CT BIOPSY LUNG  2019     CT BIOPSY LUNG  10/30/2019     CT BIOPSY LUNG  2021     HYSTERECTOMY  1970     HYSTERECTOMY  age 30     MEDIASTINOSCOPY  2019    lymph node biopsy     MEDIASTINOSCOPY N/A 2019    Procedure: MEDIASTINOSCOPY;  Surgeon: Bernard Rosado MD;  Location: [facility] OR;  Service: Cardiovascular     OOPHORECTOMY  1970     OOPHORECTOMY  age 30     THORACOSCOPIC WEDGE RESECTION LUNG Left 2019    Procedure: Left Video Assisted Thoracoscopic Superior Trisegmentectomy;  Surgeon: Bernard Rosado MD;  Location: [facility]    Family History   Problem Relation Age of Onset     Other - See Comments Father          from stroke during gallbladder surgery      Colon Cancer Mother      Breast Cancer Sister         50s     Cancer Maternal Aunt         type unknown    Social History     Socioeconomic History     Marital status:      Spouse name: Not on file     Number of children: Not on file     Years of education: Not on file     Highest education level: Not on file   Occupational History     Not on file   Tobacco Use     Smoking status: Former Smoker     Types: Cigarettes     Start date:      Quit date: 2007     Years since quittin.0     Smokeless tobacco: Never Used   Substance and Sexual Activity     Alcohol use: Yes     Comment: Alcoholic Drinks/day: occassional     Drug use: Never     Sexual  activity: Not on file   Other Topics Concern     Not on file   Social History Narrative     Not on file     Social Determinants of Health     Financial Resource Strain:      Difficulty of Paying Living Expenses:    Food Insecurity:      Worried About Running Out of Food in the Last Year:      Ran Out of Food in the Last Year:    Transportation Needs:      Lack of Transportation (Medical):      Lack of Transportation (Non-Medical):    Physical Activity:      Days of Exercise per Week:      Minutes of Exercise per Session:    Stress:      Feeling of Stress :    Social Connections:      Frequency of Communication with Friends and Family:      Frequency of Social Gatherings with Friends and Family:      Attends Rastafari Services:      Active Member of Clubs or Organizations:      Attends Club or Organization Meetings:      Marital Status:    Intimate Partner Violence:      Fear of Current or Ex-Partner:      Emotionally Abused:      Physically Abused:      Sexually Abused:           Medications  Allergies   Current Outpatient Medications   Medication Sig Dispense Refill     acetaminophen (TYLENOL) 500 MG tablet Take 1,000 mg by mouth as needed        amLODIPine (NORVASC) 10 MG tablet Take 1 tablet (10 mg) by mouth daily 30 tablet 1     atorvastatin (LIPITOR) 40 MG tablet TAKE 1 TABLET BY MOUTH EVERYDAY AT BEDTIME  1     clopidogrel (PLAVIX) 75 MG tablet Take 75 mg by mouth every morning   1     donepezil (ARICEPT) 10 MG tablet TAKE 1 TABLET BY MOUTH EVERYDAY AT BEDTIME       ferrous sulfate (FEROSUL) 325 (65 Fe) MG tablet daily       fluticasone (FLONASE) 50 MCG/ACT nasal spray Spray 1 spray in nostril every morning        furosemide (LASIX) 20 MG tablet Take 20 mg by mouth every morning        metoprolol tartrate (LOPRESSOR) 25 MG tablet Take 0.5 tablets (12.5 mg) by mouth 2 times daily (Patient taking differently: Take 50 mg by mouth 2 times daily 1.5 tablets twice daily) 60 tablet 3     Nutritional Supplements  (ENSURE COMPLETE PO) Take 1 Can by mouth as needed       enoxaparin (LOVENOX) 40 MG/0.4ML syringe Inject 0.4 mLs (40 mg) Subcutaneous every 24 hours (Patient not taking: Reported on 7/13/2021) 4 Syringe 0     oxyCODONE (ROXICODONE) 5 MG tablet Take 1 tablet (5 mg) by mouth every 4 hours as needed for moderate to severe pain (pain control or improvement in physical function. Hold dose for analgesic side effects.) (Patient not taking: Reported on 7/13/2021) 20 tablet 0     senna-docusate (SENOKOT-S/PERICOLACE) 8.6-50 MG tablet Take 1-2 tablets by mouth 2 times daily (Patient not taking: Reported on 7/13/2021) 40 tablet 0     UNABLE TO FIND Apply 1 g topically as needed MEDICATION NAME: THERAWORKS        Allergies   Allergen Reactions     Rosuvastatin      Simvastatin          Lab Results    Chemistry/lipid CBC Cardiac Enzymes/BNP/TSH/INR   Lab Results   Component Value Date    CHOL 156 11/16/2020    HDL 82 11/16/2020    TRIG 119 11/16/2020    BUN 20 05/06/2021     05/06/2021    CO2 26 05/06/2021    Lab Results   Component Value Date    WBC 5.0 10/15/2020    HGB 13.3 10/15/2020    HCT 41.4 10/15/2020    MCV 94 10/15/2020     10/15/2020    Lab Results   Component Value Date    TROPONINI 0.01 06/04/2019    TSH 2.51 02/06/2018    INR 1.03 10/30/2019        A total of 30 minutes was spent reviewing patient's medical records, obtaining history and performing examination, as well as discussing diagnoses/ recommendations with patient and answering all questions.                          Thank you for allowing me to participate in the care of your patient.      Sincerely,     Sybil Magdaleno MD     [facility]  cc:   No referring provider defined for this encounter.

## 2021-07-13 NOTE — PATIENT INSTRUCTIONS
1.  Decrease metoprolol tartrate to 25 mg or half tablet twice daily  2. Schedule Holter monitor in 1 week with further recommendations to follow.

## 2021-07-13 NOTE — PROGRESS NOTES
Thank you, Dr. Renay Arguello, for asking the [facility] team to see Ms. Gabriela Watkins to evaluate bradycardia.    Assessment/Recommendations   Assessment:    1.  Bradycardia, likely due to high dose beta-blocker therapy in the setting of possibly some underlying conduction disease.  We will check an ECG today but recommended decreasing metoprolol tartrate dosing and obtaining a follow-up Holter monitor.  2.  Paroxysmal atrial fibrillation, suppressed with high-dose beta-blocker.  This was only documented following her lung surgery 2 years ago.  No clinical evidence of recurrence although uncertain how symptomatic she may have been in the past.  We will need to monitor going forward as we cut back beta-blocker therapy.  3.  Lung carcinoma status post recent biopsy for possible recurrence.  Biopsy suggests a well differentiated adenocarcinoma.  Further recommendations per oncology.  4.  Essential hypertension, well controlled    Plan:  1.  Check ECG today  2.  Likely decrease metoprolol tartrate to 25 mg twice daily.  3.  Follow-up Holter in 1 week with further recommendation to follow.       History of Present Illness    Ms. Gabriela Watkins is a 80 year old female with lung carcinoma status post resection complicated by paroxysmal atrial fibrillation, essential hypertension  who presents today for evaluation of bradycardia.  She was recently found to have an enlarged lymph node on the left lung and presented last week for a biopsy where she was noted to have heart rates in the 40s.  They did and she is now here for further recommendation do a biopsy but did not adjust her dose of metoprolol.  Patient does report increased fatigue and lightheadedness but denies any near syncope or true syncope.  Also denies any shortness of breath at rest, orthopnea, PND or lower extremity edema.    ECG (personally reviewed): ECG today demonstrates marked sinus bradycardia rate 46, Axis normal.  T wave  "abnormality in the anterior leads possibly suggestive of ischemia.    Cardiac Imaging Studies (personally reviewed): No recent cardiac imaging     Physical Examination Review of Systems   /70 (BP Location: Left arm, Patient Position: Sitting, Cuff Size: Adult Large)   Pulse 56   Resp 12   Ht 1.499 m (4' 11\")   Wt 67.7 kg (149 lb 4.8 oz)   BMI 30.15 kg/m    Body mass index is 30.15 kg/m .  Wt Readings from Last 3 Encounters:   07/13/21 67.7 kg (149 lb 4.8 oz)   06/18/21 70.3 kg (155 lb)   02/18/21 72.9 kg (160 lb 12.8 oz)     General Appearance:   Awake, Alert, No acute distress.   HEENT:  No scleral icterus; the mucous membranes were pink and moist.   Neck: No cervical bruits or jugular venous distention    Chest: The spine was straight. The chest was symmetric.   Lungs:   Respirations unlabored; the lungs are clear to auscultation. No wheezing   Cardiovascular:    Regular rhythm which is bradycardic.  S1, S2 normal.  No murmur or gallop   Abdomen:  No organomegaly, masses, bruits, or tenderness. Bowels sounds are present   Extremities:  No peripheral edema   Skin: No xanthelasma. Warm, Dry.   Musculoskeletal: No tenderness.   Neurologic: Mood and affect are appropriate.    Enc Vitals  BP: 106/70  Pulse: 56  Resp: 12  Weight: 67.7 kg (149 lb 4.8 oz)  Height: 149.9 cm (4' 11\")                                         Medical History  Surgical History Family History Social History   Past Medical History:   Diagnosis Date     Acute gangrenous cholecystitis      Arthritis      Cancer (H)      Cerebral aneurysm     11mm     CVA (cerebral vascular accident) (H)      Dyslipidemia      HTN (hypertension)      Hypertension      Lung nodule      Lung nodules      Malignant neoplasm of upper lobe of left lung (H)      Osteoarthritis      Primary adenocarcinoma of upper lobe of left lung (H)      Short-term memory loss      Stroke (H)     Past Surgical History:   Procedure Laterality Date     BLADDER SURGERY  1970     " BLADDER SURGERY       C LAP,CHOLECYSTECTOMY/EXPLORE N/A 6/3/2019    Procedure: CHOLECYSTECTOMY, LAPAROSCOPIC;  Surgeon: Samson Cobb MD;  Location: [facility] Main OR;  Service: General     CATARACT EXTRACTION       CATARACT IOL, RT/LT       CHOLECYSTECTOMY  2019     CT BIOPSY LUNG  2019     CT BIOPSY LUNG  10/30/2019     CT BIOPSY LUNG  2021     HYSTERECTOMY  1970     HYSTERECTOMY  age 30     MEDIASTINOSCOPY  2019    lymph node biopsy     MEDIASTINOSCOPY N/A 2019    Procedure: MEDIASTINOSCOPY;  Surgeon: Bernard Rosado MD;  Location: [facility] Main OR;  Service: Cardiovascular     OOPHORECTOMY  1970     OOPHORECTOMY  age 30     THORACOSCOPIC WEDGE RESECTION LUNG Left 2019    Procedure: Left Video Assisted Thoracoscopic Superior Trisegmentectomy;  Surgeon: Bernard Rosado MD;  Location: [facility]    Family History   Problem Relation Age of Onset     Other - See Comments Father          from stroke during gallbladder surgery      Colon Cancer Mother      Breast Cancer Sister         50s     Cancer Maternal Aunt         type unknown    Social History     Socioeconomic History     Marital status:      Spouse name: Not on file     Number of children: Not on file     Years of education: Not on file     Highest education level: Not on file   Occupational History     Not on file   Tobacco Use     Smoking status: Former Smoker     Types: Cigarettes     Start date:      Quit date: 2007     Years since quittin.0     Smokeless tobacco: Never Used   Substance and Sexual Activity     Alcohol use: Yes     Comment: Alcoholic Drinks/day: occassional     Drug use: Never     Sexual activity: Not on file   Other Topics Concern     Not on file   Social History Narrative     Not on file     Social Determinants of Health     Financial Resource Strain:      Difficulty of Paying Living Expenses:    Food Insecurity:      Worried About Running Out of Food in the Last  Year:      Ran Out of Food in the Last Year:    Transportation Needs:      Lack of Transportation (Medical):      Lack of Transportation (Non-Medical):    Physical Activity:      Days of Exercise per Week:      Minutes of Exercise per Session:    Stress:      Feeling of Stress :    Social Connections:      Frequency of Communication with Friends and Family:      Frequency of Social Gatherings with Friends and Family:      Attends [employer] Services:      Active Member of Clubs or Organizations:      Attends Club or Organization Meetings:      Marital Status:    Intimate Partner Violence:      Fear of Current or Ex-Partner:      Emotionally Abused:      Physically Abused:      Sexually Abused:           Medications  Allergies   Current Outpatient Medications   Medication Sig Dispense Refill     acetaminophen (TYLENOL) 500 MG tablet Take 1,000 mg by mouth as needed        amLODIPine (NORVASC) 10 MG tablet Take 1 tablet (10 mg) by mouth daily 30 tablet 1     atorvastatin (LIPITOR) 40 MG tablet TAKE 1 TABLET BY MOUTH EVERYDAY AT BEDTIME  1     clopidogrel (PLAVIX) 75 MG tablet Take 75 mg by mouth every morning   1     donepezil (ARICEPT) 10 MG tablet TAKE 1 TABLET BY MOUTH EVERYDAY AT BEDTIME       ferrous sulfate (FEROSUL) 325 (65 Fe) MG tablet daily       fluticasone (FLONASE) 50 MCG/ACT nasal spray Spray 1 spray in nostril every morning        furosemide (LASIX) 20 MG tablet Take 20 mg by mouth every morning        metoprolol tartrate (LOPRESSOR) 25 MG tablet Take 0.5 tablets (12.5 mg) by mouth 2 times daily (Patient taking differently: Take 50 mg by mouth 2 times daily 1.5 tablets twice daily) 60 tablet 3     Nutritional Supplements (ENSURE COMPLETE PO) Take 1 Can by mouth as needed       enoxaparin (LOVENOX) 40 MG/0.4ML syringe Inject 0.4 mLs (40 mg) Subcutaneous every 24 hours (Patient not taking: Reported on 7/13/2021) 4 Syringe 0     oxyCODONE (ROXICODONE) 5 MG tablet Take 1 tablet (5 mg) by mouth every 4 hours  as needed for moderate to severe pain (pain control or improvement in physical function. Hold dose for analgesic side effects.) (Patient not taking: Reported on 7/13/2021) 20 tablet 0     senna-docusate (SENOKOT-S/PERICOLACE) 8.6-50 MG tablet Take 1-2 tablets by mouth 2 times daily (Patient not taking: Reported on 7/13/2021) 40 tablet 0     UNABLE TO FIND Apply 1 g topically as needed MEDICATION NAME: THERAWORKS        Allergies   Allergen Reactions     Rosuvastatin      Simvastatin          Lab Results    Chemistry/lipid CBC Cardiac Enzymes/BNP/TSH/INR   Lab Results   Component Value Date    CHOL 156 11/16/2020    HDL 82 11/16/2020    TRIG 119 11/16/2020    BUN 20 05/06/2021     05/06/2021    CO2 26 05/06/2021    Lab Results   Component Value Date    WBC 5.0 10/15/2020    HGB 13.3 10/15/2020    HCT 41.4 10/15/2020    MCV 94 10/15/2020     10/15/2020    Lab Results   Component Value Date    TROPONINI 0.01 06/04/2019    TSH 2.51 02/06/2018    INR 1.03 10/30/2019        A total of 30 minutes was spent reviewing patient's medical records, obtaining history and performing examination, as well as discussing diagnoses/ recommendations with patient and answering all questions.

## 2021-07-16 NOTE — LETTER
"    7/16/2021         RE: Gabriela Watkins  [address] [place] [address]        Dear Colleague,    Thank you for referring your patient, Gabriela Watkins, to the [facility]. Please see a copy of my visit note below.    ..  Oncology Rooming Note      07/16/21 9:10 AM       Gabriela Watkins is a 80 year old female who presents for:      Chief Complaint   Patient presents with     Oncology Clinic Visit     Unstaged         Initial Vitals: /62 (BP Location: Left arm, Patient Position: Sitting, Cuff Size: Adult Regular)   Pulse 75   Temp 97.9  F (36.6  C) (Oral)   Ht 1.499 m (4' 11\")   Wt 67.1 kg (147 lb 14.4 oz)   SpO2 95%   BMI 29.87 kg/m   Estimated body mass index is 29.87 kg/m  as calculated from the following:    Height as of this encounter: 1.499 m (4' 11\").    Weight as of this encounter: 67.1 kg (147 lb 14.4 oz). Body surface area is 1.67 meters squared.      Moderate Pain (4) Comment: Data Unavailable       No LMP recorded. Patient has had a hysterectomy.      Allergies reviewed: Yes  Medications reviewed: Yes      Medications: Medication refills not needed today.  Pharmacy name entered into Medication Review: [id number] [place] [address]      Clinical concerns:  concerns- results of scan.    Distress screen brought up daughter's concern about patient having very little appetite recently. Eating one small meal a day, and not drinking much water.  Also, brought up emotions for patient and her daughter describing depression, anxiety, and desire for resources. See notes on flowsheet.                          [facility]    Patient: Gabriela Watkins  MRN: [id number]  Date of Service: 06/18/2021        Reason for Visit    Chief Complaint   Patient presents with     HE Cancer     Primary lung adenocarcinoma       Assessment and Plan    T3 N0 M0, stage IIb adenocarcinoma of the left lung status post " trisegmentectomy on August 30, 2019  Tumor is PDL 1 negative; K-wilberto mutated, no other alterations noted    Right upper lobe lung nodule, biopsy shows small cell lung cancer diagnosis in October 2019    Left lower lobe adenocarcinoma, diagnosed July 2021, (third diagnosis of lung cancer)      History of smoking  Mild microcytic anemia  Distress    Pathology from biopsy shows well-differentiated adenocarcinoma with a partially lipidic growth pattern and mucinous differentiation.    Explained to patient and daughter that this is a non-small cell lung cancer.  Best option for treatment would be stereotactic radiation.  Will refer back to Dr. Beasley.    We will plan to see patient back again in 4 months with repeat CT scans.    Distress related to diagnoses of cancer.  Will refer to Letty Serrano our psychotherapist.    Plan: As above      Measurable disease: CT of the chest    Current therapy: Observation      Treatment history:    5000 cGy in 5 treatments between March 23 and April 1, 2020      Carbo etoposide for 4 cycles last starting February 3, 2020   carboplatin and etoposide, today is day 1 cycle 3  Cycle 3 to be administered with 50% of full dose  First 2 cycles administered with 75% of full dose            Cancer Staging  Primary lung adenocarcinoma, left (H)  Staging form: Lung, AJCC 8th Edition  - Clinical stage from 10/15/2019: Stage IIB (cT3, cN0, cM0) - Signed by Quintin Niño MD on 10/15/2019      ECOG Performance        Distress Assessment  Distress Assessment Score: No distress    Pain         Problem List    1. Nodule of lower lobe of left lung  CT Lung Biopsy    CT Fiducial Placement Lung   2. Primary lung adenocarcinoma, left (H)     3. Small cell lung cancer, right lower lobe (H)          CC: Kirsty Rainey PA-C    ______________________________________________________________________________    History of Present Illness    Ms. Gabriela Watkins returns for reevaluation.  She had  biopsy and fiducial placement.  Some distress due to emotional issues.  Would like to see the psychotherapist.    Tolerated biopsy procedure fine.  Reviewed pathology showing well-differentiated adenocarcinoma.      Review of Systems    As per the HPI.       Patient Coping     Distress Assessment  Distress Assessment Score: No distress  Accompanied by  Accompanied by: Family Member    Past History  Past Medical History:   Diagnosis Date     Acute gangrenous cholecystitis      Arthritis      Cancer (H)      Cerebral aneurysm, unruptured     11mm     Dyslipidemia      Hypertension      Lung nodules      Osteoarthritis      Primary adenocarcinoma of upper lobe of left lung      Short-term memory loss      Stroke (H)          Past Surgical History:   Procedure Laterality Date     BLADDER SURGERY       CATARACT EXTRACTION       CT BIOPSY LUNG  7/8/2019     CT BIOPSY LUNG  10/30/2019     HYSTERECTOMY  age 30     MEDIASTINOSCOPY N/A 8/8/2019    Procedure: MEDIASTINOSCOPY;  Surgeon: Bernard Rosado MD;  Location: [facility];  Service: Cardiovascular     OOPHORECTOMY  age 30     [place] LAP,CHOLECYSTECTOMY N/A 6/3/2019    Procedure: CHOLECYSTECTOMY, LAPAROSCOPIC;  Surgeon: Samson Cobb MD;  Location: [facility] OR;  Service: General       Physical Exam    Recent Vitals 6/18/2021   Weight 155 lbs   BSA (m2) 1.71 m2   /57   Pulse 46   Temp 99.4   Temp src 1   SpO2 96   Some recent data might be hidden       GENERAL: Alert and oriented to time place and person. Seated comfortably. In no distress.    HEAD: Atraumatic and normocephalic.    EYES: RICKI, EOMI.  No pallor.  No icterus.    Oral cavity: no mucosal lesion or tonsillar enlargement.    NECK: supple. JVP normal.  No thyroid enlargement.    LYMPH NODES: No palpable, cervical, axillary or inguinal lymphadenopathy.    CHEST: clear to auscultation bilaterally.  Resonant to percussion throughout bilaterally.  Symmetrical breath movements  bilaterally.    CVS: S1 and S2 are heard. Regular rate and rhythm.  No murmur or gallop or rub heard.  No peripheral edema.    ABDOMEN: Soft. Not tender. Not distended.  No palpable hepatomegaly or splenomegaly.  No other mass palpable.  Bowel sounds heard.    EXTREMITIES: Warm.    SKIN: no rash, or bruising or purpura.  Has a full head of hair.      Lab Results    Recent Results (from the past 168 hour(s))   Comprehensive Metabolic Panel   Result Value Ref Range    Sodium 144 136 - 145 mmol/L    Potassium 3.4 (L) 3.5 - 5.0 mmol/L    Chloride 107 98 - 107 mmol/L    CO2 29 22 - 31 mmol/L    Anion Gap, Calculation 8 5 - 18 mmol/L    Glucose 134 (H) 70 - 125 mg/dL    BUN 24 8 - 28 mg/dL    Creatinine 1.17 (H) 0.60 - 1.10 mg/dL    GFR MDRD Af Amer 54 (L) >60 mL/min/1.73m2    GFR MDRD Non Af Amer 44 (L) >60 mL/min/1.73m2    Bilirubin, Total 0.8 0.0 - 1.0 mg/dL    Calcium 9.1 8.5 - 10.5 mg/dL    Protein, Total 6.7 6.0 - 8.0 g/dL    Albumin 3.7 3.5 - 5.0 g/dL    Alkaline Phosphatase 95 45 - 120 U/L    AST 11 0 - 40 U/L    ALT 11 0 - 45 U/L   HM1 (CBC with Diff)   Result Value Ref Range    WBC 7.0 4.0 - 11.0 thou/uL    RBC 4.19 3.80 - 5.40 mill/uL    Hemoglobin 13.4 12.0 - 16.0 g/dL    Hematocrit 40.9 35.0 - 47.0 %    MCV 98 80 - 100 fL    MCH 32.0 27.0 - 34.0 pg    MCHC 32.8 32.0 - 36.0 g/dL    RDW 13.0 11.0 - 14.5 %    Platelets 175 140 - 440 thou/uL    MPV 9.5 8.5 - 12.5 fL    Neutrophils % 66 50 - 70 %    Lymphocytes % 23 20 - 40 %    Monocytes % 9 2 - 10 %    Eosinophils % 1 0 - 6 %    Basophils % 0 0 - 2 %    Immature Granulocyte % 0 <=0 %    Neutrophils Absolute 4.6 2.0 - 7.7 thou/uL    Lymphocytes Absolute 1.6 0.8 - 4.4 thou/uL    Monocytes Absolute 0.7 0.0 - 0.9 thou/uL    Eosinophils Absolute 0.1 0.0 - 0.4 thou/uL    Basophils Absolute 0.0 0.0 - 0.2 thou/uL    Immature Granulocyte Absolute 0.0 <=0.0 thou/uL       Imaging    Ct Chest Without Contrast    Result Date: 6/14/2021  EXAM: CT CHEST WO CONTRAST LOCATION: [facility] DATE/TIME: 6/14/2021 3:58 PM INDICATION: fu lung cancer COMPARISON: CT of the chest 2/16/2021, 10/12/2020 and 6/8/2020; PET/CT 3/4/2020 TECHNIQUE: CT chest without IV contrast. Multiplanar reformats were obtained. Dose reduction techniques were used. CONTRAST: None. FINDINGS: LUNGS AND PLEURA: Status post subtotal left upper lobectomy. No new soft tissue thickening along the margin of bronchial resection. Focus of groundglass attenuation in the superior segment left lower lobe is unchanged measuring 8 mm long axis (series 5, image 76). A solid nodule in the medial basal left lower lobe measures 8 x 10 mm (series 5, image 196) which was present in retrospect measuring 4 x 5 mm 10/12/2020. Focal band of opacity around the fiducial marker in the right upper lobe extends from the right hilum to the lateral right apex consistent with cicatrization atelectasis, unchanged. No new right lung nodules. Symmetric subpleural interstitial thickening/fibrosis is present in both lower lobes along the diaphragmatic pleura. MEDIASTINUM: Cardiac chambers are normal in size. No pericardial effusion. Degenerative calcification of the aortic root and thickening/calcification of aortic valve leaflets. Moderate diffuse atheromatous calcification of the normal caliber thoracic aorta. No new enlarged mediastinal or hilar lymph nodes. Esophagus is decompressed. CORONARY ARTERY CALCIFICATION: Moderate. UPPER ABDOMEN: Multiple cysts arise from the upper poles of both kidneys which do not require specific workup or follow-up. Prior cholecystectomy. MUSCULOSKELETAL: Diffuse thoracic spondylosis characterized by loss of disc space height, discogenic sclerosis and marginal osteophytes. No spondylolisthesis. No aggressive or destructive bone lesions.     1.  Status post left upper lobectomy. No findings to suggest local recurrence at the margin of resection. 2.  Metallic fiducial in the right upper lobe with  surrounding radiation fibrosis. No findings to suggest local tumor recurrence in the right upper lobe. 3.  Definite growth of a solid nodule in the medial left lower lobe highly suspect for malignancy. Characterization with PET/CT and/or biopsy is suggested.         Signed by: Quintin Niño MD        Again, thank you for allowing me to participate in the care of your patient.        Sincerely,        Quintin Niño MD

## 2021-07-16 NOTE — PROGRESS NOTES
"..  Oncology Rooming Note      07/16/21 9:10 AM       Gabriela Watkins is a 80 year old female who presents for:      Chief Complaint   Patient presents with     Oncology Clinic Visit     Unstaged         Initial Vitals: /62 (BP Location: Left arm, Patient Position: Sitting, Cuff Size: Adult Regular)   Pulse 75   Temp 97.9  F (36.6  C) (Oral)   Ht 1.499 m (4' 11\")   Wt 67.1 kg (147 lb 14.4 oz)   SpO2 95%   BMI 29.87 kg/m   Estimated body mass index is 29.87 kg/m  as calculated from the following:    Height as of this encounter: 1.499 m (4' 11\").    Weight as of this encounter: 67.1 kg (147 lb 14.4 oz). Body surface area is 1.67 meters squared.      Moderate Pain (4) Comment: Data Unavailable       No LMP recorded. Patient has had a hysterectomy.      Allergies reviewed: Yes  Medications reviewed: Yes      Medications: Medication refills not needed today.  Pharmacy name entered into [facility]: [id number] [place] [address]      Clinical concerns:  concerns- results of scan.    Distress screen brought up daughter's concern about patient having very little appetite recently. Eating one small meal a day, and not drinking much water.  Also, brought up emotions for patient and her daughter describing depression, anxiety, and desire for resources. See notes on flowsheet.                        "

## 2021-07-16 NOTE — PROGRESS NOTES
[facility] Hematology and Oncology Progress Note    Patient: Gabriela Watkins  MRN: [id number]  Date of Service: 06/18/2021        Reason for Visit    Chief Complaint   Patient presents with     HE Cancer     Primary lung adenocarcinoma       Assessment and Plan    T3 N0 M0, stage IIb adenocarcinoma of the left lung status post trisegmentectomy on August 30, 2019  Tumor is PDL 1 negative; K-wilberto mutated, no other alterations noted    Right upper lobe lung nodule, biopsy shows small cell lung cancer diagnosis in October 2019    Left lower lobe adenocarcinoma, diagnosed July 2021, (third diagnosis of lung cancer)      History of smoking  Mild microcytic anemia  Distress    Pathology from biopsy shows well-differentiated adenocarcinoma with a partially lipidic growth pattern and mucinous differentiation.    Explained to patient and daughter that this is a non-small cell lung cancer.  Best option for treatment would be stereotactic radiation.  Will refer back to Dr. Beasley.    We will plan to see patient back again in 4 months with repeat CT scans.    Distress related to diagnoses of cancer.  Will refer to Letty Serrano our psychotherapist.    Plan: As above      Measurable disease: CT of the chest    Current therapy: Observation      Treatment history:    5000 cGy in 5 treatments between March 23 and April 1, 2020      Carbo etoposide for 4 cycles last starting February 3, 2020   carboplatin and etoposide, today is day 1 cycle 3  Cycle 3 to be administered with 50% of full dose  First 2 cycles administered with 75% of full dose            Cancer Staging  Primary lung adenocarcinoma, left (H)  Staging form: Lung, AJCC 8th Edition  - Clinical stage from 10/15/2019: Stage IIB (cT3, cN0, cM0) - Signed by Quintin Niño MD on 10/15/2019      ECOG Performance        Distress Assessment  Distress Assessment Score: No distress    Pain         Problem List    1. Nodule of lower lobe of left lung  CT Lung Biopsy    CT  Fiducial Placement Lung   2. Primary lung adenocarcinoma, left (H)     3. Small cell lung cancer, right lower lobe (H)          CC: Kirsty Rainey PA-C    ______________________________________________________________________________    History of Present Illness    Ms. Gabriela Watkins returns for reevaluation.  She had biopsy and fiducial placement.  Some distress due to emotional issues.  Would like to see the psychotherapist.    Tolerated biopsy procedure fine.  Reviewed pathology showing well-differentiated adenocarcinoma.      Review of Systems    As per the HPI.       Patient Coping     Distress Assessment  Distress Assessment Score: No distress  Accompanied by  Accompanied by: Family Member    Past History  Past Medical History:   Diagnosis Date     Acute gangrenous cholecystitis      Arthritis      Cancer (H)      Cerebral aneurysm, unruptured     11mm     Dyslipidemia      Hypertension      Lung nodules      Osteoarthritis      Primary adenocarcinoma of upper lobe of left lung      Short-term memory loss      Stroke (H)          Past Surgical History:   Procedure Laterality Date     BLADDER SURGERY       CATARACT EXTRACTION       CT BIOPSY LUNG  7/8/2019     CT BIOPSY LUNG  10/30/2019     HYSTERECTOMY  age 30     MEDIASTINOSCOPY N/A 8/8/2019    Procedure: MEDIASTINOSCOPY;  Surgeon: Bernard Rosado MD;  Location: [facility];  Service: Cardiovascular     OOPHORECTOMY  age 30     CO LAP,CHOLECYSTECTOMY N/A 6/3/2019    Procedure: CHOLECYSTECTOMY, LAPAROSCOPIC;  Surgeon: Samson Cobb MD;  Location: [facility] OR;  Service: General       Physical Exam    Recent Vitals 6/18/2021   Weight 155 lbs   BSA (m2) 1.71 m2   /57   Pulse 46   Temp 99.4   Temp src 1   SpO2 96   Some recent data might be hidden       GENERAL: Alert and oriented to time place and person. Seated comfortably. In no distress.    HEAD: Atraumatic and normocephalic.    EYES: RICKI, EOMI.  No pallor.  No  icterus.    Oral cavity: no mucosal lesion or tonsillar enlargement.    NECK: supple. JVP normal.  No thyroid enlargement.    LYMPH NODES: No palpable, cervical, axillary or inguinal lymphadenopathy.    CHEST: clear to auscultation bilaterally.  Resonant to percussion throughout bilaterally.  Symmetrical breath movements bilaterally.    CVS: S1 and S2 are heard. Regular rate and rhythm.  No murmur or gallop or rub heard.  No peripheral edema.    ABDOMEN: Soft. Not tender. Not distended.  No palpable hepatomegaly or splenomegaly.  No other mass palpable.  Bowel sounds heard.    EXTREMITIES: Warm.    SKIN: no rash, or bruising or purpura.  Has a full head of hair.      Lab Results    Recent Results (from the past 168 hour(s))   Comprehensive Metabolic Panel   Result Value Ref Range    Sodium 144 136 - 145 mmol/L    Potassium 3.4 (L) 3.5 - 5.0 mmol/L    Chloride 107 98 - 107 mmol/L    CO2 29 22 - 31 mmol/L    Anion Gap, Calculation 8 5 - 18 mmol/L    Glucose 134 (H) 70 - 125 mg/dL    BUN 24 8 - 28 mg/dL    Creatinine 1.17 (H) 0.60 - 1.10 mg/dL    GFR MDRD Af Amer 54 (L) >60 mL/min/1.73m2    GFR MDRD Non Af Amer 44 (L) >60 mL/min/1.73m2    Bilirubin, Total 0.8 0.0 - 1.0 mg/dL    Calcium 9.1 8.5 - 10.5 mg/dL    Protein, Total 6.7 6.0 - 8.0 g/dL    Albumin 3.7 3.5 - 5.0 g/dL    Alkaline Phosphatase 95 45 - 120 U/L    AST 11 0 - 40 U/L    ALT 11 0 - 45 U/L   HM1 (CBC with Diff)   Result Value Ref Range    WBC 7.0 4.0 - 11.0 thou/uL    RBC 4.19 3.80 - 5.40 mill/uL    Hemoglobin 13.4 12.0 - 16.0 g/dL    Hematocrit 40.9 35.0 - 47.0 %    MCV 98 80 - 100 fL    MCH 32.0 27.0 - 34.0 pg    MCHC 32.8 32.0 - 36.0 g/dL    RDW 13.0 11.0 - 14.5 %    Platelets 175 140 - 440 thou/uL    MPV 9.5 8.5 - 12.5 fL    Neutrophils % 66 50 - 70 %    Lymphocytes % 23 20 - 40 %    Monocytes % 9 2 - 10 %    Eosinophils % 1 0 - 6 %    Basophils % 0 0 - 2 %    Immature Granulocyte % 0 <=0 %    Neutrophils Absolute 4.6 2.0 - 7.7 thou/uL    Lymphocytes  Absolute 1.6 0.8 - 4.4 thou/uL    Monocytes Absolute 0.7 0.0 - 0.9 thou/uL    Eosinophils Absolute 0.1 0.0 - 0.4 thou/uL    Basophils Absolute 0.0 0.0 - 0.2 thou/uL    Immature Granulocyte Absolute 0.0 <=0.0 thou/uL       Imaging    Ct Chest Without Contrast    Result Date: 6/14/2021  EXAM: CT CHEST WO CONTRAST LOCATION: [facility] DATE/TIME: 6/14/2021 3:58 PM INDICATION: fu lung cancer COMPARISON: CT of the chest 2/16/2021, 10/12/2020 and 6/8/2020; PET/CT 3/4/2020 TECHNIQUE: CT chest without IV contrast. Multiplanar reformats were obtained. Dose reduction techniques were used. CONTRAST: None. FINDINGS: LUNGS AND PLEURA: Status post subtotal left upper lobectomy. No new soft tissue thickening along the margin of bronchial resection. Focus of groundglass attenuation in the superior segment left lower lobe is unchanged measuring 8 mm long axis (series 5, image 76). A solid nodule in the medial basal left lower lobe measures 8 x 10 mm (series 5, image 196) which was present in retrospect measuring 4 x 5 mm 10/12/2020. Focal band of opacity around the fiducial marker in the right upper lobe extends from the right hilum to the lateral right apex consistent with cicatrization atelectasis, unchanged. No new right lung nodules. Symmetric subpleural interstitial thickening/fibrosis is present in both lower lobes along the diaphragmatic pleura. MEDIASTINUM: Cardiac chambers are normal in size. No pericardial effusion. Degenerative calcification of the aortic root and thickening/calcification of aortic valve leaflets. Moderate diffuse atheromatous calcification of the normal caliber thoracic aorta. No new enlarged mediastinal or hilar lymph nodes. Esophagus is decompressed. CORONARY ARTERY CALCIFICATION: Moderate. UPPER ABDOMEN: Multiple cysts arise from the upper poles of both kidneys which do not require specific workup or follow-up. Prior cholecystectomy. MUSCULOSKELETAL: Diffuse thoracic  spondylosis characterized by loss of disc space height, discogenic sclerosis and marginal osteophytes. No spondylolisthesis. No aggressive or destructive bone lesions.     1.  Status post left upper lobectomy. No findings to suggest local recurrence at the margin of resection. 2.  Metallic fiducial in the right upper lobe with surrounding radiation fibrosis. No findings to suggest local tumor recurrence in the right upper lobe. 3.  Definite growth of a solid nodule in the medial left lower lobe highly suspect for malignancy. Characterization with PET/CT and/or biopsy is suggested.         Signed by: Quintin Niño MD

## 2021-07-19 NOTE — PROGRESS NOTES
"Oncology Distress Screening Follow-up  Clinical Social Work  [facility]    Identified Concern and Score From Distress Screening:   3. How concerned are you about feeling depressed or very sad?   9Abnormal   Sometims doesn't feel like getting out of bed. Doesn't want to live anymore somedays.           4. How concerned are you about feeling anxious or very scared?   7Abnormal   Sometimes afraid to sleep due to worry about not waking up.             7. How concerned are you about knowing what resources are available to help you?   9Abnormal   Pt and daughter would like more info on resources     9. If you want to be contacted by one of our professionals, I can send a message to them right now.   Oncology Social  Worker;[facility] Psychologist       Date of Distress Screenin21    Data: Sandra is a 80 year old woman diagnosed with lung cancer. Sandra had a provider visit on  with Dr. Niño and expressed concern re: feeling sad/depressed, anxious/worried, and was hoping to gain more info on resources. Sandra also requested a call from both the SW and Psychologist.     Intervention: SW attempted to reach out to Sandra today and was informed by her daughter, Mary Kay, that she was resting. Mary Kay states that her situation is \"not the best\" and requested SW call back in 30 min. SW will reach back out to assess for support/resource needs.     ADDENDUM  I: CYNDEE reached out to Sandra and Mary Kay today via phone. Mary Kay shares that Sandra lives with her in a mobile home and has lived with her approximately 20 years. Mary Kay is Sandra's primary caregiver. Sandra also has adult sons who \"help when needed\".     Mary Kay feels Sandra is getting very frustrated due to the changes her body is going through. She feels her hearing loss is declining along with her vision. SHe also shares that Sandra is weaker and stays in bed often, resulting in her body being sore. Mary Kay does not feel Sandra eats a lot despite encouragement. Mary Kay also feels she doesn't drink " "enough water.       Mary Kay feels the impact of these changes is making Sandra \"frustrated and angry\". CYNDEE validates this and discusses that the loss of ones independence can be very frustrating especially if her mind and body are not working together. Mary Kay agrees and feels that her mother makes statements such as \"I just wish I could end it\" because she is frustrated. SW discussed that maybe her mother feels it would be \"easier\" to \"end it\" than to keep struggling with the decline in her health. Mary Kay agrees. CYNDEE identifies how this can be very hard as a daughter and as a caregiver to hear her mother talk about this. Mary Kay agrees.  A referral has been placed for Letty Serrano LP, [facility], to check in and offer ongoing therapeutic support.      Mary Kay feels financially they are doing well. Sandra is currently in the process of paying off a $1,000 hospital bill and Mary Kay feels it will be paid on in just a few months. Mary Kay denies any fnncial needs at this time.    Mary Kay feels that Sandra is doing \"okay\" nutritionally. Food support is not an issue but rather her lack of desire to eat. Mary Kay shares that even if she prepares food Sandra does not always eat. CYNDEE discussed the possibility that her taste may have changed and what she used to like is no long satisfying. Mary Kay feels this is a possibility. Mary Kay was agreeable to have SW place a RD consult. CYNDEE also discussed [employer] with Mary Kay. Mary Kay states her mother received [employer] meals while she was doing chemo, but did not enjoy the meals much. CYNDEE discussed other programs such as Meals on Wheels. At this time Mary Kay declines a referral to be placed but will update CYNDEE if anything changes.     Mary Kay is Sandra's Main form of transportation to medical appointments. Mary Kay is able to drive her and attend her appointments with her, which she finds helpful. She feels sometimes her mother is not always truthful about how things are going, so this gives her the ability to discuss her concerns with the provider. " Mary Kay denies any transportation needs at this time.     SW provided SW contact information via [employer] as requested by Mary Kay. Mary Kay will plan to reach out if any additional support/resource needs arise.       Education Provided: Oncology Clinic SW role    Follow-up Required: SW will remain available for ongoing resource/support needs. Mary Kay or Sandra will plan to reach out as needed.       DOMI Garcia, LGSW  [facility]  Adult Oncology Clinic  Phone: [phone]

## 2021-07-20 NOTE — PROGRESS NOTES
Called patient and spoke with daughter, Mary Kay regarding Sandra's recent oral intake. Mary Kay notes that her mom has been eating less and needs a lot of encouragement to eat. She is drinking Ensure High Protein (160 calories and 16 g protein) about 1 time/ day.    Encouraged Mary Kay to try a higher calorie supplement (such as Ensure Plus). Also emailed [employer] to see if insurance will cover supplements. Encouraged high calorie/ high protein foods and written material emailed to Mary Kay with tips and recipes.    Haleigh Cooper, MS, RD, LD

## 2021-07-26 NOTE — ED PROVIDER NOTES
EMERGENCY DEPARTMENT ENCOUNTER      NAME: Gabriela Watkins  AGE: 81 year old female  YOB: 1940  MRN: [id number]  EVALUATION DATE & TIME: 7/26/2021  4:59 PM    PCP: Renay Arguello    ED PROVIDER: Elida Basurto M.D.      Chief Complaint   Patient presents with     Generalized Weakness     FINAL IMPRESSION:  1. Intracranial mass    2. Generalized muscle weakness    3. Poor appetite    4. Nausea      ED COURSE & MEDICAL DECISION MAKING:    Pertinent Labs & Imaging studies reviewed. (See chart for details)  5:13 PM I met with the patient to gather history and perform an initial exam. PPE: gloves, N95 mask  6:40 PM I spoke to radiologist.   7:30 PM I rechecked on the patient and updated them on results. Discussed plan.     ED Course as of Jul 26 2326   Mon Jul 26, 2021   1800 Patient is a pleasant 81-year-old female who comes in today just not feeling well.  She is has some generalized weakness.  She has had very poor appetite and had some nausea.  She had some vomiting today.  She not had any headaches.  She just does not feel well.  Her daughter thinks she is quite depressed.  They just started on antidepressants in the last week.  The symptoms have been going on for several weeks.  She was recently diagnosed with a new lung cancer that is going to be treated with radiation.  She is on blood thinners.  We will check some basic blood work and give her some fluids and some Tylenol and then plan to do CT scan of her head.  I just 1 to make sure that there is not a subdural or something that is causing her symptoms.  Her and family are in agreement with the plan.      1843 I spoke with the radiologist.  He reports a large mass in the brain.  I added on an MRI with and without contrast with perfusion to further evaluate this mass in the brain but it certainly could explain the patient's symptoms today.      1920 I updated the patient and family on the plan for MRI.  I told him that we may have found a  cause of her symptoms and will take it 1 step at a time while we try to figure out what is going on.  They were in agreement.      2208 MRI shows a large mass that is either metastatic or a glial tumor.  I have a call out to neurosurgery.  We will talk to them and figure out a plan for the patient.      2222 I spoke with our neurosurgery NP here.  This is not something that could stay at [facility] and it sounds like the patient is symptomatic so she recommended transfer to the [place] or to [facility] for neurosurgical evaluation and further management.  I will see where would be the most appropriate place to send her.      2247 I went spoke to the patient and family and updated them on what we found and what can happen next.  She needs to go somewhere that has capital neurosurgery which we do not have here.  We attempted to contact [facility] and the [facility] and they said they could not take the patient.  We are now going to try [facility] or [place] to see if they have the capability to help us with this patient.      2309 If regions do not he can take the patient then we will again attempt to try to find somewhere within our system that has neurosurgery capabilities even though they are boarding.      2326 Patient is signed out to Dr. Rueda at change of shift looking for a bed.        At the conclusion of the encounter I discussed  the results of all of the tests and the disposition with patient.   All questions were answered.  The patient acknowledged understanding and was involved in the decision making regarding the overall care plan.     45 minutes of critical care time     MEDICATIONS GIVEN IN THE EMERGENCY:  Medications   0.9% sodium chloride BOLUS (0 mLs Intravenous Stopped 7/26/21 1859)   acetaminophen (TYLENOL) tablet 975 mg (975 mg Oral Given 7/26/21 1809)   LORazepam (ATIVAN) injection 0.5 mg (0.5 mg Intravenous Given 7/26/21 2021)   gadobutrol (GADAVIST) injection 6 mL (6 mLs  Intravenous Given 7/26/21 2113)     =================================================================    [facility]    Triage Note: Patient arrived via [facility] for evaluation of generalized weakness that started 2 months ago and continues. Per EMS, patient has no appeitite and has not been eating and drinking over the last 24 hours and very little urine output. EMS report stable VS. History of small cell lung cancer.       Patient information was obtained from: patient     Use of : N/A        Gabriela Watkins is a 81 year old female who presents to the ED for evaluation of weakness and decreased appetite.     The patient's appetite has been decreased for about 6 weeks, but she has not eaten and drank anything within the last 48 hours. Last night, the patient felt warm and anxious after attending her birthday party. This developed into diarrhea, nausea, and gagging, but did not have any emesis. Her daughter thinks she was overwhelmed by the party which led to her symptoms last night. She has been particularly weak today. Her daughter thinks this is partially related to depression because she hasn't wanted to eat for some time now. The patient has some chronic back and leg cramping which her daughter attributes to low water intake. She has a history of lung cancer and had CT imaging about two weeks ago that showed a new tumor. Her oncologist is optimistic that this tumor can be treated with radiation because it is non-small cell. The patient was started on a new medication for depression on Friday 7/23 (3 days ago). Denies head injuries, urinary symptoms, or any other complaints at this time. She denies any history of UTI.      REVIEW OF SYSTEMS   Except as stated in the HPI all other systems reviewed and are negative.    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 (H)      Short-term memory loss      Stroke (H)        PAST SURGICAL HISTORY:  Past Surgical History:   Procedure Laterality Date     BLADDER SURGERY  1970     BLADDER SURGERY       C LAP,CHOLECYSTECTOMY/EXPLORE N/A 6/3/2019    Procedure: CHOLECYSTECTOMY, LAPAROSCOPIC;  Surgeon: Samson Cobb MD;  Location: [facility] Main OR;  Service: General     CATARACT EXTRACTION       CATARACT IOL, RT/LT       CHOLECYSTECTOMY  06/03/2019     CT BIOPSY LUNG  7/8/2019     CT BIOPSY LUNG  10/30/2019     CT BIOPSY LUNG  7/8/2021     HYSTERECTOMY  1970     HYSTERECTOMY  age 30     MEDIASTINOSCOPY  08/08/2019    lymph node biopsy     MEDIASTINOSCOPY N/A 8/8/2019    Procedure: MEDIASTINOSCOPY;  Surgeon: Bernard Rosado MD;  Location: [facility] Main OR;  Service: Cardiovascular     OOPHORECTOMY  1970     OOPHORECTOMY  age 30     THORACOSCOPIC WEDGE RESECTION LUNG Left 8/30/2019    Procedure: Left Video Assisted Thoracoscopic Superior Trisegmentectomy;  Surgeon: Bernard Rosado MD;  Location: [facility]       CURRENT MEDICATIONS:    No current facility-administered medications for this encounter.    Current Outpatient Medications:      acetaminophen (TYLENOL) 500 MG tablet, Take 1,000 mg by mouth as needed , Disp: , Rfl:      amLODIPine (NORVASC) 10 MG tablet, Take 1 tablet (10 mg) by mouth daily, Disp: 30 tablet, Rfl: 1     atorvastatin (LIPITOR) 40 MG tablet, TAKE 1 TABLET BY MOUTH EVERYDAY AT BEDTIME, Disp: , Rfl: 1     clopidogrel (PLAVIX) 75 MG tablet, Take 75 mg by mouth every morning , Disp: , Rfl: 1     donepezil (ARICEPT) 10 MG tablet, TAKE 1 TABLET BY MOUTH EVERYDAY AT BEDTIME, Disp: , Rfl:      enoxaparin (LOVENOX) 40 MG/0.4ML syringe, Inject 0.4 mLs (40 mg) Subcutaneous  every 24 hours (Patient not taking: Reported on 2021), Disp: 4 Syringe, Rfl: 0     ferrous sulfate (FEROSUL) 325 (65 Fe) MG tablet, daily, Disp: , Rfl:      fluticasone (FLONASE) 50 MCG/ACT nasal spray, Spray 1 spray in nostril every morning , Disp: , Rfl:      furosemide (LASIX) 20 MG tablet, Take 20 mg by mouth every morning , Disp: , Rfl:      metoprolol tartrate (LOPRESSOR) 25 MG tablet, Take 0.5 tablets (12.5 mg) by mouth 2 times daily (Patient taking differently: Take 50 mg by mouth 2 times daily 1.5 tablets twice daily), Disp: 60 tablet, Rfl: 3     Nutritional Supplements (ENSURE COMPLETE PO), Take 1 Can by mouth as needed (Patient not taking: Reported on 2021), Disp: , Rfl:      oxyCODONE (ROXICODONE) 5 MG tablet, Take 1 tablet (5 mg) by mouth every 4 hours as needed for moderate to severe pain (pain control or improvement in physical function. Hold dose for analgesic side effects.) (Patient not taking: Reported on 2021), Disp: 20 tablet, Rfl: 0     senna-docusate (SENOKOT-S/PERICOLACE) 8.6-50 MG tablet, Take 1-2 tablets by mouth 2 times daily (Patient not taking: Reported on 2021), Disp: 40 tablet, Rfl: 0     UNABLE TO FIND, Apply 1 g topically as needed MEDICATION NAME: THERAWORKS, Disp: , Rfl:     ALLERGIES:  No Known Allergies    FAMILY HISTORY:  Family History   Problem Relation Age of Onset     Other - See Comments Father          from stroke during gallbladder surgery      Colon Cancer Mother      Breast Cancer Sister         50s     Cancer Maternal Aunt         type unknown       SOCIAL HISTORY:   Social History     Socioeconomic History     Marital status:      Spouse name: Not on file     Number of children: Not on file     Years of education: Not on file     Highest education level: Not on file   Occupational History     Not on file   Tobacco Use     Smoking status: Former Smoker     Types: Cigarettes     Start date:      Quit date: 2007     Years since  "quittin.1     Smokeless tobacco: Never Used   Substance and Sexual Activity     Alcohol use: Yes     Comment: Alcoholic Drinks/day: occassional     Drug use: Never     Sexual activity: Not on file   Other Topics Concern     Not on file   Social History Narrative     Not on file     Social Determinants of Health     Financial Resource Strain:      Difficulty of Paying Living Expenses:    Food Insecurity:      Worried About Running Out of Food in the Last Year:      Ran Out of Food in the Last Year:    Transportation Needs:      Lack of Transportation (Medical):      Lack of Transportation (Non-Medical):    Physical Activity:      Days of Exercise per Week:      Minutes of Exercise per Session:    Stress:      Feeling of Stress :    Social Connections:      Frequency of Communication with Friends and Family:      Frequency of Social Gatherings with Friends and Family:      Attends J[employer] Services:      Active Member of Clubs or Organizations:      Attends Club or Organization Meetings:      Marital Status:    Intimate Partner Violence:      Fear of Current or Ex-Partner:      Emotionally Abused:      Physically Abused:      Sexually Abused:        PHYSICAL EXAM    VITAL SIGNS: /57   Pulse 52   Temp 98.7  F (37.1  C) (Oral)   Resp 21   Ht 1.499 m (4' 11\")   Wt 66.7 kg (147 lb)   SpO2 96%   BMI 29.69 kg/m     GENERAL: Awake, Alert, answering questions, No acute distress, Well nourished  HEENT: Normal cephalic, Atraumatic, bilateral external ears normal, No scleral icterus, mask in place  NECK: No obvious swelling or abnormality, No stridor  PULMONARY:Normal and symmetric breath sounds, No respiratory distress, Lungs clear to auscultation bilaterally. No wheezing  CARDIOVASCULAR: Tachycardic. Regular rhythm, Distal pulses present and normal.  ABDOMINAL: Soft, Nondistended, Nontender, No flank tenderness, No palpable masses  BACK: No bruising or tenderness.  EXTREMITIES: Moves all extremities " spontaneously, warm, no edema, No major deformities  NEURO: No facial droop, normal motor function, Normal speech   PSYCH: Normal mood and affect  SKIN: No rashes on visualized skin, dry, warm     LAB:  All pertinent labs reviewed and interpreted.  Results for orders placed or performed during the hospital encounter of 07/26/21   Head CT w/o contrast    Impression    IMPRESSION:    1.  Partially calcified mass centered within the left parietal and occipital lobes measuring 4.9 x 4.4 x 3.9 cm (AP x TV x CC) which extends into the right lateral ventricle. Localized entrapment of the left lateral ventricle. Recommend further   evaluation with contrast-enhanced brain MRI with perfusion imaging and consultation with neurosurgery.   2.  New cystic lesion within the right frontal lobe measuring 2.2 x 1.7 x 1.9 cm (AP x TV x CC).   3.  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    MR Brain w/o & w Contrast    Impression    IMPRESSION:  1.  Multifocal intra-axial masses concerning for metastatic disease versus multifocal, high-grade glial neoplasm. Lymphoma is also a possibility.    2.  Tumor encasement of the atrium of the left lateral ventricle with left temporal horn entrapment. There is also tumor extending to the ependymal surface with probable seeding involving third and lateral ventricles.    3.  Thickening and enhancement involving the pituitary infundibulum. This may represent tumor involvement versus hypophysitis.    4.  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       Extra Blue Top Tube   Result Value Ref Range    Hold Specimen JIC    Extra Red Top Tube   Result Value Ref Range    Hold Specimen JIC    Extra Green Top (Lithium Heparin) Tube   Result Value Ref Range    Hold Specimen JIC    Extra Purple Top Tube   Result Value Ref Range    Hold Specimen JIC    Comprehensive metabolic panel   Result Value Ref Range    Sodium 144 136 - 145 mmol/L    Potassium 3.0 (L) 3.5  - 5.0 mmol/L    Chloride 103 98 - 107 mmol/L    Carbon Dioxide (CO2) 27 22 - 31 mmol/L    Anion Gap 14 5 - 18 mmol/L    Urea Nitrogen 21 8 - 28 mg/dL    Creatinine 1.51 (H) 0.60 - 1.10 mg/dL    Calcium 10.0 8.5 - 10.5 mg/dL    Glucose 88 70 - 125 mg/dL    Alkaline Phosphatase 89 45 - 120 U/L    AST 20 0 - 40 U/L    ALT 14 0 - 45 U/L    Protein Total 7.1 6.0 - 8.0 g/dL    Albumin 4.1 3.5 - 5.0 g/dL    Bilirubin Total 1.5 (H) 0.0 - 1.0 mg/dL    GFR Estimate 32 (L) >60 mL/min/1.73m2   Result Value Ref Range    Troponin I 0.01 0.00 - 0.29 ng/mL   Result Value Ref Range    Magnesium 2.1 1.8 - 2.6 mg/dL   CBC with platelets and differential   Result Value Ref Range    WBC Count 6.0 4.0 - 11.0 10e3/uL    RBC Count 4.79 3.80 - 5.20 10e6/uL    Hemoglobin 15.2 11.7 - 15.7 g/dL    Hematocrit 44.6 35.0 - 47.0 %    MCV 93 78 - 100 fL    MCH 31.7 26.5 - 33.0 pg    MCHC 34.1 31.5 - 36.5 g/dL    RDW 13.1 10.0 - 15.0 %    Platelet Count 193 150 - 450 10e3/uL    % Neutrophils 52 %    % Lymphocytes 36 %    % Monocytes 10 %    % Eosinophils 1 %    % Basophils 1 %    % Immature Granulocytes 0 %    NRBCs per 100 WBC 0 <1 /100    Absolute Neutrophils 3.2 1.6 - 8.3 10e3/uL    Absolute Lymphocytes 2.2 0.8 - 5.3 10e3/uL    Absolute Monocytes 0.6 0.0 - 1.3 10e3/uL    Absolute Eosinophils 0.0 0.0 - 0.7 10e3/uL    Absolute Basophils 0.0 0.0 - 0.2 10e3/uL    Absolute Immature Granulocytes 0.0 <=0.0 10e3/uL    Absolute NRBCs 0.0 10e3/uL       RADIOLOGY:      MR Brain w/o & w Contrast   Final Result   IMPRESSION:   1.  Multifocal intra-axial masses concerning for metastatic disease versus multifocal, high-grade glial neoplasm. Lymphoma is also a possibility.      2.  Tumor encasement of the atrium of the left lateral ventricle with left temporal horn entrapment. There is also tumor extending to the ependymal surface with probable seeding involving third and lateral ventricles.      3.  Thickening and enhancement involving the pituitary  infundibulum. This may represent tumor involvement versus hypophysitis.      4.  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            Head CT w/o contrast   Final Result   IMPRESSION:     1.  Partially calcified mass centered within the left parietal and occipital lobes measuring 4.9 x 4.4 x 3.9 cm (AP x TV x CC) which extends into the right lateral ventricle. Localized entrapment of the left lateral ventricle. Recommend further    evaluation with contrast-enhanced brain MRI with perfusion imaging and consultation with neurosurgery.    2.  New cystic lesion within the right frontal lobe measuring 2.2 x 1.7 x 1.9 cm (AP x TV x CC).    3.  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           EKG:    Date and time: July 26, 2021 1819  Rate: 82 bpm  Rhythm: Atrial fibrillation  QRS interval: 74 ms  QT/QTc: 414/43 ms  ST changes or T wave changes: No acute ST or T wave abnormalities  Change from prior ECG: No significant change from prior  I have independently reviewed and interpreted this EKG.     PROCEDURES:     Critical Care     Performed by: Elida Basurto   Authorized by: Elida Basurto  Total critical care time: 45 minutes  Critical care was necessary to treat or prevent imminent or life-threatening deterioration of the following conditions: new intracranial mass  Critical care was time spent personally by me on the following activities: development of treatment plan with patient or surrogate, discussions with consultants, examination of patient, evaluation of patient's response to treatment, obtaining history from patient or surrogate, ordering and performing treatments and interventions, ordering and review of laboratory studies, ordering and review of radiographic studies, re-evaluation of patient's condition and monitoring for potential decompensation.  Critical care time was exclusive of separately billable procedures and treating other patients.    Aleshia SOUZA  Von, am serving as a scribe to document services personally performed by Dr. Basurto based on my observation and the provider's statements to me. I, Elida Basurto MD attest that Aleshia Longoria is acting in a scribe capacity, has observed my performance of the services and has documented them in accordance with my direction.    Elida Basurto M.D.  Emergency Medicine  [facility] EMERGENCY DEPARTMENT  [address] [place] [id number] [phone]  Dept: [phone]       Elida Basurto MD  07/26/21 7627

## 2021-07-26 NOTE — ED NOTES
Bed: JNED-17  Expected date: 7/26/21  Expected time:   Means of arrival: Ambulance  Comments:  81 F  MWALIVIA  weakness

## 2021-07-26 NOTE — ED TRIAGE NOTES
Patient arrived via [facility] for evaluation of generalized weakness that started 2 months ago and continues. Per EMS, patient has no appeitite and has not been eating and drinking over the last 24 hours and very little urine output. EMS report stable VS. History of small cell lung cancer. EMS stated has history of left-sided residual weakness from old stroke.

## 2021-07-27 NOTE — TELEPHONE ENCOUNTER
Patient's daughter Mary Kay calls in today stating that her mother was in the ER yesterday and they have found a mass in her brain.  They were trying to admit her to the hospital to have neurology see her however there are no beds in the [facility] and the closest place for admission would have been [facility] in [place].  The family asked that she be discharged home and they will set up their own appointments with neurology.  They contacted neurology today and the soonest that they could get in is 8/26/2021.  She was supposed to see Dr. Beasley yesterday however canceled that due to her being brought to the ER for nausea and weakness.  They are wondering how to go about getting her a sooner neurology appointment and how they should proceed.  I discussed this with Dr Niño who states that he did receive an update on this from another provider.  He thinks the best provider to see her at this time due to the new brain lesion/mass is Dr. Beasley.  He also states that if she has worsening or new symptoms such as the weakness and nausea that she has been having, she should give us or Dr. Beasley's office a call as we will possibly need to start her on steroids in addition to the Keppra.  I updated Mary Kay that I am sending this message over to Dr. Beasley and his team.  I also let her know about the progressive symptoms and she verbalized understanding.  She is aware that this will likely take place tomorrow as it is quite late in the day.    Chinyere Vega RN

## 2021-07-27 NOTE — ED PROVIDER NOTES
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  Initial plan had been for hospitalization at the site with neurosurgical specialties.  Shortly through ED course under my care however family approached me and have expressed the desire that the patient would like to go home.    I sat with the patient and multiple family members at the bedside and had an extensive discussion regarding the goals of care.  Both the patient and the family conveyed to this provider that she would not desire surgical intervention, chemotherapeutic intervention, radiation intervention and thus would likely focus more on goals of comfort as opposed to aggressive interventions.  Thusly there are no beds available within the [facility], [place] region and they do not want us to look further at [facility].  They would like to discharge to home at this time knowing full well that this could progress and rapidly and result in fatal outcome.    I did speak again with neurosurgery who then defers to neurology and oncology.  Spoke with on-call neurologist who recommends starting Keppra twice daily.  Spoke with oncology regarding steroids and not recommend steroids at this time in the absence of focal deficit but advised very close follow-up to oncology to discuss if any further treatments or interventions would be considered by the patient.    At the conclusion of the encounter I discussed the results of all of the tests and the disposition. All questions were answered. The patient and or family acknowledged understanding and was involved in the decision making regarding the overall care plan. I discussed the utility, limitations and findings of the exam/interventions/studies done during this visit as well as the list of differential diagnosis and symptoms for which to monitor/return to ED. Verbalizes understanding.      Vitals:    07/26/21 2300 07/26/21 2315 07/26/21 2330 07/27/21 0000   BP: 127/62 104/50 (!) 143/67 130/60   Pulse: (!) 49 52 53 (!) 48   Resp:       Temp:       TempSrc:       SpO2: 97% 97% 98% 98%   Weight:       Height:           Results for orders placed or performed during the hospital encounter of 07/26/21   Head CT w/o contrast    Impression    IMPRESSION:    1.  Partially calcified mass centered within the left parietal and occipital lobes measuring 4.9 x 4.4 x 3.9 cm (AP x TV x CC) which extends into the right lateral ventricle. Localized entrapment of the left lateral ventricle. Recommend further   evaluation with contrast-enhanced brain MRI with perfusion imaging and consultation with neurosurgery.   2.  New cystic lesion within the right frontal lobe measuring 2.2 x 1.7 x 1.9 cm (AP x TV x CC).   3.  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    MR Brain w/o & w Contrast    Impression    IMPRESSION:  1.  Multifocal intra-axial masses concerning for metastatic disease versus multifocal, high-grade glial neoplasm. Lymphoma is also a possibility.    2.  Tumor encasement of the atrium of the left lateral ventricle with left temporal horn entrapment. There is also tumor extending to the ependymal surface with probable seeding involving third and lateral ventricles.    3.  Thickening and enhancement involving the pituitary infundibulum. This may represent tumor involvement versus hypophysitis.    4.  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       Extra Blue Top Tube   Result Value Ref Range    Hold Specimen JIC    Extra Red Top Tube   Result Value Ref Range    Hold Specimen JIC    Extra Green Top (Lithium Heparin) Tube   Result Value Ref Range    Hold Specimen JIC    Extra Purple Top Tube   Result Value Ref Range    Hold Specimen JIC    Comprehensive metabolic panel   Result Value Ref Range    Sodium 144 136 - 145 mmol/L    Potassium 3.0 (L) 3.5 - 5.0 mmol/L     Chloride 103 98 - 107 mmol/L    Carbon Dioxide (CO2) 27 22 - 31 mmol/L    Anion Gap 14 5 - 18 mmol/L    Urea Nitrogen 21 8 - 28 mg/dL    Creatinine 1.51 (H) 0.60 - 1.10 mg/dL    Calcium 10.0 8.5 - 10.5 mg/dL    Glucose 88 70 - 125 mg/dL    Alkaline Phosphatase 89 45 - 120 U/L    AST 20 0 - 40 U/L    ALT 14 0 - 45 U/L    Protein Total 7.1 6.0 - 8.0 g/dL    Albumin 4.1 3.5 - 5.0 g/dL    Bilirubin Total 1.5 (H) 0.0 - 1.0 mg/dL    GFR Estimate 32 (L) >60 mL/min/1.73m2   Result Value Ref Range    Troponin I 0.01 0.00 - 0.29 ng/mL   Result Value Ref Range    Magnesium 2.1 1.8 - 2.6 mg/dL   CBC with platelets and differential   Result Value Ref Range    WBC Count 6.0 4.0 - 11.0 10e3/uL    RBC Count 4.79 3.80 - 5.20 10e6/uL    Hemoglobin 15.2 11.7 - 15.7 g/dL    Hematocrit 44.6 35.0 - 47.0 %    MCV 93 78 - 100 fL    MCH 31.7 26.5 - 33.0 pg    MCHC 34.1 31.5 - 36.5 g/dL    RDW 13.1 10.0 - 15.0 %    Platelet Count 193 150 - 450 10e3/uL    % Neutrophils 52 %    % Lymphocytes 36 %    % Monocytes 10 %    % Eosinophils 1 %    % Basophils 1 %    % Immature Granulocytes 0 %    NRBCs per 100 WBC 0 <1 /100    Absolute Neutrophils 3.2 1.6 - 8.3 10e3/uL    Absolute Lymphocytes 2.2 0.8 - 5.3 10e3/uL    Absolute Monocytes 0.6 0.0 - 1.3 10e3/uL    Absolute Eosinophils 0.0 0.0 - 0.7 10e3/uL    Absolute Basophils 0.0 0.0 - 0.2 10e3/uL    Absolute Immature Granulocytes 0.0 <=0.0 10e3/uL    Absolute NRBCs 0.0 10e3/uL     XR Chest 1 View    Result Date: 7/8/2021  EXAM: XR CHEST 1 VIEW LOCATION: [facility] DATE/TIME: 7/8/2021 10:35 AM INDICATION: Post left lung biopsy & fiducial placement. Evaluate for pneumo. COMPARISON: CT-guided lung biopsy and fiducial placement, 07/08/2021 at 0827 hours.     No pneumothorax. Single fiducial marker seen involving the medial aspect of left lower lobe corresponding to recently placed fiducial. There is some hazy opacity about the fiducial marker, compatible with postbiopsy  hemorrhage. A single fiducial marker seen within the right upper lobe which is surrounded by bandlike opacity, stable. Heart size is normal. Atherosclerotic calcification involves the thoracic aortic arch. Prior surgical changes to the left lung apex.    CT Lung Mediastinum Biopsy    Result Date: 7/8/2021  EXAM: 1. PERCUTANEOUS BIOPSY AND FIDUCIAL MARKER PLACEMENT OF A LEFT LOWER LOBE PULMONARY NODULE 2. CT GUIDANCE 3. CONSCIOUS SEDATION LOCATION: [facility] DATE/TIME: 7/8/2021 9:25 AM INDICATION: Enlarging left lung nodule. TECHNIQUE: Dose reduction techniques were used. PROCEDURE: Informed consent obtained. Site marked. Prior images reviewed. Required items made available. Patient identity confirmed verbally and with arm band. Patient reevaluated immediately before administering sedation. Universal protocol was followed. Time out performed. The site was prepped and draped in sterile fashion. 8 mL of 1% lidocaine was infused into the local soft tissues. Using standard technique and under direct CT guidance, a 20-gauge biopsy device was used to obtain 2 core biopsies of the 9 mm nodule. Biopsy was somewhat challenging given variability of nodule location with respect to patient's respirations as well has hemorrhage occurring prior to biopsy resulting in some patient coughing. A single fiducial marker was placed along the posterior margin of this nodule. Tissue was submitted to Pathology and tissue demonstrated atypical cells by preliminary review by a pathologist. The patient tolerated the procedure well. No immediate complications. SEDATION: Versed 2 mg. Fentanyl 100 mcg. The procedure was performed with administration intravenous conscious sedation with appropriate preoperative, intraoperative, and postoperative evaluation. 45 minutes of supervised face to face conscious sedation time was provided by a radiology nurse under my direct supervision.     1.  Successful CT-guided biopsy and  fiducial marker placement of a subcentimeter left lower lobe pulmonary nodule. Reference CPT Codes: 34717, 70053, 01730, 47116, 14386    MR Brain w/o & w Contrast    Result Date: 7/26/2021  EXAM: MR BRAIN W/O and W CONTRAST LOCATION: [facility] DATE/TIME: 7/26/2021 8:37 PM INDICATION: Nausea and weakness COMPARISON: Head CT 26 July 2021. Brain MRI November 11, 2019. CONTRAST: 5ml Gadavist TECHNIQUE: Routine multiplanar multisequence head MRI without and with intravenous contrast. FINDINGS: INTRACRANIAL CONTENTS: Intra-axial mass is redemonstrated at the left parieto-occipital junction with heterogeneous enhancement, scattered, central microcalcifications and T1 signal hyperintensity on the precontrast imaging. Lesion measures 5.8 x 4.1 cm transverse by 4.7 cm cephalocaudad. Moderate localized mass effect. Minimal surrounding vasogenic edema. Lesion is T2 hypointense with a few tiny areas of cystic or necrotic change. Entrapment of the atrium of the left lateral ventricle noted and there is tumor extending to the ependymal surface. Linear enhancement along the ventricular surface is noted highly suspicious for tumor seeding. Slight entrapment of the left temporal horn. Enhancement also noted in the third ventricle compatible with intraventricular tumor. Peripherally enhancing cystic lesion in the anterior/inferior right frontal lobe is present measuring 27 x 20 mm transverse by 22 mm cephalocaudad. Minimal surrounding vasogenic edema. Thickening and enhancement of the pituitary infundibulum is noted findings concerning for tumor spread. Sometimes, hypophysitis can occur as a result of chemotherapy administration. Correlate clinically. Negative for acute infarct or hemorrhage. 3 mm rightward subfalcial shift as before. T1 hyperintense lesion arising from the right MCA trifurcation is compatible with large, calcified aneurysm measuring 1.1 cm in diameter. This may be thrombosed as there is no  apparent flow void or evidence for phase artifact typically seen with an aneurysm of this size. MR perfusion with moderately increased perfusion to the right frontal lesion and increased perfusion in the inferior aspect of the the left posterior hemispheric lesion. SELLA: Infundibular thickening and enhancement as above. OSSEOUS STRUCTURES/SOFT TISSUES: Normal marrow signal. Right MCA aneurysm. Flow voids otherwise unremarkable. ORBITS: Prior bilateral cataract surgery. Visualized portions of the orbits are otherwise unremarkable. SINUSES/MASTOIDS: No paranasal sinus mucosal disease. No middle ear or mastoid effusion.     IMPRESSION: 1.  Multifocal intra-axial masses concerning for metastatic disease versus multifocal, high-grade glial neoplasm. Lymphoma is also a possibility. 2.  Tumor encasement of the atrium of the left lateral ventricle with left temporal horn entrapment. There is also tumor extending to the ependymal surface with probable seeding involving third and lateral ventricles. 3.  Thickening and enhancement involving the pituitary infundibulum. This may represent tumor involvement versus hypophysitis. 4.  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     Head CT w/o contrast    Result Date: 7/26/2021  EXAM: CT HEAD W/O CONTRAST LOCATION: [facility] DATE/TIME: 7/26/2021 6:23 PM INDICATION: Nausea, weakness COMPARISON: MRI brain 11/11/2019. TECHNIQUE: Routine CT Head without IV contrast. Multiplanar reformats. Dose reduction techniques were used. FINDINGS: INTRACRANIAL CONTENTS: Partially calcified mass centered within the left parietal and occipital lobes measuring 4.9 x 4.4 x 3.9 cm (AP x TV x CC) which extends into the right lateral ventricle. Localized entrapment of the left lateral ventricle. New cystic lesion within the right frontal lobe measuring 2.2 x 1.7 x 1.9 cm (AP x TV x CC). 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 No  evidence of acute intracranial hemorrhage. No midline shift. Gray-white matter differentiation is maintained. The basilar cisterns are patent. VISUALIZED ORBITS/SINUSES/MASTOIDS: Bilateral cataract surgery. The partially imaged globes are otherwise unremarkable. The partially imaged paranasal sinuses are otherwise unremarkable. The mastoid air cells are unremarkable. BONES/SOFT TISSUES: The visualized skull base and calvarium are unremarkable..     IMPRESSION:  1.  Partially calcified mass centered within the left parietal and occipital lobes measuring 4.9 x 4.4 x 3.9 cm (AP x TV x CC) which extends into the right lateral ventricle. Localized entrapment of the left lateral ventricle. Recommend further evaluation with contrast-enhanced brain MRI with perfusion imaging and consultation with neurosurgery. 2.  New cystic lesion within the right frontal lobe measuring 2.2 x 1.7 x 1.9 cm (AP x TV x CC). 3.  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     CT Fiducial Placement Lung    Result Date: 7/8/2021  EXAM: 1. PERCUTANEOUS BIOPSY AND FIDUCIAL MARKER PLACEMENT OF A LEFT LOWER LOBE PULMONARY NODULE 2. CT GUIDANCE 3. CONSCIOUS SEDATION LOCATION: [facility] DATE/TIME: 7/8/2021 9:25 AM INDICATION: Enlarging left lung nodule. TECHNIQUE: Dose reduction techniques were used. PROCEDURE: Informed consent obtained. Site marked. Prior images reviewed. Required items made available. Patient identity confirmed verbally and with arm band. Patient reevaluated immediately before administering sedation. Universal protocol was followed. Time out performed. The site was prepped and draped in sterile fashion. 8 mL of 1% lidocaine was infused into the local soft tissues. Using standard technique and under direct CT guidance, a 20-gauge biopsy device was used to obtain 2 core biopsies of the 9 mm nodule. Biopsy was somewhat challenging given variability of nodule location with  respect to patient's respirations as well has hemorrhage occurring prior to biopsy resulting in some patient coughing. A single fiducial marker was placed along the posterior margin of this nodule. Tissue was submitted to Pathology and tissue demonstrated atypical cells by preliminary review by a pathologist. The patient tolerated the procedure well. No immediate complications. SEDATION: Versed 2 mg. Fentanyl 100 mcg. The procedure was performed with administration intravenous conscious sedation with appropriate preoperative, intraoperative, and postoperative evaluation. 45 minutes of supervised face to face conscious sedation time was provided by a radiology nurse under my direct supervision.     1.  Successful CT-guided biopsy and fiducial marker placement of a subcentimeter left lower lobe pulmonary nodule. Reference CPT Codes: 80897, 85158, 34261, 22575, 54668      ICD-10-CM    1. Intracranial mass  R90.0    2. Generalized muscle weakness  M62.81    3. Poor appetite  R63.0    4. Nausea  R11.0           Sumanth Rueda MD  07/27/21 0044

## 2021-07-27 NOTE — PLAN OF CARE
Neurosurgery plan of care:    Spoke with Dr Basurto at [facility] regarding patient Sandra 80yo F on lovenox hx lung cancer followed by [facility] oncology who presented today with 1wk hx of progressive n/v, generalized weakness. CT/MRI brain w wo contrast revealed 5cm intra axial left parieto-occipital mass with moderate mass effect, 3mm rightward subfalcial shift, and entrapment of the left lateral ventricle, a 2.7cm right frontal mass, pituitary thickening/enhancement concerning for tumor spread, and a calcified 1.1cm R MCA aneurysm.    Recommended transfer to [facility] or the  for further evaluation/management.    Discussed with Dr Reji Jo FNP-C  [facility]surgery  O. [phone]

## 2021-07-27 NOTE — DISCHARGE INSTRUCTIONS
As discussed, you have a large brain tumor that was found today. It remains somewhat unclear if this is due to metastases from other cancer or a primary brain cancer, or a blood cancer. As you do not desire surgery, chemotherapy, or radiation therapy we will send you home to follow up with the specialists. They would like you to start taking the prescribed medications.     Return to ED if symptoms worsen or new arise

## 2021-07-28 NOTE — PROGRESS NOTES
[facility] Radiation Oncology Follow Up     Patient: Gabriela Watkins  MRN: [id number]  Date of Service: 07/28/2021       DISEASE TREATED:    1. Non-small cell lung cancer involving left upper lobe, stage T3 N0 M0, status post left upper lobe trisegmentectomy with negative margin.   2. Limited stage small cell lung cancer involving right upper lobe, clinical stage T1 N0 M0 status post 4 cycles of chemotherapy with good response.        TYPE OF RADIATION THERAPY ADMINISTERED:  5000 cGy in 5 treatments given from 3/23/2020-4/1/2020.      INTERVAL SINCE COMPLETION OF RADIATION THERAPY: 1 year and 3 months.      SUBJECTIVE:  Ms. Watkins is a 81 y.o. female who is a chronic smoker 50 pack a year for many years.  Patient was admitted to the hospital due to the ongoing cholecystitis in June 2019. She had incidental finding of bilateral lung masses.  CT of the chest shows similar findings.  PET scan shows 4 cm lesion in the left lung and 0.8 cm bilobed lesion in the right lung.  No lymph node involvement.  No evidence of distant metastatic disease.  MRI brain also showed no evidence of metastatic disease.  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  Patient underwent left upper lobe trisegmentectomy by Dr. Oneal, thoracic surgeon at [facility] on 8/30/2019.  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  14 removed lymph nodes showed no evidence of metastatic disease.  Postoperatively she has been recovering well.  The patient received 4 cycles of carbo and etoposide chemotherapy for her small cell lung cancer under the supervision of Dr. Niño, medical oncology. The first 2 cycles were given at a 25% reduction and she had a significant response on her CT scan.  She had a lot of toxicity so she  got her third and the fourth cycle at a 50% reduction.  Her last chemotherapy was given in 2/2020.  She received definitive radiation therapy to her right lung cancer with a total dose of 5000 cGy in 5 treatments given from 3/23/2020-4/1/2020.  She tolerated radiation therapy very well with minimal side effect.     The patient experienced significant decline of her status over the past few weeks.  She had restaging MRI brain on 7/26/2021 which showed a 2 new large brain lesions most consistent with brain metastasis.  The patient is here for the evaluation and discussion of possible management options.  She otherwise denies any seizure activity.  She did feel generalized fatigue and weakness without thrive over the past few weeks.  She also experienced moderate pain primarily in the lower back region.     Medications were reviewed and are up to date on EPIC.    The following portions of the patient's history were reviewed and updated as appropriate: allergies, current medications, past family history, past medical history, past social history, past surgical history and problem list.    Review of Systems:      General  Constitutional  Constitutional (WDL): Exceptions to WDL  Fatigue: Fatigue not relieved by rest OR limiting instrumental ADL  EENT  Eye Disorders  Eye Disorder (WDL): Assessment not pertinent to visit  Ear Disorders  Ear Disorder (WDL): Assessment not pertinent to visit  Respiratory  Respiratory  Respiratory (WDL): All respiratory elements are within defined limits  Cardiovascular  Cardiovascular  Cardiovascular (WDL): Assessment not pertinent to visit  Gastrointestinal  Gastrointestinal  Gastrointestinal (WDL): Exceptions to WDL  Anorexia: Oral intake altered without significant weight loss or malnutrition OR oral nutritional supplements indicated  Nausea: Loss of appetite without alteration in eating habits  Vomiting: Absent or within normal limits  Dehydration: IV fluids indicated  Dysgeusia: Absent  or within normal limits  Dysphagia: Absent or within normal limits  Mucositis Oral: Absent or within normal limits  Esophagitis: Absent or within normal limits  Constipation: Absent or within normal limits  Diarrhea: Increase of less than 4 stools per day over baseline OR mild increase in ostomy output compared to baseline  Pharyngitis: Absent or within normal limits  Dry Mouth: Symptomatic (e.g., dry or thick saliva) without significant dietary alteration OR unstimulated saliva flow greater than 0.2 mL/min  Musculoskeletal  Musculoskeletal and Connective Tissue Disorders  Musculoskeletal & Connective (WDL): Exceptions to WDL  Arthralgia: Moderate pain OR limiting instrumental ADL  Bone Pain: Absent or within normal limits  Generalized Muscle Weakness: Symptomatic OR evident on physical exam OR limiting instrumental ADL  Myalgia: Absent or within normal limits  Integumentary     Neurological  Neurosensory  Neurosensory (WDL): Exceptions to WDL  Peripheral Motor Neuropathy: Absent or within normal limits  Ataxia: Moderate symptoms OR limiting instrumental ADL  Peripheral Sensory Neuropathy: Absent or within normal limits  Confusion: Moderate disorientation OR limiting instrumental ADL  Dizziness: Moderate unsteadiness or sensation of movement OR limiting instrumental ADL  Syncope: Absent or within normal limits  Genitourinary/Reproductive  Genitourinary  Genitourinary (WDL): Exceptions to WDL (incontinent)  Lymphatic  Lymph System Disorders  Lymph (WDL): Assessment not pertinent to visit  Pain  Pain Score: Worst Pain (10)  AUA Assessment                                                              Accompanied by  Accompanied By: family    Objective:      PHYSICAL EXAMINATION:    BP 95/53 (BP Location: Right arm)   Pulse 74   Temp 97.7  F (36.5  C)   Resp 16   Wt 66.7 kg (147 lb)   SpO2 95%   BMI 29.69 kg/m      Gen: Alert, in NAD  Eyes: PERRL, EOMI, sclera anicteric  Pulm: No wheezing, stridor or respiratory  distress  CV: Well-perfused, no cyanosis, no pedal edema  Abdominal: BS+, soft, nontender, nondistended, no hepatomegaly  Back: No step-offs or pain to palpation along the thoracolumbar spine  Rectal: Deferred  : Deferred  Musculoskeletal: Normal muscle bulk and tone  Skin: Normal color and turgor  Neurologic: A/Ox3, CN II-XII intact, normal gait and station  Psychiatric: Appropriate mood and affect     Imaging: Imaging results 30 days: XR Chest 1 View    Result Date: 7/8/2021  EXAM: XR CHEST 1 VIEW LOCATION: [facility] DATE/TIME: 7/8/2021 10:35 AM INDICATION: Post left lung biopsy & fiducial placement. Evaluate for pneumo. COMPARISON: CT-guided lung biopsy and fiducial placement, 07/08/2021 at 0827 hours.     No pneumothorax. Single fiducial marker seen involving the medial aspect of left lower lobe corresponding to recently placed fiducial. There is some hazy opacity about the fiducial marker, compatible with postbiopsy hemorrhage. A single fiducial marker seen within the right upper lobe which is surrounded by bandlike opacity, stable. Heart size is normal. Atherosclerotic calcification involves the thoracic aortic arch. Prior surgical changes to the left lung apex.    CT Lung Mediastinum Biopsy    Result Date: 7/8/2021  EXAM: 1. PERCUTANEOUS BIOPSY AND FIDUCIAL MARKER PLACEMENT OF A LEFT LOWER LOBE PULMONARY NODULE 2. CT GUIDANCE 3. CONSCIOUS SEDATION LOCATION: [facility] DATE/TIME: 7/8/2021 9:25 AM INDICATION: Enlarging left lung nodule. TECHNIQUE: Dose reduction techniques were used. PROCEDURE: Informed consent obtained. Site marked. Prior images reviewed. Required items made available. Patient identity confirmed verbally and with arm band. Patient reevaluated immediately before administering sedation. Universal protocol was followed. Time out performed. The site was prepped and draped in sterile fashion. 8 mL of 1% lidocaine was infused into the local soft  tissues. Using standard technique and under direct CT guidance, a 20-gauge biopsy device was used to obtain 2 core biopsies of the 9 mm nodule. Biopsy was somewhat challenging given variability of nodule location with respect to patient's respirations as well has hemorrhage occurring prior to biopsy resulting in some patient coughing. A single fiducial marker was placed along the posterior margin of this nodule. Tissue was submitted to Pathology and tissue demonstrated atypical cells by preliminary review by a pathologist. The patient tolerated the procedure well. No immediate complications. SEDATION: Versed 2 mg. Fentanyl 100 mcg. The procedure was performed with administration intravenous conscious sedation with appropriate preoperative, intraoperative, and postoperative evaluation. 45 minutes of supervised face to face conscious sedation time was provided by a radiology nurse under my direct supervision.     1.  Successful CT-guided biopsy and fiducial marker placement of a subcentimeter left lower lobe pulmonary nodule. Reference CPT Codes: 56451, 40021, 56721, 62243, 99958    MR Brain w/o & w Contrast    Result Date: 7/26/2021  EXAM: MR BRAIN W/O and W CONTRAST LOCATION: [facility] DATE/TIME: 7/26/2021 8:37 PM INDICATION: Nausea and weakness COMPARISON: Head CT 26 July 2021. Brain MRI November 11, 2019. CONTRAST: 5ml Gadavist TECHNIQUE: Routine multiplanar multisequence head MRI without and with intravenous contrast. FINDINGS: INTRACRANIAL CONTENTS: Intra-axial mass is redemonstrated at the left parieto-occipital junction with heterogeneous enhancement, scattered, central microcalcifications and T1 signal hyperintensity on the precontrast imaging. Lesion measures 5.8 x 4.1 cm transverse by 4.7 cm cephalocaudad. Moderate localized mass effect. Minimal surrounding vasogenic edema. Lesion is T2 hypointense with a few tiny areas of cystic or necrotic change. Entrapment of the atrium of the  left lateral ventricle noted and there is tumor extending to the ependymal surface. Linear enhancement along the ventricular surface is noted highly suspicious for tumor seeding. Slight entrapment of the left temporal horn. Enhancement also noted in the third ventricle compatible with intraventricular tumor. Peripherally enhancing cystic lesion in the anterior/inferior right frontal lobe is present measuring 27 x 20 mm transverse by 22 mm cephalocaudad. Minimal surrounding vasogenic edema. Thickening and enhancement of the pituitary infundibulum is noted findings concerning for tumor spread. Sometimes, hypophysitis can occur as a result of chemotherapy administration. Correlate clinically. Negative for acute infarct or hemorrhage. 3 mm rightward subfalcial shift as before. T1 hyperintense lesion arising from the right MCA trifurcation is compatible with large, calcified aneurysm measuring 1.1 cm in diameter. This may be thrombosed as there is no apparent flow void or evidence for phase artifact typically seen with an aneurysm of this size. MR perfusion with moderately increased perfusion to the right frontal lesion and increased perfusion in the inferior aspect of the the left posterior hemispheric lesion. SELLA: Infundibular thickening and enhancement as above. OSSEOUS STRUCTURES/SOFT TISSUES: Normal marrow signal. Right MCA aneurysm. Flow voids otherwise unremarkable. ORBITS: Prior bilateral cataract surgery. Visualized portions of the orbits are otherwise unremarkable. SINUSES/MASTOIDS: No paranasal sinus mucosal disease. No middle ear or mastoid effusion.     IMPRESSION: 1.  Multifocal intra-axial masses concerning for metastatic disease versus multifocal, high-grade glial neoplasm. Lymphoma is also a possibility. 2.  Tumor encasement of the atrium of the left lateral ventricle with left temporal horn entrapment. There is also tumor extending to the ependymal surface with probable seeding involving third and  lateral ventricles. 3.  Thickening and enhancement involving the pituitary infundibulum. This may represent tumor involvement versus hypophysitis. 4.  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     Holter Monitor 24 hour Adult Pediatric    Result Date: 7/27/2021  [facility] HOLTER REPORT Results:   Indication for study: Evaluate for bradycardia   A 24-hour Holter monitor was applied July 21 with date of interpretation July 27, 2021   Baseline tracing showed sinus rhythm with AZ prolongation, narrow QRS; the average heart rate is 50 bpm and heart rate ranged between 29-74 bpm   Sinus node dysfunction is present with relative sinus bradycardia.  Rates as low as 29 bpm noted at 1:05 PM; blocked P ACs lead to a 3-second pause around 11:35 AM   Ventricular ectopy is infrequent with 414 PVCs there is no couplets or salvos-no complex ventricular ectopy   Supraventricular ectopy consists 1462 PACs including a 4 beat reema of atrial tachycardia at a rate of 121 bpm no prolonged atrial tachycardia no atrial fibrillation   Some AZ prolongation, some blocked PACs but no high-grade AV block   patient activity diary was reviewed but no symptoms no cardiac-like symptoms noted   Conclusion   Sinus node dysfunction with sinus bradycardia heart rates as low as 29 bpm occurring at 1 PM.  Other bradycardia pauses are notable during sleeping hours as well   Because of the sinus node dysfunction sinus bradycardia and pauses, may be candidate for pacemaker therapy or adjustment of medications Comment: The recording was for 23 hrs and 59 min.  The recording quality was adequate.     Head CT w/o contrast    Result Date: 7/26/2021  EXAM: CT HEAD W/O CONTRAST LOCATION: [facility] DATE/TIME: 7/26/2021 6:23 PM INDICATION: Nausea, weakness COMPARISON: MRI brain 11/11/2019. TECHNIQUE: Routine CT Head without IV contrast. Multiplanar reformats. Dose reduction techniques were used. FINDINGS:  INTRACRANIAL CONTENTS: Partially calcified mass centered within the left parietal and occipital lobes measuring 4.9 x 4.4 x 3.9 cm (AP x TV x CC) which extends into the right lateral ventricle. Localized entrapment of the left lateral ventricle. New cystic lesion within the right frontal lobe measuring 2.2 x 1.7 x 1.9 cm (AP x TV x CC). 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 No evidence of acute intracranial hemorrhage. No midline shift. Gray-white matter differentiation is maintained. The basilar cisterns are patent. VISUALIZED ORBITS/SINUSES/MASTOIDS: Bilateral cataract surgery. The partially imaged globes are otherwise unremarkable. The partially imaged paranasal sinuses are otherwise unremarkable. The mastoid air cells are unremarkable. BONES/SOFT TISSUES: The visualized skull base and calvarium are unremarkable..     IMPRESSION:  1.  Partially calcified mass centered within the left parietal and occipital lobes measuring 4.9 x 4.4 x 3.9 cm (AP x TV x CC) which extends into the right lateral ventricle. Localized entrapment of the left lateral ventricle. Recommend further evaluation with contrast-enhanced brain MRI with perfusion imaging and consultation with neurosurgery. 2.  New cystic lesion within the right frontal lobe measuring 2.2 x 1.7 x 1.9 cm (AP x TV x CC). 3.  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     CT Fiducial Placement Lung    Result Date: 7/8/2021  EXAM: 1. PERCUTANEOUS BIOPSY AND FIDUCIAL MARKER PLACEMENT OF A LEFT LOWER LOBE PULMONARY NODULE 2. CT GUIDANCE 3. CONSCIOUS SEDATION LOCATION: [facility] DATE/TIME: 7/8/2021 9:25 AM INDICATION: Enlarging left lung nodule. TECHNIQUE: Dose reduction techniques were used. PROCEDURE: Informed consent obtained. Site marked. Prior images reviewed. Required items made available. Patient identity confirmed verbally and with arm band. Patient reevaluated  immediately before administering sedation. Universal protocol was followed. Time out performed. The site was prepped and draped in sterile fashion. 8 mL of 1% lidocaine was infused into the local soft tissues. Using standard technique and under direct CT guidance, a 20-gauge biopsy device was used to obtain 2 core biopsies of the 9 mm nodule. Biopsy was somewhat challenging given variability of nodule location with respect to patient's respirations as well has hemorrhage occurring prior to biopsy resulting in some patient coughing. A single fiducial marker was placed along the posterior margin of this nodule. Tissue was submitted to Pathology and tissue demonstrated atypical cells by preliminary review by a pathologist. The patient tolerated the procedure well. No immediate complications. SEDATION: Versed 2 mg. Fentanyl 100 mcg. The procedure was performed with administration intravenous conscious sedation with appropriate preoperative, intraoperative, and postoperative evaluation. 45 minutes of supervised face to face conscious sedation time was provided by a radiology nurse under my direct supervision.     1.  Successful CT-guided biopsy and fiducial marker placement of a subcentimeter left lower lobe pulmonary nodule. Reference CPT Codes: 77149, 83619, 41367, 45565, 46910       Impression     The patient is a 81-year-old female with diagnosis of stage IV lung cancer with a recent finding of 2 large brain metastases.    Assessment & Plan:     I have personally reviewed her upcoming medical record today.  I have also reviewed with her most recent radiology study including MRI brain.  This is 81-year-old female with history of both small cell and non-small cell lung cancer with recent finding of 2 large brain metastases.  The possible treatment options including surgery, systemic therapy, radiation therapy, and conservative management has been discussed with the patient in detail and at the great lengths.  The  possible risks and side effects of radiation therapy has also been explained to the patient.  Unfortunately the brain MRI showed 2 new large brain metastases.  The patient has a significant mental and physical function decline over the past few weeks.  Her life expectancy is limited given the current status of her disease and medical condition.  The pros and the cons of different options has also been discussed with the patient and her family.  After long discussion, the patient and her family elected to proceed with hospice care as her treatment choice.  I will make a appropriate referral for palliative/hospice care.  The patient will continue her future care with hospice.  She is informed to contact radiation oncology at any time if she decides to consider palliative radiation therapy in the future.  I will also prescribe Decadron 2 mg 3 times daily as well as oxycodone 5 mg as needed for her symptoms control.      Face to face time  40 minutes with > 80% spent on consultation, education and coordination of care.    Stephani Beasley MD, PhD  Department of Radiation Oncology   [facility]  Tel: [phone]  Page: [phone]    [place] [id number] [place] [address]    CC:  Patient Care Team:  Renay Arguello MD as PCP - General (Family Medicine)  Quintin Niño MD as MD (Hematology & Oncology)  [place] ([facility] ([facility] ([place]))  Stephani Beasley MD as MD (Hematology & Oncology)  Paradise Gipson, RN as Specialty Care Coordinator (Hematology & Oncology)  Stephani Beasley MD as Assigned Cancer Care Provider  Sybil Magdaleno MD as Assigned Heart and Vascular Provider  En Green MD as MD (Neurology)

## 2021-07-28 NOTE — PROGRESS NOTES
"Sandra is here with her daugher and DIL. She has not been out of bed for 4 days. She doesn't have an appetite. Her daughter took her to the ED for dehydration and they gave her some fluids but could not admit her as they did not have a bed. She keeps repeating that she \"hurts\" and she wants to go home. Her daughter is her care giver and is not medicating her for pain. JULIANNA Palomo, OCN, CBCN  "

## 2021-10-10 ENCOUNTER — HEALTH MAINTENANCE LETTER (OUTPATIENT)
Age: 81
End: 2021-10-10

## 2022-01-30 ENCOUNTER — HEALTH MAINTENANCE LETTER (OUTPATIENT)
Age: 82
End: 2022-01-30

## 2022-05-20 NOTE — PROGRESS NOTES
Pt here for treatment after seeing NP. Pt and her daughter state this is her last treatment. IV placed on 4th attempt. No problem with infusion as directed and pt denies new complaint. IV removed and pt d/c ambulatory to lobby with her daughter.   PRINCIPAL DISCHARGE DIAGNOSIS  Diagnosis: Acute gangrenous appendicitis with perforation and peritonitis  Assessment and Plan of Treatment:        Stage 3 chronic kidney disease

## 2022-06-22 NOTE — LETTER
"    7/28/2021         RE: Gabriela Watkins  [address] [place] [address]        Dear Colleague,    Thank you for referring your patient, Gabriela Watkins, to the [facility] [place]. Please see a copy of my visit note below.    Sandra is here with her daugher and DIL. She has not been out of bed for 4 days. She doesn't have an appetite. Her daughter took her to the ED for dehydration and they gave her some fluids but could not admit her as they did not have a bed. She keeps repeating that she \"hurts\" and she wants to go home. Her daughter is her care giver and is not medicating her for pain. Dewey RN, OCN, CBCN    [facility] Radiation Oncology Follow Up     Patient: Gabriela Watkins  MRN: [id number]  Date of Service: 07/28/2021       DISEASE TREATED:    1. Non-small cell lung cancer involving left upper lobe, stage T3 N0 M0, status post left upper lobe trisegmentectomy with negative margin.   2. Limited stage small cell lung cancer involving right upper lobe, clinical stage T1 N0 M0 status post 4 cycles of chemotherapy with good response.        TYPE OF RADIATION THERAPY ADMINISTERED:  5000 cGy in 5 treatments given from 3/23/2020-4/1/2020.      INTERVAL SINCE COMPLETION OF RADIATION THERAPY: 1 year and 3 months.      SUBJECTIVE:  Ms. Watkins is a 81 y.o. female who is a chronic smoker 50 pack a year for many years.  Patient was admitted to the hospital due to the ongoing cholecystitis in June 2019. She had incidental finding of bilateral lung masses.  CT of the chest shows similar findings.  PET scan shows 4 cm lesion in the left lung and 0.8 cm bilobed lesion in the right lung.  No lymph node involvement.  No evidence of distant metastatic disease.  MRI brain also showed no evidence of metastatic disease.  CT-guided needle biopsy was done and pathology confirmed non-small cell adenocarcinoma involving the left upper lobe and small cell lung cancer involving right " Loree calling about no show letter.  Loree states that Dr Maynard is no longer cover by [employer] insurance and that he is also moving into a living facility.    upper lobe.  Patient underwent left upper lobe trisegmentectomy by Dr. Oneal, thoracic surgeon at [facility] on 8/30/2019.  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  14 removed lymph nodes showed no evidence of metastatic disease.  Postoperatively she has been recovering well.  The patient received 4 cycles of carbo and etoposide chemotherapy for her small cell lung cancer under the supervision of Dr. Niño, medical oncology. The first 2 cycles were given at a 25% reduction and she had a significant response on her CT scan.  She had a lot of toxicity so she got her third and the fourth cycle at a 50% reduction.  Her last chemotherapy was given in 2/2020.  She received definitive radiation therapy to her right lung cancer with a total dose of 5000 cGy in 5 treatments given from 3/23/2020-4/1/2020.  She tolerated radiation therapy very well with minimal side effect.     The patient experienced significant decline of her status over the past few weeks.  She had restaging MRI brain on 7/26/2021 which showed a 2 new large brain lesions most consistent with brain metastasis.  The patient is here for the evaluation and discussion of possible management options.  She otherwise denies any seizure activity.  She did feel generalized fatigue and weakness without thrive over the past few weeks.  She also experienced moderate pain primarily in the lower back region.     Medications were reviewed and are up to date on EPIC.    The following portions of the patient's history were reviewed and updated as appropriate: allergies, current medications, past family history, past medical history, past social history, past surgical history and problem list.    Review of Systems:      General  Constitutional  Constitutional (WDL): Exceptions to WDL  Fatigue: Fatigue not relieved by rest OR limiting instrumental ADL  EENT  Eye  Disorders  Eye Disorder (WDL): Assessment not pertinent to visit  Ear Disorders  Ear Disorder (WDL): Assessment not pertinent to visit  Respiratory  Respiratory  Respiratory (WDL): All respiratory elements are within defined limits  Cardiovascular  Cardiovascular  Cardiovascular (WDL): Assessment not pertinent to visit  Gastrointestinal  Gastrointestinal  Gastrointestinal (WDL): Exceptions to WDL  Anorexia: Oral intake altered without significant weight loss or malnutrition OR oral nutritional supplements indicated  Nausea: Loss of appetite without alteration in eating habits  Vomiting: Absent or within normal limits  Dehydration: IV fluids indicated  Dysgeusia: Absent or within normal limits  Dysphagia: Absent or within normal limits  Mucositis Oral: Absent or within normal limits  Esophagitis: Absent or within normal limits  Constipation: Absent or within normal limits  Diarrhea: Increase of less than 4 stools per day over baseline OR mild increase in ostomy output compared to baseline  Pharyngitis: Absent or within normal limits  Dry Mouth: Symptomatic (e.g., dry or thick saliva) without significant dietary alteration OR unstimulated saliva flow greater than 0.2 mL/min  Musculoskeletal  Musculoskeletal and Connective Tissue Disorders  Musculoskeletal & Connective (WDL): Exceptions to WDL  Arthralgia: Moderate pain OR limiting instrumental ADL  Bone Pain: Absent or within normal limits  Generalized Muscle Weakness: Symptomatic OR evident on physical exam OR limiting instrumental ADL  Myalgia: Absent or within normal limits  Integumentary     Neurological  Neurosensory  Neurosensory (WDL): Exceptions to WDL  Peripheral Motor Neuropathy: Absent or within normal limits  Ataxia: Moderate symptoms OR limiting instrumental ADL  Peripheral Sensory Neuropathy: Absent or within normal limits  Confusion: Moderate disorientation OR limiting instrumental ADL  Dizziness: Moderate unsteadiness or sensation of movement OR  limiting instrumental ADL  Syncope: Absent or within normal limits  Genitourinary/Reproductive  Genitourinary  Genitourinary (WDL): Exceptions to WDL (incontinent)  Lymphatic  Lymph System Disorders  Lymph (WDL): Assessment not pertinent to visit  Pain  Pain Score: Worst Pain (10)  AUA Assessment                                                              Accompanied by  Accompanied By: family    Objective:      PHYSICAL EXAMINATION:    BP 95/53 (BP Location: Right arm)   Pulse 74   Temp 97.7  F (36.5  C)   Resp 16   Wt 66.7 kg (147 lb)   SpO2 95%   BMI 29.69 kg/m      Gen: Alert, in NAD  Eyes: PERRL, EOMI, sclera anicteric  Pulm: No wheezing, stridor or respiratory distress  CV: Well-perfused, no cyanosis, no pedal edema  Abdominal: BS+, soft, nontender, nondistended, no hepatomegaly  Back: No step-offs or pain to palpation along the thoracolumbar spine  Rectal: Deferred  : Deferred  Musculoskeletal: Normal muscle bulk and tone  Skin: Normal color and turgor  Neurologic: A/Ox3, CN II-XII intact, normal gait and station  Psychiatric: Appropriate mood and affect     Imaging: Imaging results 30 days: XR Chest 1 View    Result Date: 7/8/2021  EXAM: XR CHEST 1 VIEW LOCATION: [facility] DATE/TIME: 7/8/2021 10:35 AM INDICATION: Post left lung biopsy & fiducial placement. Evaluate for pneumo. COMPARISON: CT-guided lung biopsy and fiducial placement, 07/08/2021 at 0827 hours.     No pneumothorax. Single fiducial marker seen involving the medial aspect of left lower lobe corresponding to recently placed fiducial. There is some hazy opacity about the fiducial marker, compatible with postbiopsy hemorrhage. A single fiducial marker seen within the right upper lobe which is surrounded by bandlike opacity, stable. Heart size is normal. Atherosclerotic calcification involves the thoracic aortic arch. Prior surgical changes to the left lung apex.    CT Lung Mediastinum Biopsy    Result Date:  7/8/2021  EXAM: 1. PERCUTANEOUS BIOPSY AND FIDUCIAL MARKER PLACEMENT OF A LEFT LOWER LOBE PULMONARY NODULE 2. CT GUIDANCE 3. CONSCIOUS SEDATION LOCATION: [facility] DATE/TIME: 7/8/2021 9:25 AM INDICATION: Enlarging left lung nodule. TECHNIQUE: Dose reduction techniques were used. PROCEDURE: Informed consent obtained. Site marked. Prior images reviewed. Required items made available. Patient identity confirmed verbally and with arm band. Patient reevaluated immediately before administering sedation. Universal protocol was followed. Time out performed. The site was prepped and draped in sterile fashion. 8 mL of 1% lidocaine was infused into the local soft tissues. Using standard technique and under direct CT guidance, a 20-gauge biopsy device was used to obtain 2 core biopsies of the 9 mm nodule. Biopsy was somewhat challenging given variability of nodule location with respect to patient's respirations as well has hemorrhage occurring prior to biopsy resulting in some patient coughing. A single fiducial marker was placed along the posterior margin of this nodule. Tissue was submitted to Pathology and tissue demonstrated atypical cells by preliminary review by a pathologist. The patient tolerated the procedure well. No immediate complications. SEDATION: Versed 2 mg. Fentanyl 100 mcg. The procedure was performed with administration intravenous conscious sedation with appropriate preoperative, intraoperative, and postoperative evaluation. 45 minutes of supervised face to face conscious sedation time was provided by a radiology nurse under my direct supervision.     1.  Successful CT-guided biopsy and fiducial marker placement of a subcentimeter left lower lobe pulmonary nodule. Reference CPT Codes: 31592, 64875, 77629, 71640, 41827    MR Brain w/o & w Contrast    Result Date: 7/26/2021  EXAM: MR BRAIN W/O and W CONTRAST LOCATION: [facility] DATE/TIME: 7/26/2021 8:37 PM  INDICATION: Nausea and weakness COMPARISON: Head CT 26 July 2021. Brain MRI November 11, 2019. CONTRAST: 5ml Gadavist TECHNIQUE: Routine multiplanar multisequence head MRI without and with intravenous contrast. FINDINGS: INTRACRANIAL CONTENTS: Intra-axial mass is redemonstrated at the left parieto-occipital junction with heterogeneous enhancement, scattered, central microcalcifications and T1 signal hyperintensity on the precontrast imaging. Lesion measures 5.8 x 4.1 cm transverse by 4.7 cm cephalocaudad. Moderate localized mass effect. Minimal surrounding vasogenic edema. Lesion is T2 hypointense with a few tiny areas of cystic or necrotic change. Entrapment of the atrium of the left lateral ventricle noted and there is tumor extending to the ependymal surface. Linear enhancement along the ventricular surface is noted highly suspicious for tumor seeding. Slight entrapment of the left temporal horn. Enhancement also noted in the third ventricle compatible with intraventricular tumor. Peripherally enhancing cystic lesion in the anterior/inferior right frontal lobe is present measuring 27 x 20 mm transverse by 22 mm cephalocaudad. Minimal surrounding vasogenic edema. Thickening and enhancement of the pituitary infundibulum is noted findings concerning for tumor spread. Sometimes, hypophysitis can occur as a result of chemotherapy administration. Correlate clinically. Negative for acute infarct or hemorrhage. 3 mm rightward subfalcial shift as before. T1 hyperintense lesion arising from the right MCA trifurcation is compatible with large, calcified aneurysm measuring 1.1 cm in diameter. This may be thrombosed as there is no apparent flow void or evidence for phase artifact typically seen with an aneurysm of this size. MR perfusion with moderately increased perfusion to the right frontal lesion and increased perfusion in the inferior aspect of the the left posterior hemispheric lesion. SELLA: Infundibular thickening and  enhancement as above. OSSEOUS STRUCTURES/SOFT TISSUES: Normal marrow signal. Right MCA aneurysm. Flow voids otherwise unremarkable. ORBITS: Prior bilateral cataract surgery. Visualized portions of the orbits are otherwise unremarkable. SINUSES/MASTOIDS: No paranasal sinus mucosal disease. No middle ear or mastoid effusion.     IMPRESSION: 1.  Multifocal intra-axial masses concerning for metastatic disease versus multifocal, high-grade glial neoplasm. Lymphoma is also a possibility. 2.  Tumor encasement of the atrium of the left lateral ventricle with left temporal horn entrapment. There is also tumor extending to the ependymal surface with probable seeding involving third and lateral ventricles. 3.  Thickening and enhancement involving the pituitary infundibulum. This may represent tumor involvement versus hypophysitis. 4.  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     Holter Monitor 24 hour Adult Pediatric    Result Date: 7/27/2021  [facility] HOLTER REPORT Results:   Indication for study: Evaluate for bradycardia   A 24-hour Holter monitor was applied July 21 with date of interpretation July 27, 2021   Baseline tracing showed sinus rhythm with IA prolongation, narrow QRS; the average heart rate is 50 bpm and heart rate ranged between 29-74 bpm   Sinus node dysfunction is present with relative sinus bradycardia.  Rates as low as 29 bpm noted at 1:05 PM; blocked P ACs lead to a 3-second pause around 11:35 AM   Ventricular ectopy is infrequent with 414 PVCs there is no couplets or salvos-no complex ventricular ectopy   Supraventricular ectopy consists 1462 PACs including a 4 beat reema of atrial tachycardia at a rate of 121 bpm no prolonged atrial tachycardia no atrial fibrillation   Some IA prolongation, some blocked PACs but no high-grade AV block   patient activity diary was reviewed but no symptoms no cardiac-like symptoms noted   Conclusion   Sinus node dysfunction with sinus  bradycardia heart rates as low as 29 bpm occurring at 1 PM.  Other bradycardia pauses are notable during sleeping hours as well   Because of the sinus node dysfunction sinus bradycardia and pauses, may be candidate for pacemaker therapy or adjustment of medications Comment: The recording was for 23 hrs and 59 min.  The recording quality was adequate.     Head CT w/o contrast    Result Date: 7/26/2021  EXAM: CT HEAD W/O CONTRAST LOCATION: [facility] DATE/TIME: 7/26/2021 6:23 PM INDICATION: Nausea, weakness COMPARISON: MRI brain 11/11/2019. TECHNIQUE: Routine CT Head without IV contrast. Multiplanar reformats. Dose reduction techniques were used. FINDINGS: INTRACRANIAL CONTENTS: Partially calcified mass centered within the left parietal and occipital lobes measuring 4.9 x 4.4 x 3.9 cm (AP x TV x CC) which extends into the right lateral ventricle. Localized entrapment of the left lateral ventricle. New cystic lesion within the right frontal lobe measuring 2.2 x 1.7 x 1.9 cm (AP x TV x CC). 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 No evidence of acute intracranial hemorrhage. No midline shift. Gray-white matter differentiation is maintained. The basilar cisterns are patent. VISUALIZED ORBITS/SINUSES/MASTOIDS: Bilateral cataract surgery. The partially imaged globes are otherwise unremarkable. The partially imaged paranasal sinuses are otherwise unremarkable. The mastoid air cells are unremarkable. BONES/SOFT TISSUES: The visualized skull base and calvarium are unremarkable..     IMPRESSION:  1.  Partially calcified mass centered within the left parietal and occipital lobes measuring 4.9 x 4.4 x 3.9 cm (AP x TV x CC) which extends into the right lateral ventricle. Localized entrapment of the left lateral ventricle. Recommend further evaluation with contrast-enhanced brain MRI with perfusion imaging and consultation with neurosurgery. 2.  New cystic lesion within  the right frontal lobe measuring 2.2 x 1.7 x 1.9 cm (AP x TV x CC). 3.  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     CT Fiducial Placement Lung    Result Date: 7/8/2021  EXAM: 1. PERCUTANEOUS BIOPSY AND FIDUCIAL MARKER PLACEMENT OF A LEFT LOWER LOBE PULMONARY NODULE 2. CT GUIDANCE 3. CONSCIOUS SEDATION LOCATION: [facility] DATE/TIME: 7/8/2021 9:25 AM INDICATION: Enlarging left lung nodule. TECHNIQUE: Dose reduction techniques were used. PROCEDURE: Informed consent obtained. Site marked. Prior images reviewed. Required items made available. Patient identity confirmed verbally and with arm band. Patient reevaluated immediately before administering sedation. Universal protocol was followed. Time out performed. The site was prepped and draped in sterile fashion. 8 mL of 1% lidocaine was infused into the local soft tissues. Using standard technique and under direct CT guidance, a 20-gauge biopsy device was used to obtain 2 core biopsies of the 9 mm nodule. Biopsy was somewhat challenging given variability of nodule location with respect to patient's respirations as well has hemorrhage occurring prior to biopsy resulting in some patient coughing. A single fiducial marker was placed along the posterior margin of this nodule. Tissue was submitted to Pathology and tissue demonstrated atypical cells by preliminary review by a pathologist. The patient tolerated the procedure well. No immediate complications. SEDATION: Versed 2 mg. Fentanyl 100 mcg. The procedure was performed with administration intravenous conscious sedation with appropriate preoperative, intraoperative, and postoperative evaluation. 45 minutes of supervised face to face conscious sedation time was provided by a radiology nurse under my direct supervision.     1.  Successful CT-guided biopsy and fiducial marker placement of a subcentimeter left lower lobe pulmonary nodule. Reference CPT Codes:  57169, 56893, 26902, 79579, 36119       Impression     The patient is a 81-year-old female with diagnosis of stage IV lung cancer with a recent finding of 2 large brain metastases.    Assessment & Plan:     I have personally reviewed her upcoming medical record today.  I have also reviewed with her most recent radiology study including MRI brain.  This is 81-year-old female with history of both small cell and non-small cell lung cancer with recent finding of 2 large brain metastases.  The possible treatment options including surgery, systemic therapy, radiation therapy, and conservative management has been discussed with the patient in detail and at the great lengths.  The possible risks and side effects of radiation therapy has also been explained to the patient.  Unfortunately the brain MRI showed 2 new large brain metastases.  The patient has a significant mental and physical function decline over the past few weeks.  Her life expectancy is limited given the current status of her disease and medical condition.  The pros and the cons of different options has also been discussed with the patient and her family.  After long discussion, the patient and her family elected to proceed with hospice care as her treatment choice.  I will make a appropriate referral for palliative/hospice care.  The patient will continue her future care with hospice.  She is informed to contact radiation oncology at any time if she decides to consider palliative radiation therapy in the future.  I will also prescribe Decadron 2 mg 3 times daily as well as oxycodone 5 mg as needed for her symptoms control.      Face to face time  40 minutes with > 80% spent on consultation, education and coordination of care.    Stephani Beasley MD, PhD  Department of Radiation Oncology   [facility]  Tel: [phone]  Page: [phone]    [facility]  [address] [place] [address]     [place] [id number] [place]  [address]    CC:  Patient Care Team:  Renay Arguello MD as PCP - General (Family Medicine)  Quintin Niño MD as MD (Hematology & Oncology)  [facility] ([facility] ([place]  Stephani Beasley MD as MD (Hematology & Oncology)  Paradise Gipson, RN as Specialty Care Coordinator (Hematology & Oncology)  Stephani Beasley MD as Assigned Cancer Care Provider  Sybil Magdaleno MD as Assigned Heart and Vascular Provider  En Green MD as MD (Neurology)        Again, thank you for allowing me to participate in the care of your patient.        Sincerely,        Stephani Beasley MD

## 2024-01-30 ENCOUNTER — PATIENT OUTREACH (OUTPATIENT)
Dept: ONCOLOGY | Facility: HOSPITAL | Age: 84
End: 2024-01-30
Payer: COMMERCIAL

## (undated) DEVICE — ESU LIGASURE MARYLAND LAPAROSCOPIC SLR/DVDR 5MMX37CM LF1937

## (undated) DEVICE — PREP CHLORAPREP 26ML TINTED ORANGE  260815

## (undated) DEVICE — STPL ENDO HANDLE GIA ULTRA UNIVERSAL STD EGIAUSTND

## (undated) DEVICE — SPONGE KITTNER 30-101

## (undated) DEVICE — LINEN TOWEL PACK X30 5481

## (undated) DEVICE — CLIP ENDO HEMO-LOC PURPLE LG 544240

## (undated) DEVICE — DRAIN CHEST TUBE 24FR STR 8024

## (undated) DEVICE — TUBING SUCTION 10'X3/16" N510

## (undated) DEVICE — SU VICRYL 0 UR-6 27" J603H

## (undated) DEVICE — ANTIFOG SOLUTION W/FOAM PAD 31142527

## (undated) DEVICE — ESU ELEC BLADE 2.75" COATED/INSULATED E1455

## (undated) DEVICE — SURGICEL HEMOSTAT 4X8" 1952

## (undated) DEVICE — CLIP ENDO HEMO-LOC GREEN MED/LG 544230

## (undated) DEVICE — ESU PENCIL W/COATED BLADE E2450H

## (undated) DEVICE — ESU GROUND PAD ADULT W/CORD E7507

## (undated) DEVICE — SUCTION DRY CHEST DRAIN OASIS 3600-100

## (undated) DEVICE — SU VICRYL 4-0 PS-2 18" UND J496H

## (undated) DEVICE — ESU ELEC BLADE 6" COATED/INSULATED E1455-6

## (undated) DEVICE — Device

## (undated) DEVICE — TIES BANDING T50R

## (undated) DEVICE — ENDO VALVE SUCTION BRONCH EVIS MAJ-209

## (undated) DEVICE — SOL WATER IRRIG 1000ML BOTTLE 2F7114

## (undated) DEVICE — SU VICRYL 3-0 SH 27" UND J416H

## (undated) DEVICE — ENDO VALVE BX EVIS MAJ-210

## (undated) DEVICE — GLOVE PROTEXIS POWDER FREE 8.0 ORTHOPEDIC 2D73ET80

## (undated) DEVICE — SU SILK 0 SH 30" K834H

## (undated) DEVICE — DRSG DRAIN 2X2" 7087

## (undated) DEVICE — SUCTION MANIFOLD DORNOCH ULTRA CART UL-CL500

## (undated) DEVICE — LINEN TOWEL PACK X6 WHITE 5487

## (undated) DEVICE — LINEN GOWN XLG 5407

## (undated) DEVICE — DRAPE IOBAN INCISE 23X17" 6650EZ

## (undated) DEVICE — STPL ENDO RELOAD 45MM MEDIUM THICK PURPLE EGIA45AMT

## (undated) DEVICE — SU SILK 0 TIE 6X30" A306H

## (undated) DEVICE — SPONGE TONSIL W/STRING MED 23275-680

## (undated) RX ORDER — ALBUTEROL SULFATE 0.83 MG/ML
SOLUTION RESPIRATORY (INHALATION)
Status: DISPENSED
Start: 2019-08-30

## (undated) RX ORDER — FENTANYL CITRATE 50 UG/ML
INJECTION, SOLUTION INTRAMUSCULAR; INTRAVENOUS
Status: DISPENSED
Start: 2019-08-30

## (undated) RX ORDER — ONDANSETRON 2 MG/ML
INJECTION INTRAMUSCULAR; INTRAVENOUS
Status: DISPENSED
Start: 2019-08-30

## (undated) RX ORDER — CEFAZOLIN SODIUM 1 G/3ML
INJECTION, POWDER, FOR SOLUTION INTRAMUSCULAR; INTRAVENOUS
Status: DISPENSED
Start: 2019-08-30

## (undated) RX ORDER — KETOROLAC TROMETHAMINE 30 MG/ML
INJECTION, SOLUTION INTRAMUSCULAR; INTRAVENOUS
Status: DISPENSED
Start: 2019-08-30

## (undated) RX ORDER — BUPIVACAINE HYDROCHLORIDE 2.5 MG/ML
INJECTION, SOLUTION EPIDURAL; INFILTRATION; INTRACAUDAL
Status: DISPENSED
Start: 2019-08-30

## (undated) RX ORDER — LIDOCAINE HYDROCHLORIDE 20 MG/ML
INJECTION, SOLUTION EPIDURAL; INFILTRATION; INTRACAUDAL; PERINEURAL
Status: DISPENSED
Start: 2019-08-30

## (undated) RX ORDER — PROPOFOL 10 MG/ML
INJECTION, EMULSION INTRAVENOUS
Status: DISPENSED
Start: 2019-08-30

## (undated) RX ORDER — PHENYLEPHRINE HCL IN 0.9% NACL 1 MG/10 ML
SYRINGE (ML) INTRAVENOUS
Status: DISPENSED
Start: 2019-08-30

## (undated) RX ORDER — HYDROMORPHONE HYDROCHLORIDE 1 MG/ML
INJECTION, SOLUTION INTRAMUSCULAR; INTRAVENOUS; SUBCUTANEOUS
Status: DISPENSED
Start: 2019-08-30

## (undated) RX ORDER — GLYCOPYRROLATE 0.2 MG/ML
INJECTION, SOLUTION INTRAMUSCULAR; INTRAVENOUS
Status: DISPENSED
Start: 2019-08-30